# Patient Record
Sex: FEMALE | NOT HISPANIC OR LATINO
[De-identification: names, ages, dates, MRNs, and addresses within clinical notes are randomized per-mention and may not be internally consistent; named-entity substitution may affect disease eponyms.]

---

## 2022-08-08 PROBLEM — Z00.00 ENCOUNTER FOR PREVENTIVE HEALTH EXAMINATION: Status: ACTIVE | Noted: 2022-08-08

## 2022-08-09 ENCOUNTER — NON-APPOINTMENT (OUTPATIENT)
Age: 52
End: 2022-08-09

## 2022-08-10 ENCOUNTER — APPOINTMENT (OUTPATIENT)
Dept: HEMATOLOGY ONCOLOGY | Facility: CLINIC | Age: 52
End: 2022-08-10

## 2022-08-10 ENCOUNTER — APPOINTMENT (OUTPATIENT)
Dept: GYNECOLOGIC ONCOLOGY | Facility: CLINIC | Age: 52
End: 2022-08-10

## 2022-08-10 VITALS
HEART RATE: 91 BPM | SYSTOLIC BLOOD PRESSURE: 136 MMHG | BODY MASS INDEX: 27.88 KG/M2 | DIASTOLIC BLOOD PRESSURE: 82 MMHG | HEIGHT: 62 IN | WEIGHT: 151.5 LBS

## 2022-08-10 DIAGNOSIS — Z80.0 FAMILY HISTORY OF MALIGNANT NEOPLASM OF DIGESTIVE ORGANS: ICD-10-CM

## 2022-08-10 DIAGNOSIS — N88.8 OTHER SPECIFIED NONINFLAMMATORY DISORDERS OF CERVIX UTERI: ICD-10-CM

## 2022-08-10 DIAGNOSIS — Z85.41 PERSONAL HISTORY OF MALIGNANT NEOPLASM OF CERVIX UTERI: ICD-10-CM

## 2022-08-10 DIAGNOSIS — B97.7 PAPILLOMAVIRUS AS THE CAUSE OF DISEASES CLASSIFIED ELSEWHERE: ICD-10-CM

## 2022-08-10 DIAGNOSIS — R59.9 ENLARGED LYMPH NODES, UNSPECIFIED: ICD-10-CM

## 2022-08-10 DIAGNOSIS — R93.89 ABNORMAL FINDINGS ON DIAGNOSTIC IMAGING OF OTHER SPECIFIED BODY STRUCTURES: ICD-10-CM

## 2022-08-10 DIAGNOSIS — R87.613 HIGH GRADE SQUAMOUS INTRAEPITHELIAL LESION ON CYTOLOGIC SMEAR OF CERVIX (HGSIL): ICD-10-CM

## 2022-08-10 PROCEDURE — 99205 OFFICE O/P NEW HI 60 MIN: CPT | Mod: 25

## 2022-08-10 PROCEDURE — 57500 BIOPSY OF CERVIX: CPT

## 2022-08-15 ENCOUNTER — NON-APPOINTMENT (OUTPATIENT)
Age: 52
End: 2022-08-15

## 2022-08-17 ENCOUNTER — NON-APPOINTMENT (OUTPATIENT)
Age: 52
End: 2022-08-17

## 2022-08-18 ENCOUNTER — APPOINTMENT (OUTPATIENT)
Dept: NUCLEAR MEDICINE | Facility: CLINIC | Age: 52
End: 2022-08-18

## 2022-08-18 ENCOUNTER — OUTPATIENT (OUTPATIENT)
Dept: OUTPATIENT SERVICES | Facility: HOSPITAL | Age: 52
LOS: 1 days | End: 2022-08-18
Payer: MEDICAID

## 2022-08-18 DIAGNOSIS — R59.1 GENERALIZED ENLARGED LYMPH NODES: ICD-10-CM

## 2022-08-18 DIAGNOSIS — C53.9 MALIGNANT NEOPLASM OF CERVIX UTERI, UNSPECIFIED: ICD-10-CM

## 2022-08-18 PROCEDURE — 78815 PET IMAGE W/CT SKULL-THIGH: CPT | Mod: 26,PI

## 2022-08-18 PROCEDURE — 78815 PET IMAGE W/CT SKULL-THIGH: CPT

## 2022-08-18 PROCEDURE — A9552: CPT

## 2022-08-19 ENCOUNTER — RESULT REVIEW (OUTPATIENT)
Age: 52
End: 2022-08-19

## 2022-08-19 ENCOUNTER — APPOINTMENT (OUTPATIENT)
Dept: ULTRASOUND IMAGING | Facility: CLINIC | Age: 52
End: 2022-08-19

## 2022-08-19 ENCOUNTER — APPOINTMENT (OUTPATIENT)
Dept: MAMMOGRAPHY | Facility: CLINIC | Age: 52
End: 2022-08-19

## 2022-08-19 ENCOUNTER — OUTPATIENT (OUTPATIENT)
Dept: OUTPATIENT SERVICES | Facility: HOSPITAL | Age: 52
LOS: 1 days | End: 2022-08-19

## 2022-08-19 PROCEDURE — 76641 ULTRASOUND BREAST COMPLETE: CPT | Mod: 26,50

## 2022-08-19 PROCEDURE — 77065 DX MAMMO INCL CAD UNI: CPT | Mod: 26,RT

## 2022-08-19 PROCEDURE — 77061 BREAST TOMOSYNTHESIS UNI: CPT | Mod: 26

## 2022-08-22 ENCOUNTER — OUTPATIENT (OUTPATIENT)
Dept: OUTPATIENT SERVICES | Facility: HOSPITAL | Age: 52
LOS: 1 days | Discharge: HOME | End: 2022-08-22

## 2022-08-22 DIAGNOSIS — C53.1 MALIGNANT NEOPLASM OF EXOCERVIX: ICD-10-CM

## 2022-08-23 ENCOUNTER — APPOINTMENT (OUTPATIENT)
Dept: ULTRASOUND IMAGING | Facility: CLINIC | Age: 52
End: 2022-08-23

## 2022-08-23 ENCOUNTER — OUTPATIENT (OUTPATIENT)
Dept: OUTPATIENT SERVICES | Facility: HOSPITAL | Age: 52
LOS: 1 days | End: 2022-08-23

## 2022-08-23 ENCOUNTER — RESULT REVIEW (OUTPATIENT)
Age: 52
End: 2022-08-23

## 2022-08-23 LAB — CORE LAB BIOPSY: NORMAL

## 2022-08-23 PROCEDURE — 88360 TUMOR IMMUNOHISTOCHEM/MANUAL: CPT | Mod: 26

## 2022-08-23 PROCEDURE — 19083 BX BREAST 1ST LESION US IMAG: CPT | Mod: RT

## 2022-08-23 PROCEDURE — 77065 DX MAMMO INCL CAD UNI: CPT | Mod: 26,RT

## 2022-08-23 PROCEDURE — 88305 TISSUE EXAM BY PATHOLOGIST: CPT | Mod: 26

## 2022-08-24 ENCOUNTER — APPOINTMENT (OUTPATIENT)
Dept: HEMATOLOGY ONCOLOGY | Facility: CLINIC | Age: 52
End: 2022-08-24

## 2022-08-24 ENCOUNTER — NON-APPOINTMENT (OUTPATIENT)
Age: 52
End: 2022-08-24

## 2022-08-24 VITALS
RESPIRATION RATE: 16 BRPM | SYSTOLIC BLOOD PRESSURE: 137 MMHG | BODY MASS INDEX: 27.99 KG/M2 | HEIGHT: 61.97 IN | WEIGHT: 152.12 LBS | DIASTOLIC BLOOD PRESSURE: 83 MMHG | TEMPERATURE: 97.4 F | HEART RATE: 72 BPM | OXYGEN SATURATION: 98 %

## 2022-08-24 LAB — SURGICAL PATHOLOGY STUDY: SIGNIFICANT CHANGE UP

## 2022-08-24 PROCEDURE — 99205 OFFICE O/P NEW HI 60 MIN: CPT

## 2022-08-25 ENCOUNTER — RESULT REVIEW (OUTPATIENT)
Age: 52
End: 2022-08-25

## 2022-08-25 ENCOUNTER — APPOINTMENT (OUTPATIENT)
Dept: SURGICAL ONCOLOGY | Facility: CLINIC | Age: 52
End: 2022-08-25

## 2022-08-25 VITALS
RESPIRATION RATE: 16 BRPM | HEIGHT: 62 IN | BODY MASS INDEX: 27.6 KG/M2 | DIASTOLIC BLOOD PRESSURE: 84 MMHG | WEIGHT: 150 LBS | TEMPERATURE: 98.6 F | HEART RATE: 76 BPM | SYSTOLIC BLOOD PRESSURE: 120 MMHG | OXYGEN SATURATION: 98 %

## 2022-08-25 PROCEDURE — 99205 OFFICE O/P NEW HI 60 MIN: CPT

## 2022-08-25 NOTE — OB HISTORY
[Menstruating] : The patient is menstruating [Menarche Age ____] : menarche age [unfilled] [Definite ___ (Date)] : the last menstrual period was [unfilled] [Normal Duration] : the duration was normal [Regular Cycle Intervals] : have been regular [Total Preg ___] : G[unfilled] [Live Births ___] : P[unfilled]

## 2022-08-25 NOTE — PHYSICAL EXAM
[Normal] : supple, no neck mass and thyroid not enlarged [Normal Neck Lymph Nodes] : normal neck lymph nodes  [Normal] : oriented to person, place and time, with appropriate affect [de-identified] : ecchymosis from biopsy of right breast but no masses; 2cm mobile right axillary lymph node [de-identified] : left supra-clavicular node; right axillary node

## 2022-08-25 NOTE — HISTORY OF PRESENT ILLNESS
[de-identified] : Ms. MARISOL OROSCO is a 52 year old woman, primarily [place] speaking, who presents today for an initial consultation for breast cancer.\par Accompanied by her daughter, Tammy, who the patient prefers to translate for her. \par \par B/L mammo 7/26/22 ([facility]) with benign findings to left breast (BIRADS 2) and right breast shows a suspicious but probably benign lesion (BIRADS 3).\par \par She returned to [place] from the [facility] to establish care after having a biopsy in [facility] that was c/w cervical cancer. She established care with Dr. Radha Holm who repeated a cervical biopsy (path shows high grade squamous intraepithelial lesion to cervix, fragments of SCC w/ extensive tumor necrosis to endocervix) Dr. Holm also ordered PET/CT & further breast imaging \par \par PET/CT 8/18/22 shows large area of intense activity w/in the uterine cavity corresponding to known uterine malignancy, nonspecific focal FDG activity in vaginal region, concerning for disease involvement. FDG avid retroperitoneal & B/L pelvic lymphadenopathy, c/w metastases from uterus. FDG avid right breast nodule likely malignancy. FDG avid right retropectoral, right axillary and right internal mammary nodes, suspicious for metastasis from breast malignancy. Mildly FDG avid prevascular and left retropectoral lymph nodes are indeterminate. Nonspecific approx symmetric FDG avidity in b/l palatine tonsils, probably inflammatory. Nonspecific generalized bone marrow hypermetabolism w/o CT correlate, probably reactive and may be related to anemia.\par \par right breast diagnostic mammo/ bilateral sono to follow-up on [facility] abnormal imaging, done on 8/19/2022. \par US showed suspicious mass to right breast 10:00 4 cm fn, US biopsy recommended, highly suspicious for malignancy, BI-RADS 5.\par \par 8/23/22, right breast US biopsy, 10:00 4 cm fn, path: moderately to poorly differentiated invasive ductal carcinoma with mucinous and micropapillary features, 1.4 cm, intermediate grade DCIS, cribriform pattern, extensive lymphovascular invasion present, microcalcifications present in malignant and benign epithelium. ER/HI/HER2 receptors pending \par \par \par Family history of father with stomach cancer. Today she denies palpable breast masses, nipple discharge, skin changes, inversion or breast pain. \par

## 2022-08-25 NOTE — CONSULT LETTER
[Dear  ___] : Dear  [unfilled], [Consult Letter:] : I had the pleasure of evaluating your patient, [unfilled]. [Please see my note below.] : Please see my note below. [Consult Closing:] : Thank you very much for allowing me to participate in the care of this patient.  If you have any questions, please do not hesitate to contact me. [Sincerely,] : Sincerely, [FreeTextEntry1] : We can speak when the pathology returns from the lymph node biopsies. [FreeTextEntry3] : Panda Acosta MD FACS\par Chief of Surgical Oncology\par \par  [DrYesenia  ___] : Dr. PINEDO

## 2022-08-25 NOTE — ASSESSMENT
[FreeTextEntry1] : IMP:\par 51yo F w/ newly diagnosed uterine & breast cancer. \par \par B/L mammo 7/26/22 ([place]) with benign findings to left breast (BIRADS 2) and right breast shows a suspicious but probably benign lesion (BIRADS 3).\par \par 8/10/22: cervical biopsy (path shows high grade squamous intraepithelial lesion to cervix, fragments of SCC w/ extensive tumor necrosis to endocervix)\par \par PET/CT 8/18/22 shows large area of intense activity w/in the uterine cavity corresponding to known uterine malignancy, nonspecific focal FDG activity in vaginal region, concerning for disease involvement. FDG avid retroperitoneal & B/L pelvic lymphadenopathy, c/w metastases from uterus. FDG avid right breast nodule likely malignancy. FDG avid right retropectoral, right axillary and right internal mammary nodes, suspicious for metastasis from breast malignancy. Mildly FDG avid prevascular and left retropectoral lymph nodes are indeterminate. Nonspecific approx symmetric FDG avidity in b/l palatine tonsils, probably inflammatory. Nonspecific generalized bone marrow hypermetabolism w/o CT correlate, probably reactive and may be related to anemia.\par \par right breast diagnostic mammo/ bilateral sono 8/19/2022. 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 ER/RI/HER2 receptors pending \par \par MRI ordered & currently pending appt. She is seeing Dr. Charles for the cervical ca and will see Dr. Perrin next week for the breast Ca.\par \par PLAN:\par Right axilla US biopsy  and left supra-clavicular node biopsy tomorrow\par \par When that pathology returns,we will need to discus the priorities for treatment as a team

## 2022-08-26 ENCOUNTER — RESULT REVIEW (OUTPATIENT)
Age: 52
End: 2022-08-26

## 2022-08-26 ENCOUNTER — OUTPATIENT (OUTPATIENT)
Dept: OUTPATIENT SERVICES | Facility: HOSPITAL | Age: 52
LOS: 1 days | End: 2022-08-26
Payer: COMMERCIAL

## 2022-08-26 ENCOUNTER — NON-APPOINTMENT (OUTPATIENT)
Age: 52
End: 2022-08-26

## 2022-08-26 ENCOUNTER — APPOINTMENT (OUTPATIENT)
Dept: ULTRASOUND IMAGING | Facility: IMAGING CENTER | Age: 52
End: 2022-08-26

## 2022-08-26 DIAGNOSIS — C50.911 MALIGNANT NEOPLASM OF UNSPECIFIED SITE OF RIGHT FEMALE BREAST: ICD-10-CM

## 2022-08-26 PROCEDURE — 88172 CYTP DX EVAL FNA 1ST EA SITE: CPT

## 2022-08-26 PROCEDURE — 88341 IMHCHEM/IMCYTCHM EA ADD ANTB: CPT

## 2022-08-26 PROCEDURE — 88173 CYTOPATH EVAL FNA REPORT: CPT | Mod: 26

## 2022-08-26 PROCEDURE — 88305 TISSUE EXAM BY PATHOLOGIST: CPT

## 2022-08-26 PROCEDURE — 10005 FNA BX W/US GDN 1ST LES: CPT | Mod: RT,59

## 2022-08-26 PROCEDURE — 88341 IMHCHEM/IMCYTCHM EA ADD ANTB: CPT | Mod: 26,59

## 2022-08-26 PROCEDURE — 10006 FNA BX W/US GDN EA ADDL: CPT | Mod: LT

## 2022-08-26 PROCEDURE — 76942 ECHO GUIDE FOR BIOPSY: CPT | Mod: 26

## 2022-08-26 PROCEDURE — 76942 ECHO GUIDE FOR BIOPSY: CPT

## 2022-08-26 PROCEDURE — 10006 FNA BX W/US GDN EA ADDL: CPT

## 2022-08-26 PROCEDURE — 88305 TISSUE EXAM BY PATHOLOGIST: CPT | Mod: 26

## 2022-08-26 PROCEDURE — 88360 TUMOR IMMUNOHISTOCHEM/MANUAL: CPT | Mod: 26

## 2022-08-26 PROCEDURE — 88377 M/PHMTRC ALYS ISHQUANT/SEMIQ: CPT

## 2022-08-26 PROCEDURE — 88342 IMHCHEM/IMCYTCHM 1ST ANTB: CPT | Mod: 26,59

## 2022-08-26 PROCEDURE — 38505 NEEDLE BIOPSY LYMPH NODES: CPT

## 2022-08-26 PROCEDURE — 88360 TUMOR IMMUNOHISTOCHEM/MANUAL: CPT

## 2022-08-26 PROCEDURE — 88377 M/PHMTRC ALYS ISHQUANT/SEMIQ: CPT | Mod: 26

## 2022-08-26 PROCEDURE — 38505 NEEDLE BIOPSY LYMPH NODES: CPT | Mod: 50

## 2022-08-26 PROCEDURE — 88173 CYTOPATH EVAL FNA REPORT: CPT

## 2022-08-26 NOTE — CONSULT LETTER
[Dear  ___] : Dear  [unfilled], [Consult Letter:] : I had the pleasure of evaluating your patient, [unfilled]. [DrYesenia  ___] : Dr. PINEDO

## 2022-08-26 NOTE — REASON FOR VISIT
[Initial Consultation] : an initial consultation [Other: _____] : [unfilled] [FreeTextEntry2] : cervical cancer, breast cancer.

## 2022-08-26 NOTE — HISTORY OF PRESENT ILLNESS
[Disease: _____________________] : Disease: [unfilled] [de-identified] : 52 y.o female with newly diagnosed with cervical cancer in [facility].\par \par \par \par Ms. Sharma, 52 years old, accompanied by her daughter, Tammy and is referred by Dr. Charles, for HSIL (7/20/22 from [facility]) cervical mass, thickened endometrium noted on CT (from [facility]). 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 \par \par Pt is without symptoms; she has not seen a GYN in over 10 years and recalls never having an abnormal pap smear. She returned home to the [facility] to help her mother with medical problems and decided to have a GYN checkup at that time. Pap smear returned HSIL / +HPV. Pathology noted SCC moderately differentiated and CT scan identified cervical mass; thickened lining of the uterus; lymphadenopathy as well as a right breast mass and right axillary lymphadenopathy. \par \par She denies f/c/n/v/d/abnormal vaginal bleeding, changes to bowel or bladder habits. Denies unintentional weight loss or gain. Denies post-coital or intercycle vaginal bleeding; denies abdominal pain, distention, bloating, back pain or any other associated symptoms. \par \par Imaging:\par 7/22/20 ([facility], [facility])\par Breast: 1.6 x 1.7 cm tuberous, polycyclic contour, accumulating contrast medium, visualized at the borderline of the lower quadrants of the right breast, with no infiltration of the thoracic muscles. \par Right axillary region: 1.4 x 1.0 cm; 1.0 x 1.0 cm and 0.8 x 1.1 cm. lymphadenopathy. \par Liver: well defined even borders, the liver parenchyma is with signs of fat rearrangement, a hypdense inclusion 1.6 x 2.1 cm in size, is subcapsularly visualized Segment 8 of the liver. It lacunarly accumulates contrast medium, additionally accumulates contrast medium during the delayed phase. The intrahepatic and extrahepatic bile ducts are not dilated. \par Abdominal lymph nodes: paraaortic and interaortocaval lymph nodes are sized 10-12 mm, weakly accumulate contrast medium. \par No free fluid in the abdominal cavity. \par Uterus: 5.9 x 7.5 cm. \par EM: 15 mm with dense fluid visualized, mildly heterogeneous. \par Cervix: tissue lesion 56 x 42 mm invading the uterine isthmus and body. The parametria are infiltrated. \par Uterine appendages are normally positioned, a cystic inclusion of 3.3 x 3.5 cm is visualized in the projection of the left ovary. \par Right Ovary: 2.0 x 2.8 cm. \par Lymph Nodes:\par - right common iliac lymph node: 1.1 x 1.5 cm.\par - right pelvic wall node: 1.8 x 3.1 cm\par - left pelvic wall node: 2.4 x 3.0 cm. \par - right external iliac lymph node: 1.8 x 1.6 cm \par \par She was seen by Dr. 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 \par Menarche: 12\par Menopause: still menstruating. LMP 7/30/22 [de-identified] : Patient has no symptoms.\par

## 2022-08-31 ENCOUNTER — APPOINTMENT (OUTPATIENT)
Dept: SURGICAL ONCOLOGY | Facility: CLINIC | Age: 52
End: 2022-08-31

## 2022-08-31 PROBLEM — Z87.898 HISTORY OF LUMP OF RIGHT BREAST: Status: RESOLVED | Noted: 2022-08-22 | Resolved: 2022-08-31

## 2022-08-31 PROBLEM — Z85.3 HISTORY OF INFILTRATING DUCTAL CARCINOMA OF BREAST: Status: RESOLVED | Noted: 2022-08-25 | Resolved: 2022-08-31

## 2022-08-31 PROBLEM — Z76.89 ESTABLISHING CARE WITH NEW DOCTOR, ENCOUNTER FOR: Status: RESOLVED | Noted: 2022-08-10 | Resolved: 2022-08-31

## 2022-08-31 LAB — NON-GYNECOLOGICAL CYTOLOGY STUDY: SIGNIFICANT CHANGE UP

## 2022-09-01 ENCOUNTER — RESULT REVIEW (OUTPATIENT)
Age: 52
End: 2022-09-01

## 2022-09-01 ENCOUNTER — APPOINTMENT (OUTPATIENT)
Dept: HEMATOLOGY ONCOLOGY | Facility: CLINIC | Age: 52
End: 2022-09-01

## 2022-09-01 VITALS
SYSTOLIC BLOOD PRESSURE: 132 MMHG | HEIGHT: 61.42 IN | RESPIRATION RATE: 16 BRPM | HEART RATE: 78 BPM | WEIGHT: 152.12 LBS | BODY MASS INDEX: 28.35 KG/M2 | TEMPERATURE: 96.6 F | OXYGEN SATURATION: 94 % | DIASTOLIC BLOOD PRESSURE: 83 MMHG

## 2022-09-01 DIAGNOSIS — Z76.89 PERSONS ENCOUNTERING HEALTH SERVICES IN OTHER SPECIFIED CIRCUMSTANCES: ICD-10-CM

## 2022-09-01 DIAGNOSIS — Z85.3 PERSONAL HISTORY OF MALIGNANT NEOPLASM OF BREAST: ICD-10-CM

## 2022-09-01 DIAGNOSIS — Z87.898 PERSONAL HISTORY OF OTHER SPECIFIED CONDITIONS: ICD-10-CM

## 2022-09-01 LAB
BASOPHILS # BLD AUTO: 0.05 K/UL — SIGNIFICANT CHANGE UP (ref 0–0.2)
BASOPHILS NFR BLD AUTO: 0.7 % — SIGNIFICANT CHANGE UP (ref 0–2)
EOSINOPHIL # BLD AUTO: 0.06 K/UL — SIGNIFICANT CHANGE UP (ref 0–0.5)
EOSINOPHIL NFR BLD AUTO: 0.9 % — SIGNIFICANT CHANGE UP (ref 0–6)
HCT VFR BLD CALC: 36.6 % — SIGNIFICANT CHANGE UP (ref 34.5–45)
HGB BLD-MCNC: 12.1 G/DL — SIGNIFICANT CHANGE UP (ref 11.5–15.5)
IMM GRANULOCYTES NFR BLD AUTO: 0.1 % — SIGNIFICANT CHANGE UP (ref 0–1.5)
INR PPP: 1.03 RATIO
LYMPHOCYTES # BLD AUTO: 1.08 K/UL — SIGNIFICANT CHANGE UP (ref 1–3.3)
LYMPHOCYTES # BLD AUTO: 15.9 % — SIGNIFICANT CHANGE UP (ref 13–44)
MCHC RBC-ENTMCNC: 28.1 PG — SIGNIFICANT CHANGE UP (ref 27–34)
MCHC RBC-ENTMCNC: 33.1 G/DL — SIGNIFICANT CHANGE UP (ref 32–36)
MCV RBC AUTO: 85.1 FL — SIGNIFICANT CHANGE UP (ref 80–100)
MONOCYTES # BLD AUTO: 0.58 K/UL — SIGNIFICANT CHANGE UP (ref 0–0.9)
MONOCYTES NFR BLD AUTO: 8.5 % — SIGNIFICANT CHANGE UP (ref 2–14)
NEUTROPHILS # BLD AUTO: 5.01 K/UL — SIGNIFICANT CHANGE UP (ref 1.8–7.4)
NEUTROPHILS NFR BLD AUTO: 73.9 % — SIGNIFICANT CHANGE UP (ref 43–77)
NRBC # BLD: 0 /100 WBCS — SIGNIFICANT CHANGE UP (ref 0–0)
PLATELET # BLD AUTO: 239 K/UL — SIGNIFICANT CHANGE UP (ref 150–400)
PT BLD: 12.1 SEC
RBC # BLD: 4.3 M/UL — SIGNIFICANT CHANGE UP (ref 3.8–5.2)
RBC # FLD: 12.7 % — SIGNIFICANT CHANGE UP (ref 10.3–14.5)
TRANSFERRIN SERPL-MCNC: 293 MG/DL
WBC # BLD: 6.79 K/UL — SIGNIFICANT CHANGE UP (ref 3.8–10.5)
WBC # FLD AUTO: 6.79 K/UL — SIGNIFICANT CHANGE UP (ref 3.8–10.5)

## 2022-09-01 PROCEDURE — 99215 OFFICE O/P EST HI 40 MIN: CPT

## 2022-09-01 PROCEDURE — 93010 ELECTROCARDIOGRAM REPORT: CPT

## 2022-09-02 LAB
ALBUMIN SERPL ELPH-MCNC: 4.4 G/DL
ALP BLD-CCNC: 61 U/L
ALT SERPL-CCNC: 11 U/L
ANION GAP SERPL CALC-SCNC: 18 MMOL/L
AST SERPL-CCNC: 24 U/L
BILIRUB SERPL-MCNC: 0.2 MG/DL
BUN SERPL-MCNC: 10 MG/DL
CALCIUM SERPL-MCNC: 9.7 MG/DL
CANCER AG125 SERPL-ACNC: 35 U/ML
CANCER AG27-29 SERPL-ACNC: 122.4 U/ML
CEA SERPL-MCNC: 1.6 NG/ML
CHLORIDE SERPL-SCNC: 99 MMOL/L
CO2 SERPL-SCNC: 20 MMOL/L
CREAT SERPL-MCNC: 0.81 MG/DL
EGFR: 87 ML/MIN/1.73M2
ESTIMATED AVERAGE GLUCOSE: 120 MG/DL
FERRITIN SERPL-MCNC: 17 NG/ML
GLUCOSE SERPL-MCNC: 86 MG/DL
HBA1C MFR BLD HPLC: 5.8 %
IRON SATN MFR SERPL: 10 %
IRON SERPL-MCNC: 38 UG/DL
POTASSIUM SERPL-SCNC: 3.9 MMOL/L
PROT SERPL-MCNC: 7.8 G/DL
SODIUM SERPL-SCNC: 136 MMOL/L
TIBC SERPL-MCNC: 386 UG/DL
TSH SERPL-ACNC: 1.55 UIU/ML
UIBC SERPL-MCNC: 348 UG/DL

## 2022-09-08 ENCOUNTER — APPOINTMENT (OUTPATIENT)
Dept: HEMATOLOGY ONCOLOGY | Facility: CLINIC | Age: 52
End: 2022-09-08

## 2022-09-08 PROCEDURE — 99214 OFFICE O/P EST MOD 30 MIN: CPT | Mod: 95

## 2022-09-09 ENCOUNTER — LABORATORY RESULT (OUTPATIENT)
Age: 52
End: 2022-09-09

## 2022-09-09 ENCOUNTER — RESULT REVIEW (OUTPATIENT)
Age: 52
End: 2022-09-09

## 2022-09-09 ENCOUNTER — APPOINTMENT (OUTPATIENT)
Dept: CARDIOLOGY | Facility: CLINIC | Age: 52
End: 2022-09-09

## 2022-09-09 ENCOUNTER — APPOINTMENT (OUTPATIENT)
Dept: HEMATOLOGY ONCOLOGY | Facility: CLINIC | Age: 52
End: 2022-09-09

## 2022-09-09 LAB
BASOPHILS # BLD AUTO: 0.05 K/UL — SIGNIFICANT CHANGE UP (ref 0–0.2)
BASOPHILS NFR BLD AUTO: 1 % — SIGNIFICANT CHANGE UP (ref 0–2)
EOSINOPHIL # BLD AUTO: 0.07 K/UL — SIGNIFICANT CHANGE UP (ref 0–0.5)
EOSINOPHIL NFR BLD AUTO: 1.4 % — SIGNIFICANT CHANGE UP (ref 0–6)
HCT VFR BLD CALC: 38.8 % — SIGNIFICANT CHANGE UP (ref 34.5–45)
HGB BLD-MCNC: 12.8 G/DL — SIGNIFICANT CHANGE UP (ref 11.5–15.5)
IMM GRANULOCYTES NFR BLD AUTO: 0.4 % — SIGNIFICANT CHANGE UP (ref 0–1.5)
LYMPHOCYTES # BLD AUTO: 1.26 K/UL — SIGNIFICANT CHANGE UP (ref 1–3.3)
LYMPHOCYTES # BLD AUTO: 25.1 % — SIGNIFICANT CHANGE UP (ref 13–44)
MCHC RBC-ENTMCNC: 28 PG — SIGNIFICANT CHANGE UP (ref 27–34)
MCHC RBC-ENTMCNC: 33 G/DL — SIGNIFICANT CHANGE UP (ref 32–36)
MCV RBC AUTO: 84.9 FL — SIGNIFICANT CHANGE UP (ref 80–100)
MONOCYTES # BLD AUTO: 0.53 K/UL — SIGNIFICANT CHANGE UP (ref 0–0.9)
MONOCYTES NFR BLD AUTO: 10.6 % — SIGNIFICANT CHANGE UP (ref 2–14)
NEUTROPHILS # BLD AUTO: 3.09 K/UL — SIGNIFICANT CHANGE UP (ref 1.8–7.4)
NEUTROPHILS NFR BLD AUTO: 61.5 % — SIGNIFICANT CHANGE UP (ref 43–77)
NRBC # BLD: 0 /100 WBCS — SIGNIFICANT CHANGE UP (ref 0–0)
PLATELET # BLD AUTO: 272 K/UL — SIGNIFICANT CHANGE UP (ref 150–400)
RBC # BLD: 4.57 M/UL — SIGNIFICANT CHANGE UP (ref 3.8–5.2)
RBC # FLD: 12.5 % — SIGNIFICANT CHANGE UP (ref 10.3–14.5)
SARS-COV-2 N GENE NPH QL NAA+PROBE: NOT DETECTED
WBC # BLD: 5.02 K/UL — SIGNIFICANT CHANGE UP (ref 3.8–10.5)
WBC # FLD AUTO: 5.02 K/UL — SIGNIFICANT CHANGE UP (ref 3.8–10.5)

## 2022-09-09 PROCEDURE — 93306 TTE W/DOPPLER COMPLETE: CPT

## 2022-09-09 PROCEDURE — 93356 MYOCRD STRAIN IMG SPCKL TRCK: CPT

## 2022-09-09 PROCEDURE — 93010 ELECTROCARDIOGRAM REPORT: CPT

## 2022-09-10 LAB
ALBUMIN SERPL ELPH-MCNC: 4.7 G/DL
ALP BLD-CCNC: 65 U/L
ALT SERPL-CCNC: 13 U/L
ANION GAP SERPL CALC-SCNC: 12 MMOL/L
APPEARANCE: CLEAR
APTT BLD: 30.5 SEC
AST SERPL-CCNC: 23 U/L
BILIRUB SERPL-MCNC: 0.3 MG/DL
BILIRUBIN URINE: NEGATIVE
BLOOD URINE: ABNORMAL
BUN SERPL-MCNC: 10 MG/DL
CALCIUM SERPL-MCNC: 9.9 MG/DL
CHLORIDE SERPL-SCNC: 101 MMOL/L
CO2 SERPL-SCNC: 23 MMOL/L
COLOR: COLORLESS
CREAT SERPL-MCNC: 0.82 MG/DL
EGFR: 86 ML/MIN/1.73M2
GLUCOSE QUALITATIVE U: NEGATIVE
GLUCOSE SERPL-MCNC: 94 MG/DL
HAV IGM SER QL: NONREACTIVE
HBV CORE IGM SER QL: NONREACTIVE
HBV SURFACE AG SER QL: NONREACTIVE
HCV AB SER QL: NONREACTIVE
HCV S/CO RATIO: 0.51 S/CO
INR PPP: 0.99 RATIO
KETONES URINE: NEGATIVE
LEUKOCYTE ESTERASE URINE: ABNORMAL
MAGNESIUM SERPL-MCNC: 2.2 MG/DL
NITRITE URINE: NEGATIVE
PH URINE: 7
POTASSIUM SERPL-SCNC: 4.9 MMOL/L
PROT SERPL-MCNC: 8.3 G/DL
PROTEIN URINE: NEGATIVE
PT BLD: 11.7 SEC
SODIUM SERPL-SCNC: 136 MMOL/L
SPECIFIC GRAVITY URINE: 1.01
TSH SERPL-ACNC: 1.85 UIU/ML
UROBILINOGEN URINE: NORMAL

## 2022-09-10 NOTE — HISTORY OF PRESENT ILLNESS
[Home] : at home, [unfilled] , at the time of the visit. [Medical Office: ([facility])___] : at the medical office located in  [Other:____] : [unfilled] [Verbal consent obtained from patient] : the patient, [unfilled] [Disease: _____________________] : Disease: [unfilled] [de-identified] : 52 y.o female with newly diagnosed with cervical cancer in [facility].\par \par \par \par Ms. Sharma, 52 years old, accompanied by her daughter, Tammy and is referred by Dr. Charles, for HSIL (7/20/22 from [facility]) cervical mass, thickened endometrium noted on CT (from [facility]). 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 \par \par Pt is without symptoms; she has not seen a GYN in over 10 years and recalls never having an abnormal pap smear. She returned home to the [facility] to help her mother with medical problems and decided to have a GYN checkup at that time. Pap smear returned HSIL / +HPV. Pathology noted SCC moderately differentiated and CT scan identified cervical mass; thickened lining of the uterus; lymphadenopathy as well as a right breast mass and right axillary lymphadenopathy. \par \par She denies f/c/n/v/d/abnormal vaginal bleeding, changes to bowel or bladder habits. Denies unintentional weight loss or gain. Denies post-coital or intercycle vaginal bleeding; denies abdominal pain, distention, bloating, back pain or any other associated symptoms. \par \par Imaging:\par 7/22/20 ([facility], [facility])\par Breast: 1.6 x 1.7 cm tuberous, polycyclic contour, accumulating contrast medium, visualized at the borderline of the lower quadrants of the right breast, with no infiltration of the thoracic muscles. \par Right axillary region: 1.4 x 1.0 cm; 1.0 x 1.0 cm and 0.8 x 1.1 cm. lymphadenopathy. \par Liver: well defined even borders, the liver parenchyma is with signs of fat rearrangement, a hypdense inclusion 1.6 x 2.1 cm in size, is subcapsularly visualized Segment 8 of the liver. It lacunarly accumulates contrast medium, additionally accumulates contrast medium during the delayed phase. The intrahepatic and extrahepatic bile ducts are not dilated. \par Abdominal lymph nodes: paraaortic and interaortocaval lymph nodes are sized 10-12 mm, weakly accumulate contrast medium. \par No free fluid in the abdominal cavity. \par Uterus: 5.9 x 7.5 cm. \par EM: 15 mm with dense fluid visualized, mildly heterogeneous. \par Cervix: tissue lesion 56 x 42 mm invading the uterine isthmus and body. The parametria are infiltrated. \par Uterine appendages are normally positioned, a cystic inclusion of 3.3 x 3.5 cm is visualized in the projection of the left ovary. \par Right Ovary: 2.0 x 2.8 cm. \par Lymph Nodes:\par - right common iliac lymph node: 1.1 x 1.5 cm.\par - right pelvic wall node: 1.8 x 3.1 cm\par - left pelvic wall node: 2.4 x 3.0 cm. \par - right external iliac lymph node: 1.8 x 1.6 cm \par \par She was seen by Dr. 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 \par Menarche: 12\par Menopause: still menstruating. LMP 7/30/22 [de-identified] : Patient here for a scheduled TEB visit. PET scan showed metastatic disease.\par Breast biopsy consistent with infiltrating ductal carcinoma , ER/CT positive, Fms4tlm negative.\par Left supraclavicular LN consistent with squamous cell of the cervix.

## 2022-09-10 NOTE — REASON FOR VISIT
[Follow-Up Visit] : a follow-up [Other: _____] : [unfilled] [FreeTextEntry2] : metastatic cervical cancer, stage III breast cancer.

## 2022-09-11 LAB — SURGICAL PATHOLOGY STUDY: SIGNIFICANT CHANGE UP

## 2022-09-12 ENCOUNTER — TRANSCRIPTION ENCOUNTER (OUTPATIENT)
Age: 52
End: 2022-09-12

## 2022-09-12 ENCOUNTER — RESULT REVIEW (OUTPATIENT)
Age: 52
End: 2022-09-12

## 2022-09-12 ENCOUNTER — OUTPATIENT (OUTPATIENT)
Dept: OUTPATIENT SERVICES | Facility: HOSPITAL | Age: 52
LOS: 1 days | End: 2022-09-12
Payer: MEDICAID

## 2022-09-12 VITALS
OXYGEN SATURATION: 100 % | HEIGHT: 62 IN | WEIGHT: 154.32 LBS | HEART RATE: 78 BPM | DIASTOLIC BLOOD PRESSURE: 71 MMHG | SYSTOLIC BLOOD PRESSURE: 134 MMHG | RESPIRATION RATE: 16 BRPM | TEMPERATURE: 98 F

## 2022-09-12 VITALS
DIASTOLIC BLOOD PRESSURE: 68 MMHG | RESPIRATION RATE: 16 BRPM | OXYGEN SATURATION: 100 % | HEART RATE: 71 BPM | SYSTOLIC BLOOD PRESSURE: 128 MMHG

## 2022-09-12 DIAGNOSIS — C50.911 MALIGNANT NEOPLASM OF UNSPECIFIED SITE OF RIGHT FEMALE BREAST: ICD-10-CM

## 2022-09-12 PROCEDURE — C1887: CPT

## 2022-09-12 PROCEDURE — C1769: CPT

## 2022-09-12 PROCEDURE — 36561 INSERT TUNNELED CV CATH: CPT

## 2022-09-12 PROCEDURE — 76937 US GUIDE VASCULAR ACCESS: CPT | Mod: 26

## 2022-09-12 PROCEDURE — 77001 FLUOROGUIDE FOR VEIN DEVICE: CPT

## 2022-09-12 PROCEDURE — 77001 FLUOROGUIDE FOR VEIN DEVICE: CPT | Mod: 26

## 2022-09-12 PROCEDURE — 76937 US GUIDE VASCULAR ACCESS: CPT

## 2022-09-12 PROCEDURE — C1894: CPT

## 2022-09-12 NOTE — REASON FOR VISIT
[Initial Consultation] : an initial consultation [Other: _____] : [unfilled] [FreeTextEntry2] : Cervical cancer and Breast cancer.

## 2022-09-12 NOTE — PRE PROCEDURE NOTE - PRE PROCEDURE EVALUATION
Interventional Radiology    HPI: 52y Female with cervical & breast cancer requiring venous access for chemotherapy presents to IR for chest port placement- due to start chemo on Wednesday, 9/14.    Allergies: NDKA    Data:    T(C): --  HR: --  BP: --  RR: --  SpO2: --    Exam  General: No acute distress  Chest: Non labored breathing  Abdomen: Non-distended  Extremities: No swelling, warm    -WBC 5.02 / HgB 12.8 / Hct 38.8 / Plt 272      Plan: 52y Female presents for chest port placement  -Risks/Benefits/alternatives explained with the patient and/or healthcare proxy and witnessed informed consent obtained.

## 2022-09-12 NOTE — HISTORY OF PRESENT ILLNESS
[Disease: _____________________] : Disease: [unfilled] [T: ___] : T[unfilled] [M: ___] : M[unfilled] [N: ___] : N[unfilled] [AJCC Stage: ____] : AJCC Stage: [unfilled] [de-identified] : Cervical cancer diagnosed at age 52 in 2022 while in [facility]\par Right breast cancer diagnosed simultaneously at age 52\par 7/2022 Pap smear revealed HSIL and +HPV.  Pathology noted SCCA, moderately differentiated\par 7/22/22 CT scan in [facility] identified cervical mass, 5.6 x 4.2 cm, invading the uterine isthmus and body. The parametria are infiltrated. Thickened uterine lining. Lymphadenopathy with right common iliac lymph node: 1.1 x 1.5 cm, right pelvic wall node: 1.8 x 3.1 cm, left pelvic wall node: 2.4 x 3.0 cm, right external iliac lymph node: 1.8 x 1.6 cm \par CT scan also reported a 1.6 x 1.7 cm tuberous mass at the borderline of the lower quadrant of the right breast, with no infiltration of the thoracic muscles.  Right axillary lymphadenopathy measuring 1.4 x 1.0 cm, 1.0 x 1.0 cm and 0.8 x 1.1 cm. \par 8/10/22 Cervical biopsy revealed high grade squamous intraepithelial lesion (HGSIL) (CIN3) with PD-L1 Tumor Proportion Score (TPS) 30%, positive\par 8/18/22 PET/CT scan showed a large area of intense activity within the uterine cavity extending into the uterine cervix with SUV 19.5. FDG avid retroperitoneal and bilateral pelvic lymphadenopathy (SUV 7.4 - 21.1). FDG avid right breast nodule (SUV 13.9) and right retropectoral (SUV 6.1), axillary (SUV 18.2) and internal mammary lymph nodes (SUV 5.8) suspicious for metastasis from breast. Mildly FDG avid prevascular and left retropectoral LN are indeterminate. Nonspecific FDG avidity in bilateral palatine tonsils, probably inflammatory. Nonspecific generalized BM hypermetabolism without CT correlate, probably reactive and may be related to anemia.\par 8/19/22 Mammogram and sonogram revealed heterogeneously dense breast parenchyma and an irregularly marginated mass in the upper outer quadrant  of the right breast with adjacent clusters of indeterminate calcifications. Sonogram revealed a 1.0 x 1.2 cm mass at 10:00. BI-RADS 5\par 8/23/22 Right breast biopsy revealed moderately to poorly differentiated IDC with extensive lymphovascular invasion, ER 95%, VT 95%, HER-2 2+ FISH negative\par 8/26/22 Right axillary LN revealed metastatic adenocarcinoma c/w breast origin. \par 8/26/22 Left supraclavicular LN positive for metastatic squamous cell carcinoma consistent with Stage IV cervical cancer\par  [de-identified] : Right breast IDC with extensive lymphovascular invasion, axillary LN positive, ER 95%, NE 95%, HER-2 2+ FISH negative [de-identified] : Shari comes with her daughter Tammy to discuss the medical management of her newly diagnosed breast and cervical cancers.\par She had not recently had a mammogram or Gyn exam until she was visiting her mother in [facility] and while there followed up with her doctors. She was found to have a large cervical cancer and scan showed synchronous right breast cancer. She has been asymptomatic with no vaginal bleeding and regular periods\par \par HEALTH MAINTENANCE:\par PCP: Dr. Heather Kumari\par Mammogram and breast ultrasound: 8/19/22\par Pap Smear: 7/22\par Colonoscopy: none\par

## 2022-09-12 NOTE — PHYSICAL EXAM
[Fully active, able to carry on all pre-disease performance without restriction] : Status 0 - Fully active, able to carry on all pre-disease performance without restriction [Normal] : affect appropriate [de-identified] : Right breast ecchymosis from biopsy but no palpable mass; right axillary palpable 2 cm LN, mobile

## 2022-09-12 NOTE — ASU DISCHARGE PLAN (ADULT/PEDIATRIC) - NURSING INSTRUCTIONS
Please feel free to contact us at [phone] if any problems arise. After 6PM, Monday through Friday, on weekends and on holidays, please call [phone] and ask for the radiology resident on call to be paged.

## 2022-09-12 NOTE — CONSULT LETTER
[Consult Letter:] : I had the pleasure of evaluating your patient, [unfilled]. [Dear  ___] : Dear  [unfilled], [Please see my note below.] : Please see my note below. [Consult Closing:] : Thank you very much for allowing me to participate in the care of this patient.  If you have any questions, please do not hesitate to contact me. [Sincerely,] : Sincerely, [DrYesenia  ___] : Dr. PINEDO [FreeTextEntry2] : Panda Acosta MD\par [address]\[place] [address]\par  [FreeTextEntry3] : Kaye Perrin MD\par Associate Chief \par [facility]\par [facility]\par  of Medicine\par [employer]\par \par

## 2022-09-12 NOTE — ASU PATIENT PROFILE, ADULT - FALL HARM RISK - UNIVERSAL INTERVENTIONS
Bed in lowest position, wheels locked, appropriate side rails in place/Call bell, personal items and telephone in reach/Instruct patient to call for assistance before getting out of bed or chair/Non-slip footwear when patient is out of bed/[place] to call system/Physically safe environment - no spills, clutter or unnecessary equipment/Purposeful Proactive Rounding/Room/bathroom lighting operational, light cord in reach

## 2022-09-14 ENCOUNTER — RESULT REVIEW (OUTPATIENT)
Age: 52
End: 2022-09-14

## 2022-09-14 ENCOUNTER — NON-APPOINTMENT (OUTPATIENT)
Age: 52
End: 2022-09-14

## 2022-09-14 ENCOUNTER — APPOINTMENT (OUTPATIENT)
Dept: INFUSION THERAPY | Facility: HOSPITAL | Age: 52
End: 2022-09-14

## 2022-09-14 DIAGNOSIS — R11.2 NAUSEA WITH VOMITING, UNSPECIFIED: ICD-10-CM

## 2022-09-14 DIAGNOSIS — Z51.11 ENCOUNTER FOR ANTINEOPLASTIC CHEMOTHERAPY: ICD-10-CM

## 2022-09-14 DIAGNOSIS — E86.0 DEHYDRATION: ICD-10-CM

## 2022-09-14 LAB
APPEARANCE UR: CLEAR — SIGNIFICANT CHANGE UP
BACTERIA # UR AUTO: NEGATIVE — SIGNIFICANT CHANGE UP
BASOPHILS # BLD AUTO: 0 K/UL — SIGNIFICANT CHANGE UP (ref 0–0.2)
BASOPHILS NFR BLD AUTO: 0 % — SIGNIFICANT CHANGE UP (ref 0–2)
BILIRUB UR-MCNC: NEGATIVE — SIGNIFICANT CHANGE UP
COLOR SPEC: COLORLESS — SIGNIFICANT CHANGE UP
DIFF PNL FLD: ABNORMAL
ELLIPTOCYTES BLD QL SMEAR: SLIGHT — SIGNIFICANT CHANGE UP
EOSINOPHIL # BLD AUTO: 0 K/UL — SIGNIFICANT CHANGE UP (ref 0–0.5)
EOSINOPHIL NFR BLD AUTO: 0 % — SIGNIFICANT CHANGE UP (ref 0–6)
EPI CELLS # UR: 3 /HPF — SIGNIFICANT CHANGE UP (ref 0–5)
GLUCOSE UR QL: ABNORMAL
HCT VFR BLD CALC: 38.3 % — SIGNIFICANT CHANGE UP (ref 34.5–45)
HGB BLD-MCNC: 12.8 G/DL — SIGNIFICANT CHANGE UP (ref 11.5–15.5)
HYALINE CASTS # UR AUTO: 2 /LPF — SIGNIFICANT CHANGE UP (ref 0–7)
KETONES UR-MCNC: NEGATIVE — SIGNIFICANT CHANGE UP
LEUKOCYTE ESTERASE UR-ACNC: ABNORMAL
LYMPHOCYTES # BLD AUTO: 0.61 K/UL — LOW (ref 1–3.3)
LYMPHOCYTES # BLD AUTO: 11 % — LOW (ref 13–44)
MCHC RBC-ENTMCNC: 28.1 PG — SIGNIFICANT CHANGE UP (ref 27–34)
MCHC RBC-ENTMCNC: 33.4 G/DL — SIGNIFICANT CHANGE UP (ref 32–36)
MCV RBC AUTO: 84 FL — SIGNIFICANT CHANGE UP (ref 80–100)
MONOCYTES # BLD AUTO: 0.11 K/UL — SIGNIFICANT CHANGE UP (ref 0–0.9)
MONOCYTES NFR BLD AUTO: 2 % — SIGNIFICANT CHANGE UP (ref 2–14)
NEUTROPHILS # BLD AUTO: 4.86 K/UL — SIGNIFICANT CHANGE UP (ref 1.8–7.4)
NEUTROPHILS NFR BLD AUTO: 87 % — HIGH (ref 43–77)
NITRITE UR-MCNC: NEGATIVE — SIGNIFICANT CHANGE UP
NRBC # BLD: 0 /100 — SIGNIFICANT CHANGE UP (ref 0–0)
NRBC # BLD: SIGNIFICANT CHANGE UP /100 WBCS (ref 0–0)
PH UR: 6.5 — SIGNIFICANT CHANGE UP (ref 5–8)
PLAT MORPH BLD: NORMAL — SIGNIFICANT CHANGE UP
PLATELET # BLD AUTO: 267 K/UL — SIGNIFICANT CHANGE UP (ref 150–400)
POIKILOCYTOSIS BLD QL AUTO: SLIGHT — SIGNIFICANT CHANGE UP
PROT UR-MCNC: NEGATIVE — SIGNIFICANT CHANGE UP
RBC # BLD: 4.56 M/UL — SIGNIFICANT CHANGE UP (ref 3.8–5.2)
RBC # FLD: 12.2 % — SIGNIFICANT CHANGE UP (ref 10.3–14.5)
RBC BLD AUTO: ABNORMAL
RBC CASTS # UR COMP ASSIST: 2 /HPF — SIGNIFICANT CHANGE UP (ref 0–4)
SP GR SPEC: 1.01 — LOW (ref 1.01–1.02)
UROBILINOGEN FLD QL: SIGNIFICANT CHANGE UP
WBC # BLD: 5.59 K/UL — SIGNIFICANT CHANGE UP (ref 3.8–10.5)
WBC # FLD AUTO: 5.59 K/UL — SIGNIFICANT CHANGE UP (ref 3.8–10.5)
WBC UR QL: 7 /HPF — HIGH (ref 0–5)

## 2022-09-19 DIAGNOSIS — C50.911 MALIGNANT NEOPLASM OF UNSPECIFIED SITE OF RIGHT FEMALE BREAST: ICD-10-CM

## 2022-09-19 DIAGNOSIS — Z45.2 ENCOUNTER FOR ADJUSTMENT AND MANAGEMENT OF VASCULAR ACCESS DEVICE: ICD-10-CM

## 2022-09-22 ENCOUNTER — RESULT REVIEW (OUTPATIENT)
Age: 52
End: 2022-09-22

## 2022-09-22 ENCOUNTER — APPOINTMENT (OUTPATIENT)
Dept: HEMATOLOGY ONCOLOGY | Facility: CLINIC | Age: 52
End: 2022-09-22

## 2022-09-22 VITALS
SYSTOLIC BLOOD PRESSURE: 109 MMHG | HEIGHT: 61.42 IN | DIASTOLIC BLOOD PRESSURE: 76 MMHG | WEIGHT: 151.68 LBS | BODY MASS INDEX: 28.27 KG/M2 | RESPIRATION RATE: 16 BRPM | TEMPERATURE: 97.2 F | HEART RATE: 77 BPM | OXYGEN SATURATION: 97 %

## 2022-09-22 LAB
BASOPHILS # BLD AUTO: 0.04 K/UL — SIGNIFICANT CHANGE UP (ref 0–0.2)
BASOPHILS NFR BLD AUTO: 1.5 % — SIGNIFICANT CHANGE UP (ref 0–2)
EOSINOPHIL # BLD AUTO: 0.1 K/UL — SIGNIFICANT CHANGE UP (ref 0–0.5)
EOSINOPHIL NFR BLD AUTO: 3.8 % — SIGNIFICANT CHANGE UP (ref 0–6)
HCT VFR BLD CALC: 34 % — LOW (ref 34.5–45)
HGB BLD-MCNC: 11.4 G/DL — LOW (ref 11.5–15.5)
IMM GRANULOCYTES NFR BLD AUTO: 0.4 % — SIGNIFICANT CHANGE UP (ref 0–0.9)
LYMPHOCYTES # BLD AUTO: 0.76 K/UL — LOW (ref 1–3.3)
LYMPHOCYTES # BLD AUTO: 29 % — SIGNIFICANT CHANGE UP (ref 13–44)
MCHC RBC-ENTMCNC: 28.3 PG — SIGNIFICANT CHANGE UP (ref 27–34)
MCHC RBC-ENTMCNC: 33.5 G/DL — SIGNIFICANT CHANGE UP (ref 32–36)
MCV RBC AUTO: 84.4 FL — SIGNIFICANT CHANGE UP (ref 80–100)
MONOCYTES # BLD AUTO: 0.22 K/UL — SIGNIFICANT CHANGE UP (ref 0–0.9)
MONOCYTES NFR BLD AUTO: 8.4 % — SIGNIFICANT CHANGE UP (ref 2–14)
NEUTROPHILS # BLD AUTO: 1.49 K/UL — LOW (ref 1.8–7.4)
NEUTROPHILS NFR BLD AUTO: 56.9 % — SIGNIFICANT CHANGE UP (ref 43–77)
NRBC # BLD: 0 /100 WBCS — SIGNIFICANT CHANGE UP (ref 0–0)
PLATELET # BLD AUTO: 161 K/UL — SIGNIFICANT CHANGE UP (ref 150–400)
RBC # BLD: 4.03 M/UL — SIGNIFICANT CHANGE UP (ref 3.8–5.2)
RBC # FLD: 12.3 % — SIGNIFICANT CHANGE UP (ref 10.3–14.5)
WBC # BLD: 2.62 K/UL — LOW (ref 3.8–10.5)
WBC # FLD AUTO: 2.62 K/UL — LOW (ref 3.8–10.5)

## 2022-09-22 PROCEDURE — 99213 OFFICE O/P EST LOW 20 MIN: CPT

## 2022-09-22 RX ORDER — DEXAMETHASONE 4 MG/1
4 TABLET ORAL
Qty: 10 | Refills: 0 | Status: DISCONTINUED | COMMUNITY
Start: 2022-09-08 | End: 2022-09-22

## 2022-09-23 LAB
ALBUMIN SERPL ELPH-MCNC: 4.5 G/DL
ALP BLD-CCNC: 66 U/L
ALT SERPL-CCNC: 31 U/L
ANION GAP SERPL CALC-SCNC: 10 MMOL/L
AST SERPL-CCNC: 31 U/L
BILIRUB SERPL-MCNC: 0.2 MG/DL
BUN SERPL-MCNC: 12 MG/DL
CALCIUM SERPL-MCNC: 9.2 MG/DL
CHLORIDE SERPL-SCNC: 98 MMOL/L
CO2 SERPL-SCNC: 24 MMOL/L
CREAT SERPL-MCNC: 0.75 MG/DL
EGFR: 96 ML/MIN/1.73M2
GLUCOSE SERPL-MCNC: 97 MG/DL
HAV IGM SER QL: NONREACTIVE
HBV CORE IGM SER QL: NONREACTIVE
HBV SURFACE AG SER QL: NONREACTIVE
HCV AB SER QL: NONREACTIVE
HCV S/CO RATIO: 0.38 S/CO
MAGNESIUM SERPL-MCNC: 2.3 MG/DL
POTASSIUM SERPL-SCNC: 4.8 MMOL/L
PROT SERPL-MCNC: 7.5 G/DL
SODIUM SERPL-SCNC: 132 MMOL/L
TSH SERPL-ACNC: 1.69 UIU/ML

## 2022-10-02 NOTE — ASSESSMENT
[Palliative] : Goals of care discussed with patient: Palliative [Palliative Care Plan] : not applicable at this time [FreeTextEntry1] : 52 year old woman with advanced cervical and breast cancers on receiving treatment with Cisplatin, Taxol, Keytruda and Zirabev.\par She completed the first cycle and tolerated it well.\par Will get scans after 3 cycles to evaluate treatment response.\par Leg pain - continue Tylenol PRN.\par Numbness of fingertips - patient aware to notify team if it becomes persistent.\par Check labs today - CBC, CMP, Mg, TSH.\par RTC 3 weeks.

## 2022-10-02 NOTE — HISTORY OF PRESENT ILLNESS
[Disease: _____________________] : Disease: [unfilled] [de-identified] : 52 y.o female with newly diagnosed with cervical cancer in [facility].\par \par \par \par Ms. Sharma, 52 years old, accompanied by her daughter, Tammy and is referred by Dr. Charles, for HSIL (7/20/22 from [facility]) cervical mass, thickened endometrium noted on CT (from [facility]). 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 \par \par Pt is without symptoms; she has not seen a GYN in over 10 years and recalls never having an abnormal pap smear. She returned home to the [facility] to help her mother with medical problems and decided to have a GYN checkup at that time. Pap smear returned HSIL / +HPV. Pathology noted SCC moderately differentiated and CT scan identified cervical mass; thickened lining of the uterus; lymphadenopathy as well as a right breast mass and right axillary lymphadenopathy. \par \par She denies f/c/n/v/d/abnormal vaginal bleeding, changes to bowel or bladder habits. Denies unintentional weight loss or gain. Denies post-coital or intercycle vaginal bleeding; denies abdominal pain, distention, bloating, back pain or any other associated symptoms. \par \par Imaging:\par 7/22/20 ([facility], [facility])\par Breast: 1.6 x 1.7 cm tuberous, polycyclic contour, accumulating contrast medium, visualized at the borderline of the lower quadrants of the right breast, with no infiltration of the thoracic muscles. \par Right axillary region: 1.4 x 1.0 cm; 1.0 x 1.0 cm and 0.8 x 1.1 cm. lymphadenopathy. \par Liver: well defined even borders, the liver parenchyma is with signs of fat rearrangement, a hypdense inclusion 1.6 x 2.1 cm in size, is subcapsularly visualized Segment 8 of the liver. It lacunarly accumulates contrast medium, additionally accumulates contrast medium during the delayed phase. The intrahepatic and extrahepatic bile ducts are not dilated. \par Abdominal lymph nodes: paraaortic and interaortocaval lymph nodes are sized 10-12 mm, weakly accumulate contrast medium. \par No free fluid in the abdominal cavity. \par Uterus: 5.9 x 7.5 cm. \par EM: 15 mm with dense fluid visualized, mildly heterogeneous. \par Cervix: tissue lesion 56 x 42 mm invading the uterine isthmus and body. The parametria are infiltrated. \par Uterine appendages are normally positioned, a cystic inclusion of 3.3 x 3.5 cm is visualized in the projection of the left ovary. \par Right Ovary: 2.0 x 2.8 cm. \par Lymph Nodes:\par - right common iliac lymph node: 1.1 x 1.5 cm.\par - right pelvic wall node: 1.8 x 3.1 cm\par - left pelvic wall node: 2.4 x 3.0 cm. \par - right external iliac lymph node: 1.8 x 1.6 cm \par \par She was seen by Dr. 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 \par Menarche: 12\par Menopause: still menstruating. LMP 7/30/22 [FreeTextEntry1] : Cisplatin/Taxol/Keytruda/Zirabev\par C1 - 9/14/22 [de-identified] : Patient here with her daughter who provided interpretation at the patient's request.\par She tolerated the first cycle of chemo well.\par She had one day of decreased appetite.\par She had 3 days of lower extremity pain, got relief from Tylenol.\par She has intermittent mild numbness of fingertips.\par Denies fever, chills, chest pain, SOB, cough, abdominal pain, nausea, vomiting, diarrhea, constipation, bleeding, swelling.

## 2022-10-05 ENCOUNTER — RESULT REVIEW (OUTPATIENT)
Age: 52
End: 2022-10-05

## 2022-10-05 ENCOUNTER — APPOINTMENT (OUTPATIENT)
Dept: INFUSION THERAPY | Facility: HOSPITAL | Age: 52
End: 2022-10-05

## 2022-10-05 LAB
APPEARANCE UR: CLEAR — SIGNIFICANT CHANGE UP
BACTERIA # UR AUTO: NEGATIVE — SIGNIFICANT CHANGE UP
BASOPHILS # BLD AUTO: 0.07 K/UL — SIGNIFICANT CHANGE UP (ref 0–0.2)
BASOPHILS NFR BLD AUTO: 1.3 % — SIGNIFICANT CHANGE UP (ref 0–2)
BILIRUB UR-MCNC: NEGATIVE — SIGNIFICANT CHANGE UP
COLOR SPEC: SIGNIFICANT CHANGE UP
DIFF PNL FLD: ABNORMAL
EOSINOPHIL # BLD AUTO: 0.08 K/UL — SIGNIFICANT CHANGE UP (ref 0–0.5)
EOSINOPHIL NFR BLD AUTO: 1.5 % — SIGNIFICANT CHANGE UP (ref 0–6)
EPI CELLS # UR: 0 /HPF — SIGNIFICANT CHANGE UP (ref 0–5)
GLUCOSE UR QL: NEGATIVE — SIGNIFICANT CHANGE UP
HCT VFR BLD CALC: 36.4 % — SIGNIFICANT CHANGE UP (ref 34.5–45)
HGB BLD-MCNC: 12.4 G/DL — SIGNIFICANT CHANGE UP (ref 11.5–15.5)
HYALINE CASTS # UR AUTO: 0 /LPF — SIGNIFICANT CHANGE UP (ref 0–7)
IMM GRANULOCYTES NFR BLD AUTO: 2.1 % — HIGH (ref 0–0.9)
KETONES UR-MCNC: NEGATIVE — SIGNIFICANT CHANGE UP
LEUKOCYTE ESTERASE UR-ACNC: ABNORMAL
LYMPHOCYTES # BLD AUTO: 1.3 K/UL — SIGNIFICANT CHANGE UP (ref 1–3.3)
LYMPHOCYTES # BLD AUTO: 24.5 % — SIGNIFICANT CHANGE UP (ref 13–44)
MCHC RBC-ENTMCNC: 28.1 PG — SIGNIFICANT CHANGE UP (ref 27–34)
MCHC RBC-ENTMCNC: 34.1 G/DL — SIGNIFICANT CHANGE UP (ref 32–36)
MCV RBC AUTO: 82.5 FL — SIGNIFICANT CHANGE UP (ref 80–100)
MONOCYTES # BLD AUTO: 1.01 K/UL — HIGH (ref 0–0.9)
MONOCYTES NFR BLD AUTO: 19.1 % — HIGH (ref 2–14)
NEUTROPHILS # BLD AUTO: 2.73 K/UL — SIGNIFICANT CHANGE UP (ref 1.8–7.4)
NEUTROPHILS NFR BLD AUTO: 51.5 % — SIGNIFICANT CHANGE UP (ref 43–77)
NITRITE UR-MCNC: NEGATIVE — SIGNIFICANT CHANGE UP
NRBC # BLD: 0 /100 WBCS — SIGNIFICANT CHANGE UP (ref 0–0)
PH UR: 7.5 — SIGNIFICANT CHANGE UP (ref 5–8)
PLATELET # BLD AUTO: 268 K/UL — SIGNIFICANT CHANGE UP (ref 150–400)
PROT UR-MCNC: NEGATIVE — SIGNIFICANT CHANGE UP
RBC # BLD: 4.41 M/UL — SIGNIFICANT CHANGE UP (ref 3.8–5.2)
RBC # FLD: 12.6 % — SIGNIFICANT CHANGE UP (ref 10.3–14.5)
RBC CASTS # UR COMP ASSIST: 95 /HPF — HIGH (ref 0–4)
SP GR SPEC: 1.01 — LOW (ref 1.01–1.02)
UROBILINOGEN FLD QL: SIGNIFICANT CHANGE UP
WBC # BLD: 5.3 K/UL — SIGNIFICANT CHANGE UP (ref 3.8–10.5)
WBC # FLD AUTO: 5.3 K/UL — SIGNIFICANT CHANGE UP (ref 3.8–10.5)
WBC UR QL: 4 /HPF — SIGNIFICANT CHANGE UP (ref 0–5)

## 2022-10-11 PROBLEM — R59.1 GENERALIZED ENLARGED LYMPH NODES: Chronic | Status: ACTIVE | Noted: 2022-09-12

## 2022-10-11 PROBLEM — B97.7 PAPILLOMAVIRUS AS THE CAUSE OF DISEASES CLASSIFIED ELSEWHERE: Chronic | Status: ACTIVE | Noted: 2022-09-12

## 2022-10-19 ENCOUNTER — RESULT REVIEW (OUTPATIENT)
Age: 52
End: 2022-10-19

## 2022-10-19 ENCOUNTER — APPOINTMENT (OUTPATIENT)
Dept: HEMATOLOGY ONCOLOGY | Facility: CLINIC | Age: 52
End: 2022-10-19

## 2022-10-19 ENCOUNTER — OUTPATIENT (OUTPATIENT)
Dept: OUTPATIENT SERVICES | Facility: HOSPITAL | Age: 52
LOS: 1 days | Discharge: HOME | End: 2022-10-19

## 2022-10-19 VITALS
SYSTOLIC BLOOD PRESSURE: 139 MMHG | HEART RATE: 70 BPM | WEIGHT: 150.55 LBS | OXYGEN SATURATION: 98 % | RESPIRATION RATE: 16 BRPM | TEMPERATURE: 97.4 F | BODY MASS INDEX: 28.06 KG/M2 | DIASTOLIC BLOOD PRESSURE: 82 MMHG | HEIGHT: 61.42 IN

## 2022-10-19 DIAGNOSIS — C53.1 MALIGNANT NEOPLASM OF EXOCERVIX: ICD-10-CM

## 2022-10-19 LAB
BASOPHILS # BLD AUTO: 0.04 K/UL — SIGNIFICANT CHANGE UP (ref 0–0.2)
BASOPHILS NFR BLD AUTO: 1.3 % — SIGNIFICANT CHANGE UP (ref 0–2)
EOSINOPHIL # BLD AUTO: 0.09 K/UL — SIGNIFICANT CHANGE UP (ref 0–0.5)
EOSINOPHIL NFR BLD AUTO: 2.9 % — SIGNIFICANT CHANGE UP (ref 0–6)
HCT VFR BLD CALC: 35.6 % — SIGNIFICANT CHANGE UP (ref 34.5–45)
HGB BLD-MCNC: 11.8 G/DL — SIGNIFICANT CHANGE UP (ref 11.5–15.5)
IMM GRANULOCYTES NFR BLD AUTO: 0.3 % — SIGNIFICANT CHANGE UP (ref 0–0.9)
LYMPHOCYTES # BLD AUTO: 1.22 K/UL — SIGNIFICANT CHANGE UP (ref 1–3.3)
LYMPHOCYTES # BLD AUTO: 39.1 % — SIGNIFICANT CHANGE UP (ref 13–44)
MCHC RBC-ENTMCNC: 27.8 PG — SIGNIFICANT CHANGE UP (ref 27–34)
MCHC RBC-ENTMCNC: 33.1 G/DL — SIGNIFICANT CHANGE UP (ref 32–36)
MCV RBC AUTO: 83.8 FL — SIGNIFICANT CHANGE UP (ref 80–100)
MONOCYTES # BLD AUTO: 0.65 K/UL — SIGNIFICANT CHANGE UP (ref 0–0.9)
MONOCYTES NFR BLD AUTO: 20.8 % — HIGH (ref 2–14)
NEUTROPHILS # BLD AUTO: 1.11 K/UL — LOW (ref 1.8–7.4)
NEUTROPHILS NFR BLD AUTO: 35.6 % — LOW (ref 43–77)
NRBC # BLD: 0 /100 WBCS — SIGNIFICANT CHANGE UP (ref 0–0)
PLATELET # BLD AUTO: 202 K/UL — SIGNIFICANT CHANGE UP (ref 150–400)
RBC # BLD: 4.25 M/UL — SIGNIFICANT CHANGE UP (ref 3.8–5.2)
RBC # FLD: 13.2 % — SIGNIFICANT CHANGE UP (ref 10.3–14.5)
WBC # BLD: 3.12 K/UL — LOW (ref 3.8–10.5)
WBC # FLD AUTO: 3.12 K/UL — LOW (ref 3.8–10.5)

## 2022-10-19 PROCEDURE — 99214 OFFICE O/P EST MOD 30 MIN: CPT

## 2022-10-20 LAB
ALBUMIN SERPL ELPH-MCNC: 5 G/DL
ALP BLD-CCNC: 68 U/L
ALT SERPL-CCNC: 31 U/L
ANION GAP SERPL CALC-SCNC: 11 MMOL/L
AST SERPL-CCNC: 33 U/L
BILIRUB SERPL-MCNC: 0.2 MG/DL
BUN SERPL-MCNC: 12 MG/DL
CALCIUM SERPL-MCNC: 9.4 MG/DL
CHLORIDE SERPL-SCNC: 100 MMOL/L
CO2 SERPL-SCNC: 24 MMOL/L
CREAT SERPL-MCNC: 0.73 MG/DL
EGFR: 99 ML/MIN/1.73M2
GLUCOSE SERPL-MCNC: 96 MG/DL
MAGNESIUM SERPL-MCNC: 2.2 MG/DL
POTASSIUM SERPL-SCNC: 5.1 MMOL/L
PROT SERPL-MCNC: 8 G/DL
SODIUM SERPL-SCNC: 134 MMOL/L
TSH SERPL-ACNC: 2.16 UIU/ML

## 2022-10-20 NOTE — REASON FOR VISIT
[Follow-Up Visit] : a follow-up [Other: _____] : [unfilled] [FreeTextEntry2] : cervical cancer, breast cancer.

## 2022-10-20 NOTE — HISTORY OF PRESENT ILLNESS
[Disease: _____________________] : Disease: [unfilled] [de-identified] : 52 y.o female with newly diagnosed with cervical cancer in [facility].\par \par \par \par Ms. Sharma, 52 years old, accompanied by her daughter, Tammy and is referred by Dr. Charles, for HSIL (7/20/22 from [facility]) cervical mass, thickened endometrium noted on CT (from [facility]). 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 \par \par Pt is without symptoms; she has not seen a GYN in over 10 years and recalls never having an abnormal pap smear. She returned home to the [facility] to help her mother with medical problems and decided to have a GYN checkup at that time. Pap smear returned HSIL / +HPV. Pathology noted SCC moderately differentiated and CT scan identified cervical mass; thickened lining of the uterus; lymphadenopathy as well as a right breast mass and right axillary lymphadenopathy. \par \par She denies f/c/n/v/d/abnormal vaginal bleeding, changes to bowel or bladder habits. Denies unintentional weight loss or gain. Denies post-coital or intercycle vaginal bleeding; denies abdominal pain, distention, bloating, back pain or any other associated symptoms. \par \par Imaging:\par 7/22/20 ([facility], [facility])\par Breast: 1.6 x 1.7 cm tuberous, polycyclic contour, accumulating contrast medium, visualized at the borderline of the lower quadrants of the right breast, with no infiltration of the thoracic muscles. \par Right axillary region: 1.4 x 1.0 cm; 1.0 x 1.0 cm and 0.8 x 1.1 cm. lymphadenopathy. \par Liver: well defined even borders, the liver parenchyma is with signs of fat rearrangement, a hypdense inclusion 1.6 x 2.1 cm in size, is subcapsularly visualized Segment 8 of the liver. It lacunarly accumulates contrast medium, additionally accumulates contrast medium during the delayed phase. The intrahepatic and extrahepatic bile ducts are not dilated. \par Abdominal lymph nodes: paraaortic and interaortocaval lymph nodes are sized 10-12 mm, weakly accumulate contrast medium. \par No free fluid in the abdominal cavity. \par Uterus: 5.9 x 7.5 cm. \par EM: 15 mm with dense fluid visualized, mildly heterogeneous. \par Cervix: tissue lesion 56 x 42 mm invading the uterine isthmus and body. The parametria are infiltrated. \par Uterine appendages are normally positioned, a cystic inclusion of 3.3 x 3.5 cm is visualized in the projection of the left ovary. \par Right Ovary: 2.0 x 2.8 cm. \par Lymph Nodes:\par - right common iliac lymph node: 1.1 x 1.5 cm.\par - right pelvic wall node: 1.8 x 3.1 cm\par - left pelvic wall node: 2.4 x 3.0 cm. \par - right external iliac lymph node: 1.8 x 1.6 cm \par \par She was seen by Dr. 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 \par Menarche: 12\par Menopause: still menstruating. LMP 7/30/22 [Treatment Protocol] : Treatment Protocol [FreeTextEntry1] : Cisplatin/ Taxol/ Myesha/ Pembro\par C1- 9/14/2022\par C2- 10/5/2022 [de-identified] : Patient here for a f/u visit. She is tolerating treatment well.\par She denies any pain or bleeding.

## 2022-10-26 ENCOUNTER — RESULT REVIEW (OUTPATIENT)
Age: 52
End: 2022-10-26

## 2022-10-26 ENCOUNTER — NON-APPOINTMENT (OUTPATIENT)
Age: 52
End: 2022-10-26

## 2022-10-26 ENCOUNTER — APPOINTMENT (OUTPATIENT)
Dept: INFUSION THERAPY | Facility: HOSPITAL | Age: 52
End: 2022-10-26

## 2022-10-26 DIAGNOSIS — Z51.11 ENCOUNTER FOR ANTINEOPLASTIC CHEMOTHERAPY: ICD-10-CM

## 2022-10-26 DIAGNOSIS — E86.0 DEHYDRATION: ICD-10-CM

## 2022-10-26 DIAGNOSIS — R11.2 NAUSEA WITH VOMITING, UNSPECIFIED: ICD-10-CM

## 2022-10-26 DIAGNOSIS — L03.90 CELLULITIS, UNSPECIFIED: ICD-10-CM

## 2022-10-26 LAB
APPEARANCE UR: CLEAR — SIGNIFICANT CHANGE UP
APTT BLD: 30 SEC — SIGNIFICANT CHANGE UP (ref 27.5–35.5)
BACTERIA # UR AUTO: NEGATIVE — SIGNIFICANT CHANGE UP
BASOPHILS # BLD AUTO: 0.08 K/UL — SIGNIFICANT CHANGE UP (ref 0–0.2)
BASOPHILS NFR BLD AUTO: 1.6 % — SIGNIFICANT CHANGE UP (ref 0–2)
BILIRUB UR-MCNC: NEGATIVE — SIGNIFICANT CHANGE UP
COLOR SPEC: COLORLESS — SIGNIFICANT CHANGE UP
DIFF PNL FLD: ABNORMAL
EOSINOPHIL # BLD AUTO: 0.1 K/UL — SIGNIFICANT CHANGE UP (ref 0–0.5)
EOSINOPHIL NFR BLD AUTO: 2 % — SIGNIFICANT CHANGE UP (ref 0–6)
EPI CELLS # UR: 2 /HPF — SIGNIFICANT CHANGE UP (ref 0–5)
GLUCOSE UR QL: NEGATIVE — SIGNIFICANT CHANGE UP
HCT VFR BLD CALC: 34.8 % — SIGNIFICANT CHANGE UP (ref 34.5–45)
HGB BLD-MCNC: 11.6 G/DL — SIGNIFICANT CHANGE UP (ref 11.5–15.5)
HYALINE CASTS # UR AUTO: 1 /LPF — SIGNIFICANT CHANGE UP (ref 0–7)
IMM GRANULOCYTES NFR BLD AUTO: 0.4 % — SIGNIFICANT CHANGE UP (ref 0–0.9)
INR BLD: 0.99 RATIO — SIGNIFICANT CHANGE UP (ref 0.88–1.16)
KETONES UR-MCNC: NEGATIVE — SIGNIFICANT CHANGE UP
LEUKOCYTE ESTERASE UR-ACNC: NEGATIVE — SIGNIFICANT CHANGE UP
LYMPHOCYTES # BLD AUTO: 1.45 K/UL — SIGNIFICANT CHANGE UP (ref 1–3.3)
LYMPHOCYTES # BLD AUTO: 29.3 % — SIGNIFICANT CHANGE UP (ref 13–44)
MCHC RBC-ENTMCNC: 27.5 PG — SIGNIFICANT CHANGE UP (ref 27–34)
MCHC RBC-ENTMCNC: 33.3 G/DL — SIGNIFICANT CHANGE UP (ref 32–36)
MCV RBC AUTO: 82.5 FL — SIGNIFICANT CHANGE UP (ref 80–100)
MONOCYTES # BLD AUTO: 0.7 K/UL — SIGNIFICANT CHANGE UP (ref 0–0.9)
MONOCYTES NFR BLD AUTO: 14.1 % — HIGH (ref 2–14)
NEUTROPHILS # BLD AUTO: 2.6 K/UL — SIGNIFICANT CHANGE UP (ref 1.8–7.4)
NEUTROPHILS NFR BLD AUTO: 52.6 % — SIGNIFICANT CHANGE UP (ref 43–77)
NITRITE UR-MCNC: NEGATIVE — SIGNIFICANT CHANGE UP
NRBC # BLD: 0 /100 WBCS — SIGNIFICANT CHANGE UP (ref 0–0)
PH UR: 7 — SIGNIFICANT CHANGE UP (ref 5–8)
PLATELET # BLD AUTO: 256 K/UL — SIGNIFICANT CHANGE UP (ref 150–400)
PROT UR-MCNC: NEGATIVE — SIGNIFICANT CHANGE UP
PROTHROM AB SERPL-ACNC: 11.7 SEC — SIGNIFICANT CHANGE UP (ref 10.5–13.4)
RBC # BLD: 4.22 M/UL — SIGNIFICANT CHANGE UP (ref 3.8–5.2)
RBC # FLD: 13.4 % — SIGNIFICANT CHANGE UP (ref 10.3–14.5)
RBC CASTS # UR COMP ASSIST: 1 /HPF — SIGNIFICANT CHANGE UP (ref 0–4)
SARS-COV-2 RNA SPEC QL NAA+PROBE: SIGNIFICANT CHANGE UP
SP GR SPEC: 1.01 — SIGNIFICANT CHANGE UP (ref 1.01–1.02)
UROBILINOGEN FLD QL: SIGNIFICANT CHANGE UP
WBC # BLD: 4.95 K/UL — SIGNIFICANT CHANGE UP (ref 3.8–10.5)
WBC # FLD AUTO: 4.95 K/UL — SIGNIFICANT CHANGE UP (ref 3.8–10.5)
WBC UR QL: 2 /HPF — SIGNIFICANT CHANGE UP (ref 0–5)

## 2022-11-09 ENCOUNTER — RESULT REVIEW (OUTPATIENT)
Age: 52
End: 2022-11-09

## 2022-11-09 ENCOUNTER — APPOINTMENT (OUTPATIENT)
Dept: HEMATOLOGY ONCOLOGY | Facility: CLINIC | Age: 52
End: 2022-11-09

## 2022-11-09 VITALS
RESPIRATION RATE: 16 BRPM | HEIGHT: 61.42 IN | DIASTOLIC BLOOD PRESSURE: 84 MMHG | BODY MASS INDEX: 28.14 KG/M2 | HEART RATE: 77 BPM | SYSTOLIC BLOOD PRESSURE: 130 MMHG | OXYGEN SATURATION: 97 % | WEIGHT: 150.99 LBS | TEMPERATURE: 97 F

## 2022-11-09 LAB
BASOPHILS # BLD AUTO: 0.07 K/UL — SIGNIFICANT CHANGE UP (ref 0–0.2)
BASOPHILS NFR BLD AUTO: 2 % — SIGNIFICANT CHANGE UP (ref 0–2)
ELLIPTOCYTES BLD QL SMEAR: SLIGHT — SIGNIFICANT CHANGE UP
EOSINOPHIL # BLD AUTO: 0.11 K/UL — SIGNIFICANT CHANGE UP (ref 0–0.5)
EOSINOPHIL NFR BLD AUTO: 3 % — SIGNIFICANT CHANGE UP (ref 0–6)
HCT VFR BLD CALC: 35.2 % — SIGNIFICANT CHANGE UP (ref 34.5–45)
HGB BLD-MCNC: 11.6 G/DL — SIGNIFICANT CHANGE UP (ref 11.5–15.5)
LYMPHOCYTES # BLD AUTO: 1.17 K/UL — SIGNIFICANT CHANGE UP (ref 1–3.3)
LYMPHOCYTES # BLD AUTO: 32 % — SIGNIFICANT CHANGE UP (ref 13–44)
MCHC RBC-ENTMCNC: 27.3 PG — SIGNIFICANT CHANGE UP (ref 27–34)
MCHC RBC-ENTMCNC: 33 G/DL — SIGNIFICANT CHANGE UP (ref 32–36)
MCV RBC AUTO: 82.8 FL — SIGNIFICANT CHANGE UP (ref 80–100)
MONOCYTES # BLD AUTO: 0.73 K/UL — SIGNIFICANT CHANGE UP (ref 0–0.9)
MONOCYTES NFR BLD AUTO: 20 % — HIGH (ref 2–14)
NEUTROPHILS # BLD AUTO: 1.57 K/UL — LOW (ref 1.8–7.4)
NEUTROPHILS NFR BLD AUTO: 43 % — SIGNIFICANT CHANGE UP (ref 43–77)
NRBC # BLD: 0 /100 — SIGNIFICANT CHANGE UP (ref 0–0)
NRBC # BLD: SIGNIFICANT CHANGE UP /100 WBCS (ref 0–0)
PLAT MORPH BLD: NORMAL — SIGNIFICANT CHANGE UP
PLATELET # BLD AUTO: 188 K/UL — SIGNIFICANT CHANGE UP (ref 150–400)
POIKILOCYTOSIS BLD QL AUTO: SLIGHT — SIGNIFICANT CHANGE UP
RBC # BLD: 4.25 M/UL — SIGNIFICANT CHANGE UP (ref 3.8–5.2)
RBC # FLD: 13.9 % — SIGNIFICANT CHANGE UP (ref 10.3–14.5)
RBC BLD AUTO: ABNORMAL
WBC # BLD: 3.66 K/UL — LOW (ref 3.8–10.5)
WBC # FLD AUTO: 3.66 K/UL — LOW (ref 3.8–10.5)

## 2022-11-09 PROCEDURE — 99213 OFFICE O/P EST LOW 20 MIN: CPT

## 2022-11-10 LAB
ALBUMIN SERPL ELPH-MCNC: 4.6 G/DL
ALP BLD-CCNC: 72 U/L
ALT SERPL-CCNC: 26 U/L
ANION GAP SERPL CALC-SCNC: 11 MMOL/L
AST SERPL-CCNC: 30 U/L
BILIRUB SERPL-MCNC: 0.2 MG/DL
BUN SERPL-MCNC: 11 MG/DL
CALCIUM SERPL-MCNC: 9.6 MG/DL
CHLORIDE SERPL-SCNC: 96 MMOL/L
CO2 SERPL-SCNC: 23 MMOL/L
CREAT SERPL-MCNC: 0.81 MG/DL
EGFR: 87 ML/MIN/1.73M2
GLUCOSE SERPL-MCNC: 96 MG/DL
MAGNESIUM SERPL-MCNC: 2.1 MG/DL
POTASSIUM SERPL-SCNC: 5.2 MMOL/L
PROT SERPL-MCNC: 7.7 G/DL
SODIUM SERPL-SCNC: 130 MMOL/L

## 2022-11-11 NOTE — HISTORY OF PRESENT ILLNESS
[Disease: _____________________] : Disease: [unfilled] [Treatment Protocol] : Treatment Protocol [de-identified] : 52 y.o female with newly diagnosed with cervical cancer in [facility].\par \par \par \par Ms. Sharma, 52 years old, accompanied by her daughter, Tammy and is referred by Dr. Charles, for HSIL (7/20/22 from [facility]) cervical mass, thickened endometrium noted on CT (from [facility]). 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 \par \par Pt is without symptoms; she has not seen a GYN in over 10 years and recalls never having an abnormal pap smear. She returned home to the [facility] to help her mother with medical problems and decided to have a GYN checkup at that time. Pap smear returned HSIL / +HPV. Pathology noted SCC moderately differentiated and CT scan identified cervical mass; thickened lining of the uterus; lymphadenopathy as well as a right breast mass and right axillary lymphadenopathy. \par \par She denies f/c/n/v/d/abnormal vaginal bleeding, changes to bowel or bladder habits. Denies unintentional weight loss or gain. Denies post-coital or intercycle vaginal bleeding; denies abdominal pain, distention, bloating, back pain or any other associated symptoms. \par \par Imaging:\par 7/22/20 ([facility], [facility])\par Breast: 1.6 x 1.7 cm tuberous, polycyclic contour, accumulating contrast medium, visualized at the borderline of the lower quadrants of the right breast, with no infiltration of the thoracic muscles. \par Right axillary region: 1.4 x 1.0 cm; 1.0 x 1.0 cm and 0.8 x 1.1 cm. lymphadenopathy. \par Liver: well defined even borders, the liver parenchyma is with signs of fat rearrangement, a hypdense inclusion 1.6 x 2.1 cm in size, is subcapsularly visualized Segment 8 of the liver. It lacunarly accumulates contrast medium, additionally accumulates contrast medium during the delayed phase. The intrahepatic and extrahepatic bile ducts are not dilated. \par Abdominal lymph nodes: paraaortic and interaortocaval lymph nodes are sized 10-12 mm, weakly accumulate contrast medium. \par No free fluid in the abdominal cavity. \par Uterus: 5.9 x 7.5 cm. \par EM: 15 mm with dense fluid visualized, mildly heterogeneous. \par Cervix: tissue lesion 56 x 42 mm invading the uterine isthmus and body. The parametria are infiltrated. \par Uterine appendages are normally positioned, a cystic inclusion of 3.3 x 3.5 cm is visualized in the projection of the left ovary. \par Right Ovary: 2.0 x 2.8 cm. \par Lymph Nodes:\par - right common iliac lymph node: 1.1 x 1.5 cm.\par - right pelvic wall node: 1.8 x 3.1 cm\par - left pelvic wall node: 2.4 x 3.0 cm. \par - right external iliac lymph node: 1.8 x 1.6 cm \par \par She was seen by Dr. 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 \par Menarche: 12\par Menopause: still menstruating. LMP 7/30/22 [FreeTextEntry1] : Cisplatin/ Taxol/ Myesha/ Pembro\par C1- 9/14/2022\par C2- 10/5/2022\par C3 - 10/26/22 [de-identified] : Patient is here after 3 cycles of chemo, continues to tolerate treatment well.  No new complaints.  She had transient neuropathy of her toes which resolved.\par She had a superficial infection of her mediport which has healed well, checked by IR.  She had some diarrhea while taking antibiotics and did not complete the course but diarrhea resolved.  Denies fever, chills, chest pain, SOB, abdominal pain, nausea, vomiting, diarrhea, constipation, bleeding, swelling.

## 2022-11-11 NOTE — ASSESSMENT
[Palliative] : Goals of care discussed with patient: Palliative [Palliative Care Plan] : not applicable at this time [FreeTextEntry1] : 52 year old woman with cervical and breast cancers s/p 3 cycles of Carboplatin, Taxol, Zirabev and Keytruda.\par Pending scans to evaluate treatment response.\par Transient neuropathy - patient and her daughter aware to call with any increase in symptoms.\par Will have done prior to cycle #4.\par Check labs - CBC, CMP, Mg, TSH.\par RTC 3 weeks.

## 2022-11-14 ENCOUNTER — APPOINTMENT (OUTPATIENT)
Dept: CT IMAGING | Facility: IMAGING CENTER | Age: 52
End: 2022-11-14
Payer: MEDICAID

## 2022-11-14 ENCOUNTER — OUTPATIENT (OUTPATIENT)
Dept: OUTPATIENT SERVICES | Facility: HOSPITAL | Age: 52
LOS: 1 days | End: 2022-11-14
Payer: MEDICAID

## 2022-11-14 DIAGNOSIS — C50.911 MALIGNANT NEOPLASM OF UNSPECIFIED SITE OF RIGHT FEMALE BREAST: ICD-10-CM

## 2022-11-14 DIAGNOSIS — Z00.8 ENCOUNTER FOR OTHER GENERAL EXAMINATION: ICD-10-CM

## 2022-11-14 PROCEDURE — 71250 CT THORAX DX C-: CPT | Mod: 26

## 2022-11-14 PROCEDURE — 74177 CT ABD & PELVIS W/CONTRAST: CPT | Mod: 26

## 2022-11-14 PROCEDURE — 71250 CT THORAX DX C-: CPT

## 2022-11-14 PROCEDURE — 74177 CT ABD & PELVIS W/CONTRAST: CPT

## 2022-11-16 ENCOUNTER — RESULT REVIEW (OUTPATIENT)
Age: 52
End: 2022-11-16

## 2022-11-16 ENCOUNTER — APPOINTMENT (OUTPATIENT)
Dept: INFUSION THERAPY | Facility: HOSPITAL | Age: 52
End: 2022-11-16

## 2022-11-16 ENCOUNTER — APPOINTMENT (OUTPATIENT)
Dept: HEMATOLOGY ONCOLOGY | Facility: CLINIC | Age: 52
End: 2022-11-16

## 2022-11-16 LAB
ALBUMIN SERPL ELPH-MCNC: 4.3 G/DL — SIGNIFICANT CHANGE UP (ref 3.3–5)
ALP SERPL-CCNC: 67 U/L — SIGNIFICANT CHANGE UP (ref 40–120)
ALT FLD-CCNC: 22 U/L — SIGNIFICANT CHANGE UP (ref 10–45)
ANION GAP SERPL CALC-SCNC: 13 MMOL/L — SIGNIFICANT CHANGE UP (ref 5–17)
AST SERPL-CCNC: 26 U/L — SIGNIFICANT CHANGE UP (ref 10–40)
BASOPHILS # BLD AUTO: 0.05 K/UL — SIGNIFICANT CHANGE UP (ref 0–0.2)
BASOPHILS NFR BLD AUTO: 1.2 % — SIGNIFICANT CHANGE UP (ref 0–2)
BILIRUB SERPL-MCNC: 0.2 MG/DL — SIGNIFICANT CHANGE UP (ref 0.2–1.2)
BUN SERPL-MCNC: 14 MG/DL — SIGNIFICANT CHANGE UP (ref 7–23)
CALCIUM SERPL-MCNC: 9.4 MG/DL — SIGNIFICANT CHANGE UP (ref 8.4–10.5)
CHLORIDE SERPL-SCNC: 100 MMOL/L — SIGNIFICANT CHANGE UP (ref 96–108)
CO2 SERPL-SCNC: 20 MMOL/L — LOW (ref 22–31)
CREAT SERPL-MCNC: 0.79 MG/DL — SIGNIFICANT CHANGE UP (ref 0.5–1.3)
EGFR: 90 ML/MIN/1.73M2 — SIGNIFICANT CHANGE UP
EOSINOPHIL # BLD AUTO: 0.09 K/UL — SIGNIFICANT CHANGE UP (ref 0–0.5)
EOSINOPHIL NFR BLD AUTO: 2.1 % — SIGNIFICANT CHANGE UP (ref 0–6)
GLUCOSE SERPL-MCNC: 91 MG/DL — SIGNIFICANT CHANGE UP (ref 70–99)
HCT VFR BLD CALC: 34.1 % — LOW (ref 34.5–45)
HGB BLD-MCNC: 11.5 G/DL — SIGNIFICANT CHANGE UP (ref 11.5–15.5)
IMM GRANULOCYTES NFR BLD AUTO: 0.5 % — SIGNIFICANT CHANGE UP (ref 0–0.9)
LYMPHOCYTES # BLD AUTO: 1.42 K/UL — SIGNIFICANT CHANGE UP (ref 1–3.3)
LYMPHOCYTES # BLD AUTO: 33.4 % — SIGNIFICANT CHANGE UP (ref 13–44)
MCHC RBC-ENTMCNC: 27.1 PG — SIGNIFICANT CHANGE UP (ref 27–34)
MCHC RBC-ENTMCNC: 33.7 G/DL — SIGNIFICANT CHANGE UP (ref 32–36)
MCV RBC AUTO: 80.2 FL — SIGNIFICANT CHANGE UP (ref 80–100)
MONOCYTES # BLD AUTO: 0.68 K/UL — SIGNIFICANT CHANGE UP (ref 0–0.9)
MONOCYTES NFR BLD AUTO: 16 % — HIGH (ref 2–14)
NEUTROPHILS # BLD AUTO: 1.99 K/UL — SIGNIFICANT CHANGE UP (ref 1.8–7.4)
NEUTROPHILS NFR BLD AUTO: 46.8 % — SIGNIFICANT CHANGE UP (ref 43–77)
NRBC # BLD: 0 /100 WBCS — SIGNIFICANT CHANGE UP (ref 0–0)
PLATELET # BLD AUTO: 218 K/UL — SIGNIFICANT CHANGE UP (ref 150–400)
POTASSIUM SERPL-MCNC: 4.4 MMOL/L — SIGNIFICANT CHANGE UP (ref 3.5–5.3)
POTASSIUM SERPL-SCNC: 4.4 MMOL/L — SIGNIFICANT CHANGE UP (ref 3.5–5.3)
PROT SERPL-MCNC: 7.1 G/DL — SIGNIFICANT CHANGE UP (ref 6–8.3)
RBC # BLD: 4.25 M/UL — SIGNIFICANT CHANGE UP (ref 3.8–5.2)
RBC # FLD: 14.1 % — SIGNIFICANT CHANGE UP (ref 10.3–14.5)
SODIUM SERPL-SCNC: 132 MMOL/L — LOW (ref 135–145)
WBC # BLD: 4.25 K/UL — SIGNIFICANT CHANGE UP (ref 3.8–10.5)
WBC # FLD AUTO: 4.25 K/UL — SIGNIFICANT CHANGE UP (ref 3.8–10.5)

## 2022-11-17 LAB
APPEARANCE UR: CLEAR — SIGNIFICANT CHANGE UP
BILIRUB UR-MCNC: NEGATIVE — SIGNIFICANT CHANGE UP
COLOR SPEC: COLORLESS — SIGNIFICANT CHANGE UP
DIFF PNL FLD: NEGATIVE — SIGNIFICANT CHANGE UP
GLUCOSE UR QL: NEGATIVE — SIGNIFICANT CHANGE UP
KETONES UR-MCNC: NEGATIVE — SIGNIFICANT CHANGE UP
LEUKOCYTE ESTERASE UR-ACNC: NEGATIVE — SIGNIFICANT CHANGE UP
NITRITE UR-MCNC: NEGATIVE — SIGNIFICANT CHANGE UP
PH UR: 6 — SIGNIFICANT CHANGE UP (ref 5–8)
PROT UR-MCNC: NEGATIVE — SIGNIFICANT CHANGE UP
SP GR SPEC: 1.01 — SIGNIFICANT CHANGE UP (ref 1.01–1.02)
UROBILINOGEN FLD QL: SIGNIFICANT CHANGE UP

## 2022-11-30 ENCOUNTER — APPOINTMENT (OUTPATIENT)
Dept: GYNECOLOGIC ONCOLOGY | Facility: CLINIC | Age: 52
End: 2022-11-30

## 2022-11-30 ENCOUNTER — RESULT REVIEW (OUTPATIENT)
Age: 52
End: 2022-11-30

## 2022-11-30 ENCOUNTER — APPOINTMENT (OUTPATIENT)
Dept: HEMATOLOGY ONCOLOGY | Facility: CLINIC | Age: 52
End: 2022-11-30

## 2022-11-30 VITALS
WEIGHT: 147 LBS | BODY MASS INDEX: 27.4 KG/M2 | DIASTOLIC BLOOD PRESSURE: 85 MMHG | HEIGHT: 61.4 IN | HEART RATE: 83 BPM | SYSTOLIC BLOOD PRESSURE: 144 MMHG

## 2022-11-30 VITALS
DIASTOLIC BLOOD PRESSURE: 84 MMHG | TEMPERATURE: 97.1 F | HEART RATE: 80 BPM | WEIGHT: 149.47 LBS | SYSTOLIC BLOOD PRESSURE: 127 MMHG | HEIGHT: 62 IN | OXYGEN SATURATION: 98 % | RESPIRATION RATE: 16 BRPM | BODY MASS INDEX: 27.51 KG/M2

## 2022-11-30 LAB
ALBUMIN SERPL ELPH-MCNC: 4.7 G/DL
ALP BLD-CCNC: 70 U/L
ALT SERPL-CCNC: 24 U/L
ANION GAP SERPL CALC-SCNC: 11 MMOL/L
AST SERPL-CCNC: 29 U/L
BASOPHILS # BLD AUTO: 0.06 K/UL — SIGNIFICANT CHANGE UP (ref 0–0.2)
BASOPHILS NFR BLD AUTO: 2 % — SIGNIFICANT CHANGE UP (ref 0–2)
BILIRUB SERPL-MCNC: 0.2 MG/DL
BUN SERPL-MCNC: 11 MG/DL
CALCIUM SERPL-MCNC: 10 MG/DL
CHLORIDE SERPL-SCNC: 98 MMOL/L
CO2 SERPL-SCNC: 24 MMOL/L
CREAT SERPL-MCNC: 0.83 MG/DL
EGFR: 85 ML/MIN/1.73M2
ELLIPTOCYTES BLD QL SMEAR: SLIGHT — SIGNIFICANT CHANGE UP
EOSINOPHIL # BLD AUTO: 0.03 K/UL — SIGNIFICANT CHANGE UP (ref 0–0.5)
EOSINOPHIL NFR BLD AUTO: 1 % — SIGNIFICANT CHANGE UP (ref 0–6)
GLUCOSE SERPL-MCNC: 101 MG/DL
HCT VFR BLD CALC: 35 % — SIGNIFICANT CHANGE UP (ref 34.5–45)
HGB BLD-MCNC: 11.4 G/DL — LOW (ref 11.5–15.5)
LYMPHOCYTES # BLD AUTO: 1.14 K/UL — SIGNIFICANT CHANGE UP (ref 1–3.3)
LYMPHOCYTES # BLD AUTO: 39 % — SIGNIFICANT CHANGE UP (ref 13–44)
MAGNESIUM SERPL-MCNC: 2.1 MG/DL
MCHC RBC-ENTMCNC: 26.7 PG — LOW (ref 27–34)
MCHC RBC-ENTMCNC: 32.6 G/DL — SIGNIFICANT CHANGE UP (ref 32–36)
MCV RBC AUTO: 82 FL — SIGNIFICANT CHANGE UP (ref 80–100)
MONOCYTES # BLD AUTO: 0.64 K/UL — SIGNIFICANT CHANGE UP (ref 0–0.9)
MONOCYTES NFR BLD AUTO: 22 % — HIGH (ref 2–14)
NEUTROPHILS # BLD AUTO: 1.02 K/UL — LOW (ref 1.8–7.4)
NEUTROPHILS NFR BLD AUTO: 35 % — LOW (ref 43–77)
NRBC # BLD: 0 /100 — SIGNIFICANT CHANGE UP (ref 0–0)
NRBC # BLD: SIGNIFICANT CHANGE UP /100 WBCS (ref 0–0)
PLAT MORPH BLD: NORMAL — SIGNIFICANT CHANGE UP
PLATELET # BLD AUTO: 184 K/UL — SIGNIFICANT CHANGE UP (ref 150–400)
POIKILOCYTOSIS BLD QL AUTO: SLIGHT — SIGNIFICANT CHANGE UP
POTASSIUM SERPL-SCNC: 5 MMOL/L
PROT SERPL-MCNC: 7.5 G/DL
RBC # BLD: 4.27 M/UL — SIGNIFICANT CHANGE UP (ref 3.8–5.2)
RBC # FLD: 14.6 % — HIGH (ref 10.3–14.5)
RBC BLD AUTO: ABNORMAL
SODIUM SERPL-SCNC: 132 MMOL/L
VARIANT LYMPHS # BLD: 1 % — SIGNIFICANT CHANGE UP (ref 0–6)
WBC # BLD: 2.92 K/UL — LOW (ref 3.8–10.5)
WBC # FLD AUTO: 2.92 K/UL — LOW (ref 3.8–10.5)

## 2022-11-30 PROCEDURE — 99213 OFFICE O/P EST LOW 20 MIN: CPT

## 2022-11-30 RX ORDER — CEPHALEXIN 500 MG/1
500 CAPSULE ORAL EVERY 6 HOURS
Qty: 28 | Refills: 0 | Status: DISCONTINUED | COMMUNITY
Start: 2022-10-26 | End: 2022-11-30

## 2022-11-30 NOTE — DISCUSSION/SUMMARY
[FreeTextEntry1] : \par -  clinical exam noted improvement to cervical mass, but is still visible on exam. \par -  She will continue with scheduled treatment.  \par -  Advised to follow up with breast management (Dr. Acosta and Dr. Perrin) to discuss next steps (surgery and/or AI and/or tamoxifen, etc).  \par -  She will follow up with Dr. Holm in 3 months\par -  s/s of progression discussed and advised to come earlier if concerned.\par -  Pt's daughter verbalized understanding of plan and agree.\par -  All questions answered to patient's satisfaction.\par -  Will notify Dr. Holm of today's exam.  Pt aware Dr. Holm will follow up via phone when she returns.  \par

## 2022-11-30 NOTE — HISTORY OF PRESENT ILLNESS
[FreeTextEntry1] : Med Onc/ Ref:  Dr. Rain Charles\par PCP:  Dr. Heather Kumari\par Breast Med Onc:  Dr. Kaye Perrin\par Breast Surgeon:  Dr. Acosta. \par \par Ms. Sharma, 52 years old, accompanied by her daughter, Tammy and is referred by Dr. Charles, for HSIL (7/20/22 from [facility]) cervical mass, thickened endometrium noted on CT (from [facility]).  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  \par \par Pt is without symptoms; she has not seen a GYN in over 10 years and recalls never having an abnormal pap smear.  She returned home to the [facility] to help her mother with medical problems and decided to have a GYN checkup at that time.  Pap smear returned HSIL / +HPV.  Pathology noted SCC moderately differentiated and CT scan identified cervical mass; thickened lining of the uterus; lymphadenopathy as well as a right breast mass and right axillary lymphadenopathy.  \par \par Imaging:\par CT C/A/P 11/14/22\par -Resolved previously seen lymphadenopathy\par -No CT evidence for residual right breast mass however dedicated right breast imaging or PET/CT would be more sensitive.\par -Decreased overall size of the cervical mass which would be best assessed by pelvic MRI\par \par PET/CT 8/18/22\par 1. Difficult to delineate large area of intense activity within the uterine cavity extending into the uterine cervix corresponding to known uterine malignancy. Nonspecific focal FDG avidity in the vaginal region, concerning for disease involvement.\par 2. FDG avid retroperitoneal and bilateral pelvic lymphadenopathy, compatible with metastases from uterine malignancy.\par 3. FDG avid right breast nodule likely representing malignancy.\par 4. FDG avid right retropectoral, right axillary, and right internal mammary lymph nodes suspicious for metastasis from breast malignancy. Mildly FDG avid prevascular and left retropectoral lymph nodes are indeterminate.\par 5. Nonspecific approximately symmetric FDG avidity in bilateral palatine tonsils, probably inflammatory. Please correlate clinically and with direct visualization.\par 6. Nonspecific generalized bone marrow hypermetabolism without CT correlate, probably reactive and may be related to anemia. Please correlate clinically.\par \par Current Treatment:  Cisplatin/Taxol/Myesha/Pembro\par C1 = 9/14/22\par C2 = 10/5/22\par C3 = 10/26/22\par \par She overall feels well.  Some intermittent neuropathy.  Denies f/c/n/v/d/vaginal bleeding, changes to bowel or bladder habits.  Denies unintentional weight loss or gain.  Denies abdominal pain, bloating or any other associated symptoms.  \par \par Health Maintenance:\par BMI: 27\par Lifestyle:  sexually active.  \par COVID vaccine received:  yes\par Mammogram: 7/26/22 - abnromal\par Colonoscopy:none\par PAP: 7/20/2022:  +HPV 16; HSIL\par

## 2022-11-30 NOTE — ASSESSMENT
[FreeTextEntry1] : \par  86F with intermittent dizziness upon standing for 2 days, + urinary frequency. No syncope, no chest pain, no sob. No e/o arrhythmia, no actual syncope, EKG with nl intervals. Doubt PE no sob, doubt acs no cp/sob. Doubt pna no cough, + UTI, likely cause of dizziness, given gentle fluids, given nitrofurantoin. Will dc home to f/u with neuro, doubt cerebellar stroke, gait is steady fnf limited by sight issues at baseline, neg romberg, finger taps/foot taps intact bl. Will dc home to f/u with neuro, cards. Pt declines admission/obs at this time, states she feels better.

## 2022-11-30 NOTE — PHYSICAL EXAM
[Normal] : Parametria: Normal [de-identified] : mass is present on cervix about 2 cm; firm.  Cervix on exam is mobile.  No bleeding.  [de-identified] : not palpable [de-identified] : smooth [de-identified] : No culdesac nodules

## 2022-12-01 NOTE — ASSESSMENT
[Palliative] : Goals of care discussed with patient: Palliative [Palliative Care Plan] : not applicable at this time [FreeTextEntry1] : 52 year old woman with cervical and breast cancers s/p 3 cycles of Carboplatin, Taxol, Zirabev and Keytruda.\par Scans after third cycle showed good response to treatment.\par Continue current regimen and repeat scans again after 6th cycle.\par Will follow up with breast surgeon - Dr. Acosta.\par Transient neuropathy - patient and her daughter aware to call with any increase in symptoms.\par Check labs - CBC, CMP, Mg, TSH.\par RTC 3 weeks.

## 2022-12-01 NOTE — HISTORY OF PRESENT ILLNESS
[Disease: _____________________] : Disease: [unfilled] [Treatment Protocol] : Treatment Protocol [de-identified] : 52 y.o female with newly diagnosed with cervical cancer in [facility].\par \par \par \par Ms. hSarma, 52 years old, accompanied by her daughter, Tammy and is referred by Dr. Charles, for HSIL (7/20/22 from [facility]) cervical mass, thickened endometrium noted on CT (from [facility]). 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 \par \par Pt is without symptoms; she has not seen a GYN in over 10 years and recalls never having an abnormal pap smear. She returned home to the [facility] to help her mother with medical problems and decided to have a GYN checkup at that time. Pap smear returned HSIL / +HPV. Pathology noted SCC moderately differentiated and CT scan identified cervical mass; thickened lining of the uterus; lymphadenopathy as well as a right breast mass and right axillary lymphadenopathy. \par \par She denies f/c/n/v/d/abnormal vaginal bleeding, changes to bowel or bladder habits. Denies unintentional weight loss or gain. Denies post-coital or intercycle vaginal bleeding; denies abdominal pain, distention, bloating, back pain or any other associated symptoms. \par \par Imaging:\par 7/22/20 ([facility], [facility])\par Breast: 1.6 x 1.7 cm tuberous, polycyclic contour, accumulating contrast medium, visualized at the borderline of the lower quadrants of the right breast, with no infiltration of the thoracic muscles. \par Right axillary region: 1.4 x 1.0 cm; 1.0 x 1.0 cm and 0.8 x 1.1 cm. lymphadenopathy. \par Liver: well defined even borders, the liver parenchyma is with signs of fat rearrangement, a hypdense inclusion 1.6 x 2.1 cm in size, is subcapsularly visualized Segment 8 of the liver. It lacunarly accumulates contrast medium, additionally accumulates contrast medium during the delayed phase. The intrahepatic and extrahepatic bile ducts are not dilated. \par Abdominal lymph nodes: paraaortic and interaortocaval lymph nodes are sized 10-12 mm, weakly accumulate contrast medium. \par No free fluid in the abdominal cavity. \par Uterus: 5.9 x 7.5 cm. \par EM: 15 mm with dense fluid visualized, mildly heterogeneous. \par Cervix: tissue lesion 56 x 42 mm invading the uterine isthmus and body. The parametria are infiltrated. \par Uterine appendages are normally positioned, a cystic inclusion of 3.3 x 3.5 cm is visualized in the projection of the left ovary. \par Right Ovary: 2.0 x 2.8 cm. \par Lymph Nodes:\par - right common iliac lymph node: 1.1 x 1.5 cm.\par - right pelvic wall node: 1.8 x 3.1 cm\par - left pelvic wall node: 2.4 x 3.0 cm. \par - right external iliac lymph node: 1.8 x 1.6 cm \par \par She was seen by Dr. 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 \par Menarche: 12\par Menopause: still menstruating. LMP 7/30/22 [FreeTextEntry1] : Cisplatin/ Taxol/ Myesha/ Pembro\par C1- 9/14/2022\par C2- 10/5/2022\par C3 - 10/26/22\par C4 - 11/16/22 [de-identified] : Patient is here after her 4th cycle of chemo.\par No new complaints, continues to tolerate treatment well.\par Saw gyn/onc yesterday, has not seen breast surgeon.\par Good appetite and energy levels.\par No increase in neuropathy, still mild and transient.\par Denies nausea, vomiting, bowel or bladder issues, bleeding, swelling.

## 2022-12-07 ENCOUNTER — RESULT REVIEW (OUTPATIENT)
Age: 52
End: 2022-12-07

## 2022-12-07 ENCOUNTER — APPOINTMENT (OUTPATIENT)
Dept: INFUSION THERAPY | Facility: HOSPITAL | Age: 52
End: 2022-12-07

## 2022-12-07 ENCOUNTER — APPOINTMENT (OUTPATIENT)
Dept: HEMATOLOGY ONCOLOGY | Facility: CLINIC | Age: 52
End: 2022-12-07

## 2022-12-07 LAB
APTT BLD: 32.2 SEC — SIGNIFICANT CHANGE UP (ref 27.5–35.5)
BASOPHILS # BLD AUTO: 0.05 K/UL — SIGNIFICANT CHANGE UP (ref 0–0.2)
BASOPHILS NFR BLD AUTO: 1.1 % — SIGNIFICANT CHANGE UP (ref 0–2)
EOSINOPHIL # BLD AUTO: 0.08 K/UL — SIGNIFICANT CHANGE UP (ref 0–0.5)
EOSINOPHIL NFR BLD AUTO: 1.8 % — SIGNIFICANT CHANGE UP (ref 0–6)
HCT VFR BLD CALC: 33.9 % — LOW (ref 34.5–45)
HGB BLD-MCNC: 11.6 G/DL — SIGNIFICANT CHANGE UP (ref 11.5–15.5)
IMM GRANULOCYTES NFR BLD AUTO: 0.9 % — SIGNIFICANT CHANGE UP (ref 0–0.9)
INR BLD: 0.98 RATIO — SIGNIFICANT CHANGE UP (ref 0.88–1.16)
LYMPHOCYTES # BLD AUTO: 1.35 K/UL — SIGNIFICANT CHANGE UP (ref 1–3.3)
LYMPHOCYTES # BLD AUTO: 30.6 % — SIGNIFICANT CHANGE UP (ref 13–44)
MCHC RBC-ENTMCNC: 27.4 PG — SIGNIFICANT CHANGE UP (ref 27–34)
MCHC RBC-ENTMCNC: 34.2 G/DL — SIGNIFICANT CHANGE UP (ref 32–36)
MCV RBC AUTO: 80 FL — SIGNIFICANT CHANGE UP (ref 80–100)
MONOCYTES # BLD AUTO: 0.68 K/UL — SIGNIFICANT CHANGE UP (ref 0–0.9)
MONOCYTES NFR BLD AUTO: 15.4 % — HIGH (ref 2–14)
NEUTROPHILS # BLD AUTO: 2.21 K/UL — SIGNIFICANT CHANGE UP (ref 1.8–7.4)
NEUTROPHILS NFR BLD AUTO: 50.2 % — SIGNIFICANT CHANGE UP (ref 43–77)
NRBC # BLD: 0 /100 WBCS — SIGNIFICANT CHANGE UP (ref 0–0)
PLATELET # BLD AUTO: 236 K/UL — SIGNIFICANT CHANGE UP (ref 150–400)
PROTHROM AB SERPL-ACNC: 11.5 SEC — SIGNIFICANT CHANGE UP (ref 10.5–13.4)
RBC # BLD: 4.24 M/UL — SIGNIFICANT CHANGE UP (ref 3.8–5.2)
RBC # FLD: 14.9 % — HIGH (ref 10.3–14.5)
T4 FREE+ TSH PNL SERPL: 2.51 UIU/ML — SIGNIFICANT CHANGE UP (ref 0.27–4.2)
WBC # BLD: 4.41 K/UL — SIGNIFICANT CHANGE UP (ref 3.8–10.5)
WBC # FLD AUTO: 4.41 K/UL — SIGNIFICANT CHANGE UP (ref 3.8–10.5)

## 2022-12-08 ENCOUNTER — APPOINTMENT (OUTPATIENT)
Dept: SURGICAL ONCOLOGY | Facility: CLINIC | Age: 52
End: 2022-12-08

## 2022-12-08 VITALS
BODY MASS INDEX: 27.42 KG/M2 | SYSTOLIC BLOOD PRESSURE: 128 MMHG | WEIGHT: 149 LBS | HEIGHT: 62 IN | OXYGEN SATURATION: 99 % | RESPIRATION RATE: 16 BRPM | DIASTOLIC BLOOD PRESSURE: 83 MMHG | HEART RATE: 85 BPM

## 2022-12-08 LAB
APPEARANCE UR: CLEAR — SIGNIFICANT CHANGE UP
BILIRUB UR-MCNC: NEGATIVE — SIGNIFICANT CHANGE UP
COLOR SPEC: COLORLESS — SIGNIFICANT CHANGE UP
DIFF PNL FLD: NEGATIVE — SIGNIFICANT CHANGE UP
GLUCOSE UR QL: NEGATIVE — SIGNIFICANT CHANGE UP
KETONES UR-MCNC: NEGATIVE — SIGNIFICANT CHANGE UP
LEUKOCYTE ESTERASE UR-ACNC: NEGATIVE — SIGNIFICANT CHANGE UP
NITRITE UR-MCNC: NEGATIVE — SIGNIFICANT CHANGE UP
PH UR: 8 — SIGNIFICANT CHANGE UP (ref 5–8)
PROT UR-MCNC: NEGATIVE — SIGNIFICANT CHANGE UP
SP GR SPEC: 1.01 — SIGNIFICANT CHANGE UP (ref 1.01–1.02)
UROBILINOGEN FLD QL: SIGNIFICANT CHANGE UP

## 2022-12-08 PROCEDURE — 99214 OFFICE O/P EST MOD 30 MIN: CPT

## 2022-12-08 NOTE — PHYSICAL EXAM
[Normal] : supple, no neck mass and thyroid not enlarged [Normal Supraclavicular Lymph Nodes] : normal supraclavicular lymph nodes [Normal] : oriented to person, place and time, with appropriate affect [de-identified] : No masses bilaterally [de-identified] : Shoddy mobile right axillary adenopathy

## 2022-12-08 NOTE — ASSESSMENT
[FreeTextEntry1] : IMP:\par 53yo F w/ newly diagnosed uterine & breast cancer. \par \par B/L mammo 7/26/22 ([facility]) with benign findings to left breast (BIRADS 2) and right breast shows a suspicious but probably benign lesion (BIRADS 3).\par \par 8/10/22: cervical biopsy (path shows high grade squamous intraepithelial lesion to cervix, fragments of SCC w/ extensive tumor necrosis to endocervix)\par \par PET/CT 8/18/22 shows large area of intense activity w/in the uterine cavity corresponding to known uterine malignancy, nonspecific focal FDG activity in vaginal region, concerning for disease involvement. FDG avid retroperitoneal & B/L pelvic lymphadenopathy, c/w metastases from uterus. FDG avid right breast nodule likely malignancy. FDG avid right retropectoral, right axillary and right internal mammary nodes, suspicious for metastasis from breast malignancy. Mildly FDG avid prevascular and left retropectoral lymph nodes are indeterminate. Nonspecific approx symmetric FDG avidity in b/l palatine tonsils, probably inflammatory. Nonspecific generalized bone marrow hypermetabolism w/o CT correlate, probably reactive and may be related to anemia.\par \par right breast diagnostic mammo/ bilateral sono 8/19/2022. 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 ER+/ME+/HER2- \par \par CT chest/abd/pelvis 11/15/22: resolved previously seen lymhadenopathy. No evidence for residual right breast mass however dedicated right breast imaging or PET/CT would be more sensitive. Decreased overall size of the cervical mass which would be best assessed by pelvic MRI. \par \par PLAN:\par B/L mammo/sono 1/2023\par RTO after imaging to discuss surgery and management of cervical cancer\par \par

## 2022-12-08 NOTE — HISTORY OF PRESENT ILLNESS
[de-identified] : Ms. MARISOL OROSCO is a 52 year old woman, primarily Samoan speaking, who presents today for an follow up breast cancer.\par Accompanied by her daughter, Tammy, who the patient prefers to translate for her. \par \par B/L mammo 7/26/22 ([facility]) with benign findings to left breast (BIRADS 2) and right breast shows a suspicious but probably benign lesion (BIRADS 3).\par \par She returned to [place] from the [facility] to establish care after having a biopsy in [facility] that was c/w cervical cancer. She established care with Dr. Radha Holm who repeated a cervical biopsy (path shows high grade squamous intraepithelial lesion to cervix, fragments of SCC w/ extensive tumor necrosis to endocervix) Dr. Holm also ordered PET/CT & further breast imaging \par \par PET/CT 8/18/22 shows large area of intense activity w/in the uterine cavity corresponding to known uterine malignancy, nonspecific focal FDG activity in vaginal region, concerning for disease involvement. FDG avid retroperitoneal & B/L pelvic lymphadenopathy, c/w metastases from uterus. FDG avid right breast nodule likely malignancy. FDG avid right retropectoral, right axillary and right internal mammary nodes, suspicious for metastasis from breast malignancy. Mildly FDG avid prevascular and left retropectoral lymph nodes are indeterminate. Nonspecific approx symmetric FDG avidity in b/l palatine tonsils, probably inflammatory. Nonspecific generalized bone marrow hypermetabolism w/o CT correlate, probably reactive and may be related to anemia.\par \par right breast diagnostic mammo/ bilateral sono to follow-up on [facility] abnormal imaging, done on 8/19/2022. \par US showed suspicious mass to right breast 10:00 4 cm fn, US biopsy recommended, highly suspicious for malignancy, BI-RADS 5.\par \par 8/23/22, right breast US biopsy, 10:00 4 cm fn, path: moderately to poorly differentiated invasive ductal carcinoma with mucinous and micropapillary features, 1.4 cm, intermediate grade DCIS, cribriform pattern, extensive lymphovascular invasion present, microcalcifications present in malignant and benign epithelium. ER/OR/HER2 receptors pending \par \par CT chest/abd/pelvis 11/15/22: resolved previously seen lymhadenopathy. No evidence for residual right breast mass however dedicated right breast imaging or PET/CT would be more sensitive. Decreased overall size of the cervical mass which would be best assessed by pelvic MRI. \par \par \par Family history of father with stomach cancer. Today she denies palpable breast masses, nipple discharge, skin changes, inversion or breast pain. \par

## 2022-12-09 ENCOUNTER — APPOINTMENT (OUTPATIENT)
Dept: ENDOVASCULAR SURGERY | Facility: CLINIC | Age: 52
End: 2022-12-09

## 2022-12-09 ENCOUNTER — NON-APPOINTMENT (OUTPATIENT)
Age: 52
End: 2022-12-09

## 2022-12-09 VITALS
HEART RATE: 91 BPM | WEIGHT: 150 LBS | TEMPERATURE: 97.6 F | BODY MASS INDEX: 27.6 KG/M2 | HEIGHT: 62 IN | OXYGEN SATURATION: 99 % | SYSTOLIC BLOOD PRESSURE: 108 MMHG | DIASTOLIC BLOOD PRESSURE: 76 MMHG | RESPIRATION RATE: 18 BRPM

## 2022-12-09 PROCEDURE — 36590 REMOVAL TUNNELED CV CATH: CPT

## 2022-12-09 PROCEDURE — 99203 OFFICE O/P NEW LOW 30 MIN: CPT | Mod: 25

## 2022-12-09 NOTE — PROCEDURE
[D/C IV on discharge] : D/C IV on discharge [Resume diet] : resume diet [Site check for bleeding/hematoma] : Site check for bleeding/hematoma [Vital signs on admission the q 15 mins x2] : Vital signs on admission the q 15 mins x2 [FreeTextEntry1] : The left chest wall was prepped and draped in usual sterile fashion.  1% lidocaine with epinephrine was infiltrated.  The previous skin incision was extended.  A sharp instrument was used to grab the Mediport and the Mediport was removed.  Pressure was held.  The wound was packed with a normal saline wet-to-dry.  A dry sterile dressing was placed over the packing.

## 2022-12-09 NOTE — ASSESSMENT
[Other: _____] : [unfilled] [FreeTextEntry1] : Patient with an exposed left-sided Mediport.  Mediport to be removed

## 2022-12-09 NOTE — PHYSICAL EXAM
[JVD] : no jugular venous distention  [Normal Heart Sounds] : normal heart sounds [Ankle Swelling (On Exam)] : not present [Varicose Veins Of Lower Extremities] : not present [] : not present [Abdomen Tenderness] : ~T ~M No abdominal tenderness [No Rash or Lesion] : No rash or lesion [Alert] : alert [Calm] : calm [de-identified] : Appears well

## 2022-12-09 NOTE — PHYSICAL EXAM
[JVD] : no jugular venous distention  [Normal Heart Sounds] : normal heart sounds [Ankle Swelling (On Exam)] : not present [Varicose Veins Of Lower Extremities] : not present [] : not present [Abdomen Tenderness] : ~T ~M No abdominal tenderness [No Rash or Lesion] : No rash or lesion [Alert] : alert [Calm] : calm [de-identified] : Appears well

## 2022-12-09 NOTE — HISTORY OF PRESENT ILLNESS
[FreeTextEntry5] : yesterday  [FreeTextEntry6] :   [FreeTextEntry1] : 52-year-old female who presents to the office with an infected left sided Mediport.  Patient is currently on treatment for breast and cervical cancer.  Patient had placement of a Mediport September 2022 and has been receiving chemotherapy.  Recently it was noted the skin over the Mediport was eroded.  She now presents for removal.

## 2022-12-09 NOTE — REASON FOR VISIT
[Other ___] : a [unfilled] visit for [FreeTextEntry2] : mediport removal/breast cancer - has dehiscence of wound with mediport exposure  [Consultation] : a consultation visit [FreeTextEntry1] : Infected Mediport

## 2022-12-09 NOTE — PAST MEDICAL HISTORY
[Increasing age ( >40 years old)] : Increasing age ( >40 years old) [No therapy indicated for cases scheduled for less than one hour] : No therapy indicated for cases scheduled for less than one hour. [Malignancy] : Malignancy [FreeTextEntry1] : Malignant Hyperthermia Screening Tool and Risk of Bleeding Assessment \par \par MARISOL OROSCO  denies family of unexpected death following Anesthesia or Exercise.\par Denies family history of Malignant Hyperthermia, Muscle or Neuromuscular disorder and High Temperature following exercise. \par \par MARISOL OROSCO Patient denies history of Muscle Spasm, Dark or Chocolate- Colored and unanticipated fever immediately following anesthesia or serious exercise. \par \par MARISOL OROSCO Patient also denies bleeding tendencies/risks of bleeding.

## 2022-12-12 ENCOUNTER — NON-APPOINTMENT (OUTPATIENT)
Age: 52
End: 2022-12-12

## 2022-12-12 ENCOUNTER — APPOINTMENT (OUTPATIENT)
Dept: ENDOVASCULAR SURGERY | Facility: CLINIC | Age: 52
End: 2022-12-12

## 2022-12-14 ENCOUNTER — RESULT REVIEW (OUTPATIENT)
Age: 52
End: 2022-12-14

## 2022-12-14 ENCOUNTER — NON-APPOINTMENT (OUTPATIENT)
Age: 52
End: 2022-12-14

## 2022-12-14 ENCOUNTER — APPOINTMENT (OUTPATIENT)
Dept: INFUSION THERAPY | Facility: HOSPITAL | Age: 52
End: 2022-12-14

## 2022-12-14 ENCOUNTER — APPOINTMENT (OUTPATIENT)
Dept: HEMATOLOGY ONCOLOGY | Facility: CLINIC | Age: 52
End: 2022-12-14

## 2022-12-14 LAB
APPEARANCE UR: CLEAR — SIGNIFICANT CHANGE UP
BASOPHILS # BLD AUTO: 0.04 K/UL — SIGNIFICANT CHANGE UP (ref 0–0.2)
BASOPHILS NFR BLD AUTO: 0.8 % — SIGNIFICANT CHANGE UP (ref 0–2)
BILIRUB UR-MCNC: NEGATIVE — SIGNIFICANT CHANGE UP
COLOR SPEC: COLORLESS — SIGNIFICANT CHANGE UP
DIFF PNL FLD: NEGATIVE — SIGNIFICANT CHANGE UP
EOSINOPHIL # BLD AUTO: 0.13 K/UL — SIGNIFICANT CHANGE UP (ref 0–0.5)
EOSINOPHIL NFR BLD AUTO: 2.7 % — SIGNIFICANT CHANGE UP (ref 0–6)
GLUCOSE UR QL: NEGATIVE — SIGNIFICANT CHANGE UP
HCT VFR BLD CALC: 36.7 % — SIGNIFICANT CHANGE UP (ref 34.5–45)
HGB BLD-MCNC: 12.4 G/DL — SIGNIFICANT CHANGE UP (ref 11.5–15.5)
IMM GRANULOCYTES NFR BLD AUTO: 0.6 % — SIGNIFICANT CHANGE UP (ref 0–0.9)
KETONES UR-MCNC: NEGATIVE — SIGNIFICANT CHANGE UP
LEUKOCYTE ESTERASE UR-ACNC: NEGATIVE — SIGNIFICANT CHANGE UP
LYMPHOCYTES # BLD AUTO: 1.72 K/UL — SIGNIFICANT CHANGE UP (ref 1–3.3)
LYMPHOCYTES # BLD AUTO: 35.1 % — SIGNIFICANT CHANGE UP (ref 13–44)
MCHC RBC-ENTMCNC: 27.1 PG — SIGNIFICANT CHANGE UP (ref 27–34)
MCHC RBC-ENTMCNC: 33.8 G/DL — SIGNIFICANT CHANGE UP (ref 32–36)
MCV RBC AUTO: 80.3 FL — SIGNIFICANT CHANGE UP (ref 80–100)
MONOCYTES # BLD AUTO: 0.59 K/UL — SIGNIFICANT CHANGE UP (ref 0–0.9)
MONOCYTES NFR BLD AUTO: 12 % — SIGNIFICANT CHANGE UP (ref 2–14)
NEUTROPHILS # BLD AUTO: 2.39 K/UL — SIGNIFICANT CHANGE UP (ref 1.8–7.4)
NEUTROPHILS NFR BLD AUTO: 48.8 % — SIGNIFICANT CHANGE UP (ref 43–77)
NITRITE UR-MCNC: NEGATIVE — SIGNIFICANT CHANGE UP
NRBC # BLD: 0 /100 WBCS — SIGNIFICANT CHANGE UP (ref 0–0)
PH UR: 7.5 — SIGNIFICANT CHANGE UP (ref 5–8)
PLATELET # BLD AUTO: 216 K/UL — SIGNIFICANT CHANGE UP (ref 150–400)
PROT UR-MCNC: NEGATIVE — SIGNIFICANT CHANGE UP
RBC # BLD: 4.57 M/UL — SIGNIFICANT CHANGE UP (ref 3.8–5.2)
RBC # FLD: 15 % — HIGH (ref 10.3–14.5)
SP GR SPEC: 1.01 — SIGNIFICANT CHANGE UP (ref 1.01–1.02)
UROBILINOGEN FLD QL: SIGNIFICANT CHANGE UP
WBC # BLD: 4.9 K/UL — SIGNIFICANT CHANGE UP (ref 3.8–10.5)
WBC # FLD AUTO: 4.9 K/UL — SIGNIFICANT CHANGE UP (ref 3.8–10.5)

## 2022-12-15 ENCOUNTER — OUTPATIENT (OUTPATIENT)
Dept: OUTPATIENT SERVICES | Facility: HOSPITAL | Age: 52
LOS: 1 days | Discharge: HOME | End: 2022-12-15

## 2022-12-15 DIAGNOSIS — C53.9 MALIGNANT NEOPLASM OF CERVIX UTERI, UNSPECIFIED: ICD-10-CM

## 2022-12-21 ENCOUNTER — RESULT REVIEW (OUTPATIENT)
Age: 52
End: 2022-12-21

## 2022-12-21 ENCOUNTER — APPOINTMENT (OUTPATIENT)
Dept: HEMATOLOGY ONCOLOGY | Facility: CLINIC | Age: 52
End: 2022-12-21

## 2022-12-21 VITALS
WEIGHT: 150.11 LBS | SYSTOLIC BLOOD PRESSURE: 111 MMHG | HEART RATE: 77 BPM | OXYGEN SATURATION: 98 % | DIASTOLIC BLOOD PRESSURE: 79 MMHG | RESPIRATION RATE: 16 BRPM | HEIGHT: 61.97 IN | BODY MASS INDEX: 27.62 KG/M2 | TEMPERATURE: 97.9 F

## 2022-12-21 LAB
ANISOCYTOSIS BLD QL: SLIGHT — SIGNIFICANT CHANGE UP
BASOPHILS # BLD AUTO: 0 K/UL — SIGNIFICANT CHANGE UP (ref 0–0.2)
BASOPHILS NFR BLD AUTO: 0 % — SIGNIFICANT CHANGE UP (ref 0–2)
ELLIPTOCYTES BLD QL SMEAR: SLIGHT — SIGNIFICANT CHANGE UP
EOSINOPHIL # BLD AUTO: 0.11 K/UL — SIGNIFICANT CHANGE UP (ref 0–0.5)
EOSINOPHIL NFR BLD AUTO: 5 % — SIGNIFICANT CHANGE UP (ref 0–6)
HCT VFR BLD CALC: 31.9 % — LOW (ref 34.5–45)
HGB BLD-MCNC: 10.7 G/DL — LOW (ref 11.5–15.5)
LYMPHOCYTES # BLD AUTO: 1.15 K/UL — SIGNIFICANT CHANGE UP (ref 1–3.3)
LYMPHOCYTES # BLD AUTO: 53 % — HIGH (ref 13–44)
MCHC RBC-ENTMCNC: 27.4 PG — SIGNIFICANT CHANGE UP (ref 27–34)
MCHC RBC-ENTMCNC: 33.5 G/DL — SIGNIFICANT CHANGE UP (ref 32–36)
MCV RBC AUTO: 81.8 FL — SIGNIFICANT CHANGE UP (ref 80–100)
MONOCYTES # BLD AUTO: 0.15 K/UL — SIGNIFICANT CHANGE UP (ref 0–0.9)
MONOCYTES NFR BLD AUTO: 7 % — SIGNIFICANT CHANGE UP (ref 2–14)
NEUTROPHILS # BLD AUTO: 0.76 K/UL — LOW (ref 1.8–7.4)
NEUTROPHILS NFR BLD AUTO: 35 % — LOW (ref 43–77)
NRBC # BLD: 0 /100 — SIGNIFICANT CHANGE UP (ref 0–0)
NRBC # BLD: SIGNIFICANT CHANGE UP /100 WBCS (ref 0–0)
PLAT MORPH BLD: NORMAL — SIGNIFICANT CHANGE UP
PLATELET # BLD AUTO: 166 K/UL — SIGNIFICANT CHANGE UP (ref 150–400)
POIKILOCYTOSIS BLD QL AUTO: SLIGHT — SIGNIFICANT CHANGE UP
RBC # BLD: 3.9 M/UL — SIGNIFICANT CHANGE UP (ref 3.8–5.2)
RBC # FLD: 15.1 % — HIGH (ref 10.3–14.5)
RBC BLD AUTO: SIGNIFICANT CHANGE UP
WBC # BLD: 2.17 K/UL — LOW (ref 3.8–10.5)
WBC # FLD AUTO: 2.17 K/UL — LOW (ref 3.8–10.5)

## 2022-12-21 PROCEDURE — 99214 OFFICE O/P EST MOD 30 MIN: CPT

## 2022-12-22 LAB
ALBUMIN SERPL ELPH-MCNC: 4.5 G/DL
ALP BLD-CCNC: 62 U/L
ALT SERPL-CCNC: 31 U/L
ANION GAP SERPL CALC-SCNC: 11 MMOL/L
AST SERPL-CCNC: 27 U/L
BILIRUB SERPL-MCNC: 0.2 MG/DL
BUN SERPL-MCNC: 13 MG/DL
CALCIUM SERPL-MCNC: 9.8 MG/DL
CHLORIDE SERPL-SCNC: 99 MMOL/L
CO2 SERPL-SCNC: 24 MMOL/L
CREAT SERPL-MCNC: 0.84 MG/DL
EGFR: 84 ML/MIN/1.73M2
GLUCOSE SERPL-MCNC: 96 MG/DL
MAGNESIUM SERPL-MCNC: 2.2 MG/DL
POTASSIUM SERPL-SCNC: 5 MMOL/L
PROT SERPL-MCNC: 7.6 G/DL
SODIUM SERPL-SCNC: 134 MMOL/L
TSH SERPL-ACNC: 2.13 UIU/ML

## 2022-12-27 NOTE — HISTORY OF PRESENT ILLNESS
[Disease: _____________________] : Disease: [unfilled] [Treatment Protocol] : Treatment Protocol [de-identified] : 52 y.o female with newly diagnosed with cervical cancer in [facility].\par \par \par \par Ms. Sharma, 52 years old, accompanied by her daughter, Tammy and is referred by Dr. Charles, for HSIL (7/20/22 from [facility]) cervical mass, thickened endometrium noted on CT (from [facility]). 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 \par \par Pt is without symptoms; she has not seen a GYN in over 10 years and recalls never having an abnormal pap smear. She returned home to the [facility] to help her mother with medical problems and decided to have a GYN checkup at that time. Pap smear returned HSIL / +HPV. Pathology noted SCC moderately differentiated and CT scan identified cervical mass; thickened lining of the uterus; lymphadenopathy as well as a right breast mass and right axillary lymphadenopathy. \par \par She denies f/c/n/v/d/abnormal vaginal bleeding, changes to bowel or bladder habits. Denies unintentional weight loss or gain. Denies post-coital or intercycle vaginal bleeding; denies abdominal pain, distention, bloating, back pain or any other associated symptoms. \par \par Imaging:\par 7/22/20 ([facility], [facility])\par Breast: 1.6 x 1.7 cm tuberous, polycyclic contour, accumulating contrast medium, visualized at the borderline of the lower quadrants of the right breast, with no infiltration of the thoracic muscles. \par Right axillary region: 1.4 x 1.0 cm; 1.0 x 1.0 cm and 0.8 x 1.1 cm. lymphadenopathy. \par Liver: well defined even borders, the liver parenchyma is with signs of fat rearrangement, a hypdense inclusion 1.6 x 2.1 cm in size, is subcapsularly visualized Segment 8 of the liver. It lacunarly accumulates contrast medium, additionally accumulates contrast medium during the delayed phase. The intrahepatic and extrahepatic bile ducts are not dilated. \par Abdominal lymph nodes: paraaortic and interaortocaval lymph nodes are sized 10-12 mm, weakly accumulate contrast medium. \par No free fluid in the abdominal cavity. \par Uterus: 5.9 x 7.5 cm. \par EM: 15 mm with dense fluid visualized, mildly heterogeneous. \par Cervix: tissue lesion 56 x 42 mm invading the uterine isthmus and body. The parametria are infiltrated. \par Uterine appendages are normally positioned, a cystic inclusion of 3.3 x 3.5 cm is visualized in the projection of the left ovary. \par Right Ovary: 2.0 x 2.8 cm. \par Lymph Nodes:\par - right common iliac lymph node: 1.1 x 1.5 cm.\par - right pelvic wall node: 1.8 x 3.1 cm\par - left pelvic wall node: 2.4 x 3.0 cm. \par - right external iliac lymph node: 1.8 x 1.6 cm \par \par She was seen by Dr. 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 \par Menarche: 12\par Menopause: still menstruating. LMP 7/30/22 [FreeTextEntry1] : Cisplatin/ Taxol/ Myesha/ Pembro\par C1- 9/14/2022\par C2- 10/5/2022\par C3 - 10/26/22\par C4 - 11/16/22\par C5 - 12/14/22 [de-identified] : Patient is here for follow up after 5th cycle of chemo.\par She continues to tolerate treatment well.\par She had wound dehiscence of her mediport with infection, had port removed and now has pen wound that is being packed daily, healing well.\par Continues to follow up with Dr. Acosta for breast cancer.

## 2022-12-29 ENCOUNTER — APPOINTMENT (OUTPATIENT)
Dept: VASCULAR SURGERY | Facility: CLINIC | Age: 52
End: 2022-12-29

## 2022-12-29 VITALS
WEIGHT: 150 LBS | DIASTOLIC BLOOD PRESSURE: 81 MMHG | SYSTOLIC BLOOD PRESSURE: 120 MMHG | HEIGHT: 61 IN | BODY MASS INDEX: 28.32 KG/M2 | HEART RATE: 81 BPM

## 2022-12-29 PROCEDURE — 99213 OFFICE O/P EST LOW 20 MIN: CPT

## 2022-12-29 NOTE — PHYSICAL EXAM
[Normal Breath Sounds] : Normal breath sounds [Abdomen Tenderness] : ~T ~M No abdominal tenderness [de-identified] : Appears well [FreeTextEntry1] : Left Mediport site is granulating well

## 2022-12-29 NOTE — ASSESSMENT
[FreeTextEntry1] : No further need to pack the wound.\par Patient may cover with bacitracin and a dry sterile dressing.\par Patient to return to office if new Mediport is required.

## 2023-01-04 ENCOUNTER — APPOINTMENT (OUTPATIENT)
Dept: HEMATOLOGY ONCOLOGY | Facility: CLINIC | Age: 53
End: 2023-01-04

## 2023-01-04 ENCOUNTER — RESULT REVIEW (OUTPATIENT)
Age: 53
End: 2023-01-04

## 2023-01-04 ENCOUNTER — APPOINTMENT (OUTPATIENT)
Dept: INFUSION THERAPY | Facility: HOSPITAL | Age: 53
End: 2023-01-04

## 2023-01-04 DIAGNOSIS — R11.2 NAUSEA WITH VOMITING, UNSPECIFIED: ICD-10-CM

## 2023-01-04 DIAGNOSIS — Z51.11 ENCOUNTER FOR ANTINEOPLASTIC CHEMOTHERAPY: ICD-10-CM

## 2023-01-04 DIAGNOSIS — E86.0 DEHYDRATION: ICD-10-CM

## 2023-01-04 LAB
BASOPHILS # BLD AUTO: 0.04 K/UL — SIGNIFICANT CHANGE UP (ref 0–0.2)
BASOPHILS NFR BLD AUTO: 0.7 % — SIGNIFICANT CHANGE UP (ref 0–2)
EOSINOPHIL # BLD AUTO: 0.08 K/UL — SIGNIFICANT CHANGE UP (ref 0–0.5)
EOSINOPHIL NFR BLD AUTO: 1.5 % — SIGNIFICANT CHANGE UP (ref 0–6)
HCT VFR BLD CALC: 33.3 % — LOW (ref 34.5–45)
HGB BLD-MCNC: 11 G/DL — LOW (ref 11.5–15.5)
IMM GRANULOCYTES NFR BLD AUTO: 0.4 % — SIGNIFICANT CHANGE UP (ref 0–0.9)
LYMPHOCYTES # BLD AUTO: 1.74 K/UL — SIGNIFICANT CHANGE UP (ref 1–3.3)
LYMPHOCYTES # BLD AUTO: 31.8 % — SIGNIFICANT CHANGE UP (ref 13–44)
MCHC RBC-ENTMCNC: 27 PG — SIGNIFICANT CHANGE UP (ref 27–34)
MCHC RBC-ENTMCNC: 33 G/DL — SIGNIFICANT CHANGE UP (ref 32–36)
MCV RBC AUTO: 81.6 FL — SIGNIFICANT CHANGE UP (ref 80–100)
MONOCYTES # BLD AUTO: 0.76 K/UL — SIGNIFICANT CHANGE UP (ref 0–0.9)
MONOCYTES NFR BLD AUTO: 13.9 % — SIGNIFICANT CHANGE UP (ref 2–14)
NEUTROPHILS # BLD AUTO: 2.84 K/UL — SIGNIFICANT CHANGE UP (ref 1.8–7.4)
NEUTROPHILS NFR BLD AUTO: 51.7 % — SIGNIFICANT CHANGE UP (ref 43–77)
NRBC # BLD: 0 /100 WBCS — SIGNIFICANT CHANGE UP (ref 0–0)
PLATELET # BLD AUTO: 243 K/UL — SIGNIFICANT CHANGE UP (ref 150–400)
RBC # BLD: 4.08 M/UL — SIGNIFICANT CHANGE UP (ref 3.8–5.2)
RBC # FLD: 15.9 % — HIGH (ref 10.3–14.5)
WBC # BLD: 5.48 K/UL — SIGNIFICANT CHANGE UP (ref 3.8–10.5)
WBC # FLD AUTO: 5.48 K/UL — SIGNIFICANT CHANGE UP (ref 3.8–10.5)

## 2023-01-10 ENCOUNTER — APPOINTMENT (OUTPATIENT)
Dept: ULTRASOUND IMAGING | Facility: CLINIC | Age: 53
End: 2023-01-10
Payer: MEDICAID

## 2023-01-10 ENCOUNTER — APPOINTMENT (OUTPATIENT)
Dept: MAMMOGRAPHY | Facility: CLINIC | Age: 53
End: 2023-01-10
Payer: MEDICAID

## 2023-01-10 ENCOUNTER — RESULT REVIEW (OUTPATIENT)
Age: 53
End: 2023-01-10

## 2023-01-10 PROCEDURE — 77062 BREAST TOMOSYNTHESIS BI: CPT

## 2023-01-10 PROCEDURE — 77066 DX MAMMO INCL CAD BI: CPT

## 2023-01-10 PROCEDURE — 76641 ULTRASOUND BREAST COMPLETE: CPT | Mod: 50

## 2023-01-11 ENCOUNTER — RESULT REVIEW (OUTPATIENT)
Age: 53
End: 2023-01-11

## 2023-01-11 ENCOUNTER — APPOINTMENT (OUTPATIENT)
Dept: NUCLEAR MEDICINE | Facility: CLINIC | Age: 53
End: 2023-01-11
Payer: MEDICAID

## 2023-01-11 ENCOUNTER — OUTPATIENT (OUTPATIENT)
Dept: OUTPATIENT SERVICES | Facility: HOSPITAL | Age: 53
LOS: 1 days | End: 2023-01-11
Payer: MEDICAID

## 2023-01-11 DIAGNOSIS — C53.9 MALIGNANT NEOPLASM OF CERVIX UTERI, UNSPECIFIED: ICD-10-CM

## 2023-01-11 PROCEDURE — A9552: CPT

## 2023-01-11 PROCEDURE — 78815 PET IMAGE W/CT SKULL-THIGH: CPT

## 2023-01-11 PROCEDURE — 78815 PET IMAGE W/CT SKULL-THIGH: CPT | Mod: 26,PS

## 2023-01-16 ENCOUNTER — APPOINTMENT (OUTPATIENT)
Dept: SURGICAL ONCOLOGY | Facility: CLINIC | Age: 53
End: 2023-01-16
Payer: MEDICAID

## 2023-01-16 VITALS
DIASTOLIC BLOOD PRESSURE: 84 MMHG | WEIGHT: 150 LBS | SYSTOLIC BLOOD PRESSURE: 134 MMHG | RESPIRATION RATE: 16 BRPM | BODY MASS INDEX: 28.32 KG/M2 | HEART RATE: 75 BPM | HEIGHT: 61 IN | OXYGEN SATURATION: 99 %

## 2023-01-16 PROCEDURE — 99214 OFFICE O/P EST MOD 30 MIN: CPT

## 2023-01-16 NOTE — ASSESSMENT
[FreeTextEntry1] : IMP:\par 51yo F w/ metastatic cervical cancer & synchronous right breast IDC (ER+/KS+/HER2-)\par \par 8/26/22- right axillary LN metastatic adenocarcinoma c/w breast origin \par 8/26/22- left supraclavicular LN positive for metastatic SCC c/w stage IV cervical cancer\par \par CT chest/abd/pelvis 11/15/22: resolved previously seen lymphadenopathy. No evidence for residual right breast mass however dedicated right breast imaging or PET/CT would be more sensitive. Decreased overall size of the cervical mass which would be best assessed by pelvic MRI. \par \par on cisplatin/Taxol/Keytruda for cervical ca (completed 6 cycles as of 1/4/2023)  Avastin D/Alfredo r/t port dehiscence \par \par B/L mammo/sono 1/10/23 showed biopsy proven right breast (10:00) carcinoma, decreased in size & stable calcifications, indeterminate right breast mass to 2:00- U/S biopsy recommended, BI-RADS 4A\par \par PET/CT 1/11/23- Compared with PET scan 8/8/2022, there has been interval response to therapy. Residual low-level FDG avidity in lymph nodes in the pelvis is similar to blood pool. No new lesions. FDG-avidity in the soft tissues around the Mediport removal, query soft tissue infection.\par \par seeing Dr. Charles for F/U on 1/18/23 \par \par PLAN:\par Will discus cervical management with Dr. Charles and see if breast surgery should be performed or just hormone therapy \par \par

## 2023-01-16 NOTE — PHYSICAL EXAM
[Normal] : supple, no neck mass and thyroid not enlarged [Normal Supraclavicular Lymph Nodes] : normal supraclavicular lymph nodes [Normal] : oriented to person, place and time, with appropriate affect [Normal Axillary Lymph Nodes] : normal axillary lymph nodes [de-identified] : Shoddy mobile right axillary adenopathy [de-identified] : No masses bilaterally; left mediport removal site now healed

## 2023-01-16 NOTE — HISTORY OF PRESENT ILLNESS
[de-identified] : Ms. MARISOL OROSCO is a 52 year old woman, primarily Honduran speaking, who presents today for an follow up breast cancer.\par Accompanied by her daughter, Tammy, who the patient prefers to translate for her. \par \par B/L mammo 7/26/22 ([facility]) with benign findings to left breast (BIRADS 2) and right breast shows a suspicious but probably benign lesion (BIRADS 3).\par \par She returned to [place] from the [facility] to establish care after having a biopsy in [facility] that was c/w cervical cancer. She established care with Dr. Radha Holm who repeated a cervical biopsy (path shows high grade squamous intraepithelial lesion to cervix, fragments of SCC w/ extensive tumor necrosis to endocervix) Dr. Holm also ordered PET/CT & further breast imaging \par \par PET/CT 8/18/22 shows large area of intense activity w/in the uterine cavity corresponding to known uterine malignancy, nonspecific focal FDG activity in vaginal region, concerning for disease involvement. FDG avid retroperitoneal & B/L pelvic lymphadenopathy, c/w metastases from uterus. FDG avid right breast nodule likely malignancy. FDG avid right retropectoral, right axillary and right internal mammary nodes, suspicious for metastasis from breast malignancy. Mildly FDG avid prevascular and left retropectoral lymph nodes are indeterminate. Nonspecific approx symmetric FDG avidity in b/l palatine tonsils, probably inflammatory. Nonspecific generalized bone marrow hypermetabolism w/o CT correlate, probably reactive and may be related to anemia.\par \par right breast diagnostic mammo/ bilateral sono to follow-up on [facility] abnormal imaging, done on 8/19/2022. \par US showed suspicious mass to right breast 10:00 4 cm fn, US biopsy recommended, highly suspicious for malignancy, BI-RADS 5.\par \par 8/23/22, right breast US biopsy, 10:00 4 cm fn, path: moderately to poorly differentiated invasive ductal carcinoma with mucinous and micropapillary features, 1.4 cm, intermediate grade DCIS, cribriform pattern, extensive lymphovascular invasion present, microcalcifications present in malignant and benign epithelium. ER+/TX+/HER2 negative by FISH\par \par 8/26/22- right axillary LN metastatic adenocarcinoma c/w breast origin \par 8/26/22- left supraclavicular LN positive for metastatic SCC c/w stage IV cervical cancer\par \par CT chest/abd/pelvis 11/15/22: resolved previously seen lymphadenopathy. No evidence for residual right breast mass however dedicated right breast imaging or PET/CT would be more sensitive. Decreased overall size of the cervical mass which would be best assessed by pelvic MRI. \par \par Family history of father with stomach cancer. \par \par B/L mammo/sono 1/10/23 showed biopsy proven right breast (10:00) carcinoma, decreased in size & stable calcifications, indeterminate right breast mass to 2:00- U/S biopsy recommended, BI-RADS 4A\par \par PET/CT 1/11/23- Compared with PET scan 8/8/2022, there has been interval response to therapy. Residual low-level FDG avidity in lymph nodes in the pelvis is similar to blood pool. No new lesions. FDG-avidity in the soft tissues around the Mediport removal, query soft tissue infection.\par \par

## 2023-01-17 ENCOUNTER — APPOINTMENT (OUTPATIENT)
Dept: ULTRASOUND IMAGING | Facility: CLINIC | Age: 53
End: 2023-01-17

## 2023-01-18 ENCOUNTER — RESULT REVIEW (OUTPATIENT)
Age: 53
End: 2023-01-18

## 2023-01-18 ENCOUNTER — APPOINTMENT (OUTPATIENT)
Dept: HEMATOLOGY ONCOLOGY | Facility: CLINIC | Age: 53
End: 2023-01-18
Payer: MEDICAID

## 2023-01-18 VITALS
TEMPERATURE: 97.5 F | BODY MASS INDEX: 29.05 KG/M2 | OXYGEN SATURATION: 97 % | SYSTOLIC BLOOD PRESSURE: 136 MMHG | DIASTOLIC BLOOD PRESSURE: 83 MMHG | HEIGHT: 60.98 IN | WEIGHT: 153.88 LBS | RESPIRATION RATE: 16 BRPM | HEART RATE: 80 BPM

## 2023-01-18 LAB
BASOPHILS # BLD AUTO: 0 K/UL — SIGNIFICANT CHANGE UP (ref 0–0.2)
BASOPHILS NFR BLD AUTO: 0 % — SIGNIFICANT CHANGE UP (ref 0–2)
ELLIPTOCYTES BLD QL SMEAR: SLIGHT — SIGNIFICANT CHANGE UP
EOSINOPHIL # BLD AUTO: 0.04 K/UL — SIGNIFICANT CHANGE UP (ref 0–0.5)
EOSINOPHIL NFR BLD AUTO: 2 % — SIGNIFICANT CHANGE UP (ref 0–6)
HCT VFR BLD CALC: 32.5 % — LOW (ref 34.5–45)
HGB BLD-MCNC: 10.7 G/DL — LOW (ref 11.5–15.5)
LYMPHOCYTES # BLD AUTO: 1.12 K/UL — SIGNIFICANT CHANGE UP (ref 1–3.3)
LYMPHOCYTES # BLD AUTO: 51 % — HIGH (ref 13–44)
MCHC RBC-ENTMCNC: 27.2 PG — SIGNIFICANT CHANGE UP (ref 27–34)
MCHC RBC-ENTMCNC: 32.9 G/DL — SIGNIFICANT CHANGE UP (ref 32–36)
MCV RBC AUTO: 82.5 FL — SIGNIFICANT CHANGE UP (ref 80–100)
MONOCYTES # BLD AUTO: 0.44 K/UL — SIGNIFICANT CHANGE UP (ref 0–0.9)
MONOCYTES NFR BLD AUTO: 20 % — HIGH (ref 2–14)
NEUTROPHILS # BLD AUTO: 0.59 K/UL — LOW (ref 1.8–7.4)
NEUTROPHILS NFR BLD AUTO: 27 % — LOW (ref 43–77)
NRBC # BLD: 0 /100 — SIGNIFICANT CHANGE UP (ref 0–0)
NRBC # BLD: SIGNIFICANT CHANGE UP /100 WBCS (ref 0–0)
PLAT MORPH BLD: NORMAL — SIGNIFICANT CHANGE UP
PLATELET # BLD AUTO: 170 K/UL — SIGNIFICANT CHANGE UP (ref 150–400)
POIKILOCYTOSIS BLD QL AUTO: SLIGHT — SIGNIFICANT CHANGE UP
RBC # BLD: 3.94 M/UL — SIGNIFICANT CHANGE UP (ref 3.8–5.2)
RBC # FLD: 15.9 % — HIGH (ref 10.3–14.5)
RBC BLD AUTO: ABNORMAL
WBC # BLD: 2.2 K/UL — LOW (ref 3.8–10.5)
WBC # FLD AUTO: 2.2 K/UL — LOW (ref 3.8–10.5)

## 2023-01-18 PROCEDURE — 99213 OFFICE O/P EST LOW 20 MIN: CPT

## 2023-01-19 LAB
ALBUMIN SERPL ELPH-MCNC: 4.6 G/DL
ALP BLD-CCNC: 66 U/L
ALT SERPL-CCNC: 20 U/L
ANION GAP SERPL CALC-SCNC: 11 MMOL/L
AST SERPL-CCNC: 25 U/L
BILIRUB SERPL-MCNC: 0.2 MG/DL
BUN SERPL-MCNC: 9 MG/DL
CALCIUM SERPL-MCNC: 9.7 MG/DL
CHLORIDE SERPL-SCNC: 98 MMOL/L
CO2 SERPL-SCNC: 24 MMOL/L
CREAT SERPL-MCNC: 0.85 MG/DL
EGFR: 82 ML/MIN/1.73M2
GLUCOSE SERPL-MCNC: 92 MG/DL
MAGNESIUM SERPL-MCNC: 2 MG/DL
POTASSIUM SERPL-SCNC: 4.3 MMOL/L
PROT SERPL-MCNC: 7.8 G/DL
SODIUM SERPL-SCNC: 132 MMOL/L

## 2023-01-20 NOTE — HISTORY OF PRESENT ILLNESS
[Disease: _____________________] : Disease: [unfilled] [Treatment Protocol] : Treatment Protocol [de-identified] : 52 y.o female with newly diagnosed with cervical cancer in [facility].\par \par \par \par Ms. Sharma, 52 years old, accompanied by her daughter, Tammy and is referred by Dr. Charles, for HSIL (7/20/22 from [facility]) cervical mass, thickened endometrium noted on CT (from [facility]). 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 \par \par Pt is without symptoms; she has not seen a GYN in over 10 years and recalls never having an abnormal pap smear. She returned home to the [facility] to help her mother with medical problems and decided to have a GYN checkup at that time. Pap smear returned HSIL / +HPV. Pathology noted SCC moderately differentiated and CT scan identified cervical mass; thickened lining of the uterus; lymphadenopathy as well as a right breast mass and right axillary lymphadenopathy. \par \par She denies f/c/n/v/d/abnormal vaginal bleeding, changes to bowel or bladder habits. Denies unintentional weight loss or gain. Denies post-coital or intercycle vaginal bleeding; denies abdominal pain, distention, bloating, back pain or any other associated symptoms. \par \par Imaging:\par 7/22/20 ([facility], [facility])\par Breast: 1.6 x 1.7 cm tuberous, polycyclic contour, accumulating contrast medium, visualized at the borderline of the lower quadrants of the right breast, with no infiltration of the thoracic muscles. \par Right axillary region: 1.4 x 1.0 cm; 1.0 x 1.0 cm and 0.8 x 1.1 cm. lymphadenopathy. \par Liver: well defined even borders, the liver parenchyma is with signs of fat rearrangement, a hypdense inclusion 1.6 x 2.1 cm in size, is subcapsularly visualized Segment 8 of the liver. It lacunarly accumulates contrast medium, additionally accumulates contrast medium during the delayed phase. The intrahepatic and extrahepatic bile ducts are not dilated. \par Abdominal lymph nodes: paraaortic and interaortocaval lymph nodes are sized 10-12 mm, weakly accumulate contrast medium. \par No free fluid in the abdominal cavity. \par Uterus: 5.9 x 7.5 cm. \par EM: 15 mm with dense fluid visualized, mildly heterogeneous. \par Cervix: tissue lesion 56 x 42 mm invading the uterine isthmus and body. The parametria are infiltrated. \par Uterine appendages are normally positioned, a cystic inclusion of 3.3 x 3.5 cm is visualized in the projection of the left ovary. \par Right Ovary: 2.0 x 2.8 cm. \par Lymph Nodes:\par - right common iliac lymph node: 1.1 x 1.5 cm.\par - right pelvic wall node: 1.8 x 3.1 cm\par - left pelvic wall node: 2.4 x 3.0 cm. \par - right external iliac lymph node: 1.8 x 1.6 cm \par \par She was seen by Dr. 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 \par Menarche: 12\par Menopause: still menstruating. LMP 7/30/22 [FreeTextEntry1] : Cisplatin/ Taxol/ Myesha/ Pembro\par C1- 9/14/2022\par C2- 10/5/2022\par C3 - 10/26/22\par C4 - 11/16/22\par C5 - 12/14/22\par C6 - 1/4/23 [de-identified] : Patient is here for follow up after completing 6 cycles of treatment.\par She has tolerated treatment well overall.\par She has some increased neuropathy of feet and hands.

## 2023-01-27 ENCOUNTER — APPOINTMENT (OUTPATIENT)
Dept: HEMATOLOGY ONCOLOGY | Facility: CLINIC | Age: 53
End: 2023-01-27
Payer: MEDICAID

## 2023-01-27 VITALS
TEMPERATURE: 97 F | BODY MASS INDEX: 28.92 KG/M2 | OXYGEN SATURATION: 100 % | DIASTOLIC BLOOD PRESSURE: 82 MMHG | HEART RATE: 76 BPM | WEIGHT: 153 LBS | SYSTOLIC BLOOD PRESSURE: 123 MMHG | RESPIRATION RATE: 16 BRPM

## 2023-01-27 DIAGNOSIS — N64.59 OTHER SIGNS AND SYMPTOMS IN BREAST: ICD-10-CM

## 2023-01-27 PROCEDURE — 99214 OFFICE O/P EST MOD 30 MIN: CPT

## 2023-01-27 NOTE — PHYSICAL EXAM
[Fully active, able to carry on all pre-disease performance without restriction] : Status 0 - Fully active, able to carry on all pre-disease performance without restriction [Normal] : affect appropriate [de-identified] : Right breast ecchymosis from biopsy but no palpable mass; right axillary palpable 2 cm LN, mobile

## 2023-01-27 NOTE — HISTORY OF PRESENT ILLNESS
[Disease: _____________________] : Disease: [unfilled] [T: ___] : T[unfilled] [N: ___] : N[unfilled] [M: ___] : M[unfilled] [AJCC Stage: ____] : AJCC Stage: [unfilled] [de-identified] : Cervical cancer diagnosed at age 52 in 2022 while in [facility]\par Right breast cancer diagnosed simultaneously at age 52\par 7/2022 Pap smear revealed HSIL and +HPV.  Pathology noted SCCA, moderately differentiated\par 7/22/22 CT scan in [facility] identified cervical mass, 5.6 x 4.2 cm, invading the uterine isthmus and body. The parametria are infiltrated. Thickened uterine lining. Lymphadenopathy with right common iliac lymph node: 1.1 x 1.5 cm, right pelvic wall node: 1.8 x 3.1 cm, left pelvic wall node: 2.4 x 3.0 cm, right external iliac lymph node: 1.8 x 1.6 cm \par CT scan also reported a 1.6 x 1.7 cm tuberous mass at the borderline of the lower quadrant of the right breast, with no infiltration of the thoracic muscles.  Right axillary lymphadenopathy measuring 1.4 x 1.0 cm, 1.0 x 1.0 cm and 0.8 x 1.1 cm. \par 8/10/22 Cervical biopsy revealed high grade squamous intraepithelial lesion (HGSIL) (CIN3) with PD-L1 Tumor Proportion Score (TPS) 30%, positive\par 8/18/22 PET/CT scan showed a large area of intense activity within the uterine cavity extending into the uterine cervix with SUV 19.5. FDG avid retroperitoneal and bilateral pelvic lymphadenopathy (SUV 7.4 - 21.1). FDG avid right breast nodule (SUV 13.9) and right retropectoral (SUV 6.1), axillary (SUV 18.2) and internal mammary lymph nodes (SUV 5.8) suspicious for metastasis from breast. Mildly FDG avid prevascular and left retropectoral LN are indeterminate. Nonspecific FDG avidity in bilateral palatine tonsils, probably inflammatory. Nonspecific generalized BM hypermetabolism without CT correlate, probably reactive and may be related to anemia.\par 8/19/22 Mammogram and sonogram revealed heterogeneously dense breast parenchyma and an irregularly marginated mass in the upper outer quadrant  of the right breast with adjacent clusters of indeterminate calcifications. Sonogram revealed a 1.0 x 1.2 cm mass at 10:00. BI-RADS 5\par 8/23/22 Right breast biopsy revealed moderately to poorly differentiated IDC with extensive lymphovascular invasion, ER 95%, DE 95%, HER-2 2+ FISH negative\par 8/26/22 Right axillary LN revealed metastatic adenocarcinoma c/w breast origin. \par 8/26/22 Left supraclavicular LN positive for metastatic squamous cell carcinoma consistent with Stage IV cervical cancer\par 9/1/22 - 1/4/23 Chemotherapy for cervical cancer with 6 cycles of Cisplatin/Taxol/Bevacizumab/Pembrolizumab. Bevacizumab d/c after 3 cycles due to mediport dehiscence\par 1/11/23 PET/CT scan showed interval response to therapy with residual low level FDG avidity in lymph nodes in the pelvis similar to blood pool and no new lesions.\par Interval resolution of FDG-avid right breast lesion and retropectoral, right axillary, mediastinal, and right internal mammary lymph nodes. Low-level symmetrical FDG avidity in the christopher, nonspecific.  No FDG avid lymph nodes in the axilla above background.\par Interval decrease in size and FDG avidity of pelvic lymphadenopathy. Reference nodes:\par -Interval resolution of right internal iliac node; previously measuring approximately 1.1 cm, SUV 14.\par -Right external iliac mass, SUV 3.7 measuring 1.4 x 0.7 cm, image 220; previously 3 x 1.9 cm, SUV 21.\par -Right internal iliac node, no discernible FDG avidity, 0.8 x 0.6 cm, image 217; previously SUV 7.4, 1.6 x 1.5 cm.\par -Left pelvic sidewall mass, SUV 3.3, 1.7 x 0.6 cm, image 224; previously SUV 20, 3.4 x 2.2 cm (SUV 20.2; image 231).\par \par  [de-identified] : Right breast IDC with extensive lymphovascular invasion, axillary LN positive, ER 95%, [place] 95%, HER-2 2+ FISH negative [de-identified] : Shari comes with her daughter Tammy to discuss management of her breast cancer, following chemotherapy for her cervical cancer.\par She completed C6 of chemotherapy for cervical cancer on 1/4/23 with Cisplatin/Taxol/Bevacizumab/Pembrolizumab. Myesha d/c after 3 cycles due to mediport dehiscence. PET scan showed a good response to treatment and she is now going to start maintenance pembrolizumab\par \par HEALTH MAINTENANCE:\par PCP: Dr. Heather Kumari\par Mammogram and breast ultrasound: 8/19/22\par Pap Smear: 7/22\par Colonoscopy: none\par  No

## 2023-01-27 NOTE — REASON FOR VISIT
[Initial Consultation] : an initial consultation [Other: _____] : [unfilled] [Follow-Up Visit] : a follow-up [FreeTextEntry2] : Cervical cancer and Breast cancer.

## 2023-02-01 ENCOUNTER — RESULT REVIEW (OUTPATIENT)
Age: 53
End: 2023-02-01

## 2023-02-01 ENCOUNTER — NON-APPOINTMENT (OUTPATIENT)
Age: 53
End: 2023-02-01

## 2023-02-01 ENCOUNTER — APPOINTMENT (OUTPATIENT)
Dept: INFUSION THERAPY | Facility: HOSPITAL | Age: 53
End: 2023-02-01

## 2023-02-01 LAB
ALBUMIN SERPL ELPH-MCNC: 4.7 G/DL — SIGNIFICANT CHANGE UP (ref 3.3–5)
ALP SERPL-CCNC: 67 U/L — SIGNIFICANT CHANGE UP (ref 40–120)
ALT FLD-CCNC: 23 U/L — SIGNIFICANT CHANGE UP (ref 10–45)
ANION GAP SERPL CALC-SCNC: 14 MMOL/L — SIGNIFICANT CHANGE UP (ref 5–17)
AST SERPL-CCNC: 27 U/L — SIGNIFICANT CHANGE UP (ref 10–40)
BILIRUB SERPL-MCNC: 0.2 MG/DL — SIGNIFICANT CHANGE UP (ref 0.2–1.2)
BUN SERPL-MCNC: 18 MG/DL — SIGNIFICANT CHANGE UP (ref 7–23)
CALCIUM SERPL-MCNC: 10.2 MG/DL — SIGNIFICANT CHANGE UP (ref 8.4–10.5)
CANCER AG125 SERPL-ACNC: 14 U/ML — SIGNIFICANT CHANGE UP
CHLORIDE SERPL-SCNC: 99 MMOL/L — SIGNIFICANT CHANGE UP (ref 96–108)
CO2 SERPL-SCNC: 21 MMOL/L — LOW (ref 22–31)
CREAT SERPL-MCNC: 0.74 MG/DL — SIGNIFICANT CHANGE UP (ref 0.5–1.3)
EGFR: 97 ML/MIN/1.73M2 — SIGNIFICANT CHANGE UP
GLUCOSE SERPL-MCNC: 97 MG/DL — SIGNIFICANT CHANGE UP (ref 70–99)
POTASSIUM SERPL-MCNC: 4.3 MMOL/L — SIGNIFICANT CHANGE UP (ref 3.5–5.3)
POTASSIUM SERPL-SCNC: 4.3 MMOL/L — SIGNIFICANT CHANGE UP (ref 3.5–5.3)
PROT SERPL-MCNC: 7.8 G/DL — SIGNIFICANT CHANGE UP (ref 6–8.3)
SODIUM SERPL-SCNC: 134 MMOL/L — LOW (ref 135–145)
TSH SERPL-MCNC: 2.7 UIU/ML — SIGNIFICANT CHANGE UP (ref 0.27–4.2)

## 2023-02-12 ENCOUNTER — OUTPATIENT (OUTPATIENT)
Dept: OUTPATIENT SERVICES | Facility: HOSPITAL | Age: 53
LOS: 1 days | Discharge: HOME | End: 2023-02-12

## 2023-02-12 DIAGNOSIS — C53.9 MALIGNANT NEOPLASM OF CERVIX UTERI, UNSPECIFIED: ICD-10-CM

## 2023-02-15 ENCOUNTER — APPOINTMENT (OUTPATIENT)
Dept: GYNECOLOGIC ONCOLOGY | Facility: CLINIC | Age: 53
End: 2023-02-15
Payer: MEDICAID

## 2023-02-15 VITALS
WEIGHT: 150 LBS | DIASTOLIC BLOOD PRESSURE: 86 MMHG | BODY MASS INDEX: 28.32 KG/M2 | SYSTOLIC BLOOD PRESSURE: 130 MMHG | HEART RATE: 76 BPM | HEIGHT: 60.98 IN

## 2023-02-15 PROCEDURE — 99214 OFFICE O/P EST MOD 30 MIN: CPT

## 2023-02-15 RX ORDER — PROCHLORPERAZINE MALEATE 10 MG/1
10 TABLET ORAL EVERY 6 HOURS
Qty: 20 | Refills: 6 | Status: DISCONTINUED | COMMUNITY
Start: 2022-09-08 | End: 2023-02-15

## 2023-02-15 RX ORDER — LIDOCAINE AND PRILOCAINE 25; 25 MG/G; MG/G
2.5-2.5 CREAM TOPICAL
Qty: 1 | Refills: 3 | Status: DISCONTINUED | COMMUNITY
Start: 2022-09-09 | End: 2023-02-15

## 2023-02-15 RX ORDER — ZOLPIDEM TARTRATE 10 MG/1
10 TABLET ORAL
Qty: 30 | Refills: 0 | Status: DISCONTINUED | COMMUNITY
Start: 2022-09-09 | End: 2023-02-15

## 2023-02-15 NOTE — PHYSICAL EXAM
[Chaperone Present] : A chaperone was present in the examining room during all aspects of the physical examination [Fully active, able to carry on all pre-disease performance without restriction] : Status 0 - Fully active, able to carry on all pre-disease performance without restriction [Normal] : Cervix: Normal, no lesions [de-identified] : no clear mass visualized, resolved, free parametria b/l [de-identified] : not palpable [de-identified] : smooth, free b/l [de-identified] : No culdesac nodules

## 2023-02-15 NOTE — HISTORY OF PRESENT ILLNESS
[FreeTextEntry1] : Med Onc/ Ref:  Dr. Rain Charles\par PCP:  Dr. Heather Kumari\par Breast Med Onc:  Dr. Kaye Perrin\par Breast Surgeon:  Dr. Acosta. \par \par Ms. Sharma, 52 years old, accompanied by her daughter, Tammy and is referred by Dr. Charles, presented with Stage IV cervical cancer 8/2022 and metastatic breast cancer. Patient completed Cis/Taxol/Avastin, Pembro, and now on Pembro maintenance. Continues on Tamoxifen and lupron. Overall feeling better, tolerating treatment. no bleeding.\par \par Labs:\par  = 14\par  =  35\par \par \par Imaging:\par PET/CT 1/11/23\par 1. Compared with PET scan 8/8/2022, there has been interval response to therapy. Residual low-level FDG avidity in lymph nodes in the pelvis is similar to blood pool. No new lesions.\par 2. FDG-avidity in the soft tissues around the Mediport removal, query soft tissue infection.\par \par CT C/A/P 11/14/22\par -Resolved previously seen lymphadenopathy\par -No CT evidence for residual right breast mass however dedicated right breast imaging or PET/CT would be more sensitive.\par -Decreased overall size of the cervical mass which would be best assessed by pelvic MRI\par \par Current Treatment:  Cisplatin/Taxol/Myesha/Pembro\par C1 = 9/14/22\par C2 = 10/5/22\par C3 = 10/26/22\par C4 - 11/16/22\par C5 - 12/14/22\par C6 - 1/4/23. \par \par Health Maintenance:\par BMI: 27\par Lifestyle:  sexually active.  \par COVID vaccine received:  yes\par Mammogram: 1/10/23 - BI-RADS 4A  -Suspicious Finding(s) -Low Suspicion for Malignancy\par These results and recommendations were explained to the patient and her daughter, who will also be mailed a written summary in layman's terms.\par Colonoscopy:none\par PAP: 7/20/2022:  +HPV 16; HSIL\par

## 2023-02-15 NOTE — DISCUSSION/SUMMARY
[FreeTextEntry1] : Patient is doing well, clinically stable cervical cancer, no evidence of active disease\par continue maintenance pembro\par f/u 3 months\par continue hormonal tx for breast CA\par patient is in good spirits, [facility]\par all questions answered

## 2023-02-22 ENCOUNTER — RESULT REVIEW (OUTPATIENT)
Age: 53
End: 2023-02-22

## 2023-02-22 ENCOUNTER — APPOINTMENT (OUTPATIENT)
Dept: HEMATOLOGY ONCOLOGY | Facility: CLINIC | Age: 53
End: 2023-02-22
Payer: MEDICAID

## 2023-02-22 ENCOUNTER — APPOINTMENT (OUTPATIENT)
Dept: INFUSION THERAPY | Facility: HOSPITAL | Age: 53
End: 2023-02-22

## 2023-02-22 VITALS
TEMPERATURE: 96.8 F | DIASTOLIC BLOOD PRESSURE: 84 MMHG | HEART RATE: 69 BPM | OXYGEN SATURATION: 98 % | RESPIRATION RATE: 15 BRPM | SYSTOLIC BLOOD PRESSURE: 135 MMHG | WEIGHT: 152.12 LBS | BODY MASS INDEX: 28.76 KG/M2

## 2023-02-22 DIAGNOSIS — Z51.11 ENCOUNTER FOR ANTINEOPLASTIC CHEMOTHERAPY: ICD-10-CM

## 2023-02-22 LAB
ALBUMIN SERPL ELPH-MCNC: 4.7 G/DL — SIGNIFICANT CHANGE UP (ref 3.3–5)
ALP SERPL-CCNC: 57 U/L — SIGNIFICANT CHANGE UP (ref 40–120)
ALT FLD-CCNC: 15 U/L — SIGNIFICANT CHANGE UP (ref 10–45)
ANION GAP SERPL CALC-SCNC: 13 MMOL/L — SIGNIFICANT CHANGE UP (ref 5–17)
AST SERPL-CCNC: 22 U/L — SIGNIFICANT CHANGE UP (ref 10–40)
BASOPHILS # BLD AUTO: 0.04 K/UL — SIGNIFICANT CHANGE UP (ref 0–0.2)
BASOPHILS # BLD AUTO: 0.05 K/UL — SIGNIFICANT CHANGE UP (ref 0–0.2)
BASOPHILS NFR BLD AUTO: 0.8 % — SIGNIFICANT CHANGE UP (ref 0–2)
BASOPHILS NFR BLD AUTO: 1.1 % — SIGNIFICANT CHANGE UP (ref 0–2)
BILIRUB SERPL-MCNC: 0.2 MG/DL — SIGNIFICANT CHANGE UP (ref 0.2–1.2)
BUN SERPL-MCNC: 20 MG/DL — SIGNIFICANT CHANGE UP (ref 7–23)
CALCIUM SERPL-MCNC: 9.9 MG/DL — SIGNIFICANT CHANGE UP (ref 8.4–10.5)
CANCER AG125 SERPL-ACNC: 14 U/ML — SIGNIFICANT CHANGE UP
CHLORIDE SERPL-SCNC: 104 MMOL/L — SIGNIFICANT CHANGE UP (ref 96–108)
CO2 SERPL-SCNC: 22 MMOL/L — SIGNIFICANT CHANGE UP (ref 22–31)
CREAT SERPL-MCNC: 0.8 MG/DL — SIGNIFICANT CHANGE UP (ref 0.5–1.3)
EGFR: 89 ML/MIN/1.73M2 — SIGNIFICANT CHANGE UP
EOSINOPHIL # BLD AUTO: 0.11 K/UL — SIGNIFICANT CHANGE UP (ref 0–0.5)
EOSINOPHIL # BLD AUTO: 0.14 K/UL — SIGNIFICANT CHANGE UP (ref 0–0.5)
EOSINOPHIL NFR BLD AUTO: 2.3 % — SIGNIFICANT CHANGE UP (ref 0–6)
EOSINOPHIL NFR BLD AUTO: 2.7 % — SIGNIFICANT CHANGE UP (ref 0–6)
GLUCOSE SERPL-MCNC: 82 MG/DL — SIGNIFICANT CHANGE UP (ref 70–99)
HCT VFR BLD CALC: 35.4 % — SIGNIFICANT CHANGE UP (ref 34.5–45)
HCT VFR BLD CALC: 35.5 % — SIGNIFICANT CHANGE UP (ref 34.5–45)
HGB BLD-MCNC: 11.7 G/DL — SIGNIFICANT CHANGE UP (ref 11.5–15.5)
HGB BLD-MCNC: 11.9 G/DL — SIGNIFICANT CHANGE UP (ref 11.5–15.5)
IMM GRANULOCYTES NFR BLD AUTO: 0.2 % — SIGNIFICANT CHANGE UP (ref 0–0.9)
IMM GRANULOCYTES NFR BLD AUTO: 0.4 % — SIGNIFICANT CHANGE UP (ref 0–0.9)
LYMPHOCYTES # BLD AUTO: 1.52 K/UL — SIGNIFICANT CHANGE UP (ref 1–3.3)
LYMPHOCYTES # BLD AUTO: 1.6 K/UL — SIGNIFICANT CHANGE UP (ref 1–3.3)
LYMPHOCYTES # BLD AUTO: 30.5 % — SIGNIFICANT CHANGE UP (ref 13–44)
LYMPHOCYTES # BLD AUTO: 32 % — SIGNIFICANT CHANGE UP (ref 13–44)
MCHC RBC-ENTMCNC: 27.8 PG — SIGNIFICANT CHANGE UP (ref 27–34)
MCHC RBC-ENTMCNC: 27.9 PG — SIGNIFICANT CHANGE UP (ref 27–34)
MCHC RBC-ENTMCNC: 33.1 G/DL — SIGNIFICANT CHANGE UP (ref 32–36)
MCHC RBC-ENTMCNC: 33.5 G/DL — SIGNIFICANT CHANGE UP (ref 32–36)
MCV RBC AUTO: 82.9 FL — SIGNIFICANT CHANGE UP (ref 80–100)
MCV RBC AUTO: 84.5 FL — SIGNIFICANT CHANGE UP (ref 80–100)
MONOCYTES # BLD AUTO: 0.46 K/UL — SIGNIFICANT CHANGE UP (ref 0–0.9)
MONOCYTES # BLD AUTO: 0.48 K/UL — SIGNIFICANT CHANGE UP (ref 0–0.9)
MONOCYTES NFR BLD AUTO: 9.1 % — SIGNIFICANT CHANGE UP (ref 2–14)
MONOCYTES NFR BLD AUTO: 9.7 % — SIGNIFICANT CHANGE UP (ref 2–14)
NEUTROPHILS # BLD AUTO: 2.6 K/UL — SIGNIFICANT CHANGE UP (ref 1.8–7.4)
NEUTROPHILS # BLD AUTO: 2.97 K/UL — SIGNIFICANT CHANGE UP (ref 1.8–7.4)
NEUTROPHILS NFR BLD AUTO: 54.7 % — SIGNIFICANT CHANGE UP (ref 43–77)
NEUTROPHILS NFR BLD AUTO: 56.5 % — SIGNIFICANT CHANGE UP (ref 43–77)
NRBC # BLD: 0 /100 WBCS — SIGNIFICANT CHANGE UP (ref 0–0)
NRBC # BLD: 0 /100 WBCS — SIGNIFICANT CHANGE UP (ref 0–0)
PLATELET # BLD AUTO: 223 K/UL — SIGNIFICANT CHANGE UP (ref 150–400)
PLATELET # BLD AUTO: 228 K/UL — SIGNIFICANT CHANGE UP (ref 150–400)
POTASSIUM SERPL-MCNC: 4.6 MMOL/L — SIGNIFICANT CHANGE UP (ref 3.5–5.3)
POTASSIUM SERPL-SCNC: 4.6 MMOL/L — SIGNIFICANT CHANGE UP (ref 3.5–5.3)
PROT SERPL-MCNC: 7.4 G/DL — SIGNIFICANT CHANGE UP (ref 6–8.3)
RBC # BLD: 4.19 M/UL — SIGNIFICANT CHANGE UP (ref 3.8–5.2)
RBC # BLD: 4.28 M/UL — SIGNIFICANT CHANGE UP (ref 3.8–5.2)
RBC # FLD: 15.4 % — HIGH (ref 10.3–14.5)
RBC # FLD: 15.5 % — HIGH (ref 10.3–14.5)
SODIUM SERPL-SCNC: 139 MMOL/L — SIGNIFICANT CHANGE UP (ref 135–145)
TSH SERPL-MCNC: 3.86 UIU/ML — SIGNIFICANT CHANGE UP (ref 0.27–4.2)
WBC # BLD: 4.75 K/UL — SIGNIFICANT CHANGE UP (ref 3.8–10.5)
WBC # BLD: 5.25 K/UL — SIGNIFICANT CHANGE UP (ref 3.8–10.5)
WBC # FLD AUTO: 4.75 K/UL — SIGNIFICANT CHANGE UP (ref 3.8–10.5)
WBC # FLD AUTO: 5.25 K/UL — SIGNIFICANT CHANGE UP (ref 3.8–10.5)

## 2023-02-22 PROCEDURE — 99214 OFFICE O/P EST MOD 30 MIN: CPT

## 2023-02-22 NOTE — HISTORY OF PRESENT ILLNESS
[Disease: _____________________] : Disease: [unfilled] [de-identified] : 52 y.o female with newly diagnosed with cervical cancer in [facility].\par \par \par \par Ms. Sharma, 52 years old, accompanied by her daughter, Tammy and is referred by Dr. Charles, for HSIL (7/20/22 from [facility]) cervical mass, thickened endometrium noted on CT (from [facility]). 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 \par \par Pt is without symptoms; she has not seen a GYN in over 10 years and recalls never having an abnormal pap smear. She returned home to the [facility] to help her mother with medical problems and decided to have a GYN checkup at that time. Pap smear returned HSIL / +HPV. Pathology noted SCC moderately differentiated and CT scan identified cervical mass; thickened lining of the uterus; lymphadenopathy as well as a right breast mass and right axillary lymphadenopathy. \par \par She denies f/c/n/v/d/abnormal vaginal bleeding, changes to bowel or bladder habits. Denies unintentional weight loss or gain. Denies post-coital or intercycle vaginal bleeding; denies abdominal pain, distention, bloating, back pain or any other associated symptoms. \par \par Imaging:\par 7/22/20 ([facility], [facility])\par Breast: 1.6 x 1.7 cm tuberous, polycyclic contour, accumulating contrast medium, visualized at the borderline of the lower quadrants of the right breast, with no infiltration of the thoracic muscles. \par Right axillary region: 1.4 x 1.0 cm; 1.0 x 1.0 cm and 0.8 x 1.1 cm. lymphadenopathy. \par Liver: well defined even borders, the liver parenchyma is with signs of fat rearrangement, a hypdense inclusion 1.6 x 2.1 cm in size, is subcapsularly visualized Segment 8 of the liver. It lacunarly accumulates contrast medium, additionally accumulates contrast medium during the delayed phase. The intrahepatic and extrahepatic bile ducts are not dilated. \par Abdominal lymph nodes: paraaortic and interaortocaval lymph nodes are sized 10-12 mm, weakly accumulate contrast medium. \par No free fluid in the abdominal cavity. \par Uterus: 5.9 x 7.5 cm. \par EM: 15 mm with dense fluid visualized, mildly heterogeneous. \par Cervix: tissue lesion 56 x 42 mm invading the uterine isthmus and body. The parametria are infiltrated. \par Uterine appendages are normally positioned, a cystic inclusion of 3.3 x 3.5 cm is visualized in the projection of the left ovary. \par Right Ovary: 2.0 x 2.8 cm. \par Lymph Nodes:\par - right common iliac lymph node: 1.1 x 1.5 cm.\par - right pelvic wall node: 1.8 x 3.1 cm\par - left pelvic wall node: 2.4 x 3.0 cm. \par - right external iliac lymph node: 1.8 x 1.6 cm \par \par She was seen by Dr. 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 \par Menarche: 12\par Menopause: still menstruating. LMP 7/30/22\par \par She completed C6 of chemotherapy for cervical cancer on 1/4/23 with Cisplatin/Taxol/Bevacizumab/Pembrolizumab. Myesha d/c after 3 cycles due to mediport dehiscence. PET scan showed a good response to treatment and she is now going to start maintenance pembrolizumab.\par \par Pet scan shows very good response in breast and lymph nodes from chemotherapy     (Cis/Taxol/Keytruda). Patient sees Dr. Perrin and Dr. Acosta for stage IIIC breast cancer.\par     Given Stage IV cervical cancer there is no indication for doing breast surgery at this time, as per the breast team. Patient started on Lupron and anastrozole. \par \par \par \par \par  [Treatment Protocol] : Treatment Protocol [FreeTextEntry1] : Pembro/ Lupron/ Anastrazole [de-identified] : Patient here for  a f/u visit . She feels well. She has some right elbow pain. It started three weeks ago.No bowel or bladder issues.\par Appetite is ok.

## 2023-03-15 ENCOUNTER — APPOINTMENT (OUTPATIENT)
Dept: INFUSION THERAPY | Facility: HOSPITAL | Age: 53
End: 2023-03-15

## 2023-03-15 ENCOUNTER — APPOINTMENT (OUTPATIENT)
Dept: HEMATOLOGY ONCOLOGY | Facility: CLINIC | Age: 53
End: 2023-03-15
Payer: MEDICAID

## 2023-03-15 ENCOUNTER — RESULT REVIEW (OUTPATIENT)
Age: 53
End: 2023-03-15

## 2023-03-15 LAB
ALBUMIN SERPL ELPH-MCNC: 4.3 G/DL — SIGNIFICANT CHANGE UP (ref 3.3–5)
ALP SERPL-CCNC: 65 U/L — SIGNIFICANT CHANGE UP (ref 40–120)
ALT FLD-CCNC: 15 U/L — SIGNIFICANT CHANGE UP (ref 10–45)
ANION GAP SERPL CALC-SCNC: 12 MMOL/L — SIGNIFICANT CHANGE UP (ref 5–17)
AST SERPL-CCNC: 22 U/L — SIGNIFICANT CHANGE UP (ref 10–40)
BASOPHILS # BLD AUTO: 0.04 K/UL — SIGNIFICANT CHANGE UP (ref 0–0.2)
BASOPHILS NFR BLD AUTO: 0.9 % — SIGNIFICANT CHANGE UP (ref 0–2)
BILIRUB SERPL-MCNC: 0.2 MG/DL — SIGNIFICANT CHANGE UP (ref 0.2–1.2)
BUN SERPL-MCNC: 15 MG/DL — SIGNIFICANT CHANGE UP (ref 7–23)
CALCIUM SERPL-MCNC: 10.2 MG/DL — SIGNIFICANT CHANGE UP (ref 8.4–10.5)
CANCER AG125 SERPL-ACNC: 15 U/ML — SIGNIFICANT CHANGE UP
CHLORIDE SERPL-SCNC: 101 MMOL/L — SIGNIFICANT CHANGE UP (ref 96–108)
CO2 SERPL-SCNC: 24 MMOL/L — SIGNIFICANT CHANGE UP (ref 22–31)
CREAT SERPL-MCNC: 0.86 MG/DL — SIGNIFICANT CHANGE UP (ref 0.5–1.3)
EGFR: 81 ML/MIN/1.73M2 — SIGNIFICANT CHANGE UP
EOSINOPHIL # BLD AUTO: 0.06 K/UL — SIGNIFICANT CHANGE UP (ref 0–0.5)
EOSINOPHIL NFR BLD AUTO: 1.4 % — SIGNIFICANT CHANGE UP (ref 0–6)
GLUCOSE SERPL-MCNC: 92 MG/DL — SIGNIFICANT CHANGE UP (ref 70–99)
HCT VFR BLD CALC: 35.9 % — SIGNIFICANT CHANGE UP (ref 34.5–45)
HGB BLD-MCNC: 11.7 G/DL — SIGNIFICANT CHANGE UP (ref 11.5–15.5)
IMM GRANULOCYTES NFR BLD AUTO: 0.2 % — SIGNIFICANT CHANGE UP (ref 0–0.9)
LYMPHOCYTES # BLD AUTO: 1.28 K/UL — SIGNIFICANT CHANGE UP (ref 1–3.3)
LYMPHOCYTES # BLD AUTO: 29.6 % — SIGNIFICANT CHANGE UP (ref 13–44)
MCHC RBC-ENTMCNC: 27.6 PG — SIGNIFICANT CHANGE UP (ref 27–34)
MCHC RBC-ENTMCNC: 32.6 G/DL — SIGNIFICANT CHANGE UP (ref 32–36)
MCV RBC AUTO: 84.7 FL — SIGNIFICANT CHANGE UP (ref 80–100)
MONOCYTES # BLD AUTO: 0.41 K/UL — SIGNIFICANT CHANGE UP (ref 0–0.9)
MONOCYTES NFR BLD AUTO: 9.5 % — SIGNIFICANT CHANGE UP (ref 2–14)
NEUTROPHILS # BLD AUTO: 2.52 K/UL — SIGNIFICANT CHANGE UP (ref 1.8–7.4)
NEUTROPHILS NFR BLD AUTO: 58.4 % — SIGNIFICANT CHANGE UP (ref 43–77)
NRBC # BLD: 0 /100 WBCS — SIGNIFICANT CHANGE UP (ref 0–0)
PLATELET # BLD AUTO: 215 K/UL — SIGNIFICANT CHANGE UP (ref 150–400)
POTASSIUM SERPL-MCNC: 4.3 MMOL/L — SIGNIFICANT CHANGE UP (ref 3.5–5.3)
POTASSIUM SERPL-SCNC: 4.3 MMOL/L — SIGNIFICANT CHANGE UP (ref 3.5–5.3)
PROT SERPL-MCNC: 7.4 G/DL — SIGNIFICANT CHANGE UP (ref 6–8.3)
RBC # BLD: 4.24 M/UL — SIGNIFICANT CHANGE UP (ref 3.8–5.2)
RBC # FLD: 14.6 % — HIGH (ref 10.3–14.5)
SODIUM SERPL-SCNC: 137 MMOL/L — SIGNIFICANT CHANGE UP (ref 135–145)
TSH SERPL-MCNC: 3.44 UIU/ML — SIGNIFICANT CHANGE UP (ref 0.27–4.2)
WBC # BLD: 4.32 K/UL — SIGNIFICANT CHANGE UP (ref 3.8–10.5)
WBC # FLD AUTO: 4.32 K/UL — SIGNIFICANT CHANGE UP (ref 3.8–10.5)

## 2023-03-15 PROCEDURE — 99213 OFFICE O/P EST LOW 20 MIN: CPT

## 2023-03-15 NOTE — REASON FOR VISIT
[Follow-Up Visit] : a follow-up [Pre-Treatment Visit] : a pre-treatment [Other: _____] : [unfilled] [FreeTextEntry2] : cervical cancer, breast cancer.

## 2023-03-15 NOTE — PHYSICAL EXAM
[Fully active, able to carry on all pre-disease performance without restriction] : Status 0 - Fully active, able to carry on all pre-disease performance without restriction [Normal] : affect appropriate [de-identified] : well healed skin to area of left chest previous mediport

## 2023-03-15 NOTE — HISTORY OF PRESENT ILLNESS
[Disease: _____________________] : Disease: [unfilled] [Treatment Protocol] : Treatment Protocol [de-identified] : 52 y.o female with newly diagnosed with cervical cancer in [facility].\par \par \par \par Ms. Sharma, 52 years old, accompanied by her daughter, Tammy and is referred by Dr. Charles, for HSIL (7/20/22 from [facility]) cervical mass, thickened endometrium noted on CT (from [facility]). 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 \par \par Pt is without symptoms; she has not seen a GYN in over 10 years and recalls never having an abnormal pap smear. She returned home to the [facility] to help her mother with medical problems and decided to have a GYN checkup at that time. Pap smear returned HSIL / +HPV. Pathology noted SCC moderately differentiated and CT scan identified cervical mass; thickened lining of the uterus; lymphadenopathy as well as a right breast mass and right axillary lymphadenopathy. \par \par She denies f/c/n/v/d/abnormal vaginal bleeding, changes to bowel or bladder habits. Denies unintentional weight loss or gain. Denies post-coital or intercycle vaginal bleeding; denies abdominal pain, distention, bloating, back pain or any other associated symptoms. \par \par Imaging:\par 7/22/20 ([facility], [facility])\par Breast: 1.6 x 1.7 cm tuberous, polycyclic contour, accumulating contrast medium, visualized at the borderline of the lower quadrants of the right breast, with no infiltration of the thoracic muscles. \par Right axillary region: 1.4 x 1.0 cm; 1.0 x 1.0 cm and 0.8 x 1.1 cm. lymphadenopathy. \par Liver: well defined even borders, the liver parenchyma is with signs of fat rearrangement, a hypdense inclusion 1.6 x 2.1 cm in size, is subcapsularly visualized Segment 8 of the liver. It lacunarly accumulates contrast medium, additionally accumulates contrast medium during the delayed phase. The intrahepatic and extrahepatic bile ducts are not dilated. \par Abdominal lymph nodes: paraaortic and interaortocaval lymph nodes are sized 10-12 mm, weakly accumulate contrast medium. \par No free fluid in the abdominal cavity. \par Uterus: 5.9 x 7.5 cm. \par EM: 15 mm with dense fluid visualized, mildly heterogeneous. \par Cervix: tissue lesion 56 x 42 mm invading the uterine isthmus and body. The parametria are infiltrated. \par Uterine appendages are normally positioned, a cystic inclusion of 3.3 x 3.5 cm is visualized in the projection of the left ovary. \par Right Ovary: 2.0 x 2.8 cm. \par Lymph Nodes:\par - right common iliac lymph node: 1.1 x 1.5 cm.\par - right pelvic wall node: 1.8 x 3.1 cm\par - left pelvic wall node: 2.4 x 3.0 cm. \par - right external iliac lymph node: 1.8 x 1.6 cm \par \par She was seen by Dr. 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 \par Menarche: 12\par Menopause: still menstruating. LMP 7/30/22\par \par She completed C6 of chemotherapy for cervical cancer on 1/4/23 with Cisplatin/Taxol/Bevacizumab/Pembrolizumab. Myesha d/c after 3 cycles due to mediport dehiscence. PET scan showed a good response to treatment and she is now going to start maintenance pembrolizumab.\par \par Pet scan shows very good response in breast and lymph nodes from chemotherapy     (Cis/Taxol/Keytruda). Patient sees Dr. Perrin and Dr. Acosta for stage IIIC breast cancer.\par     Given Stage IV cervical cancer there is no indication for doing breast surgery at this time, as per the breast team. Patient started on Lupron and anastrozole. \par \par \par \par \par  [FreeTextEntry1] : Pembro/ Lupron/ Anastrazole [de-identified] : Patient is here for follow up and treatment.\par She is feeling well, has recovered from chemotherapy.\par Arthralgias have resolved. \par She has occasional hot flashes.\par She has been taking Anastrazole daily.\par She denies chest pain, SOB, cough, abdominal pain, nausea, vomiting, diarrhea, constipation, bleeding, rash.

## 2023-03-20 ENCOUNTER — OUTPATIENT (OUTPATIENT)
Dept: OUTPATIENT SERVICES | Facility: HOSPITAL | Age: 53
LOS: 1 days | End: 2023-03-20
Payer: MEDICAID

## 2023-03-20 ENCOUNTER — APPOINTMENT (OUTPATIENT)
Dept: CT IMAGING | Facility: CLINIC | Age: 53
End: 2023-03-20
Payer: MEDICAID

## 2023-03-20 DIAGNOSIS — C53.9 MALIGNANT NEOPLASM OF CERVIX UTERI, UNSPECIFIED: ICD-10-CM

## 2023-03-20 PROCEDURE — 74177 CT ABD & PELVIS W/CONTRAST: CPT

## 2023-03-20 PROCEDURE — 71260 CT THORAX DX C+: CPT | Mod: 26

## 2023-03-20 PROCEDURE — 71260 CT THORAX DX C+: CPT

## 2023-03-20 PROCEDURE — 74177 CT ABD & PELVIS W/CONTRAST: CPT | Mod: 26

## 2023-04-05 ENCOUNTER — RESULT REVIEW (OUTPATIENT)
Age: 53
End: 2023-04-05

## 2023-04-05 ENCOUNTER — APPOINTMENT (OUTPATIENT)
Dept: HEMATOLOGY ONCOLOGY | Facility: CLINIC | Age: 53
End: 2023-04-05
Payer: MEDICAID

## 2023-04-05 ENCOUNTER — APPOINTMENT (OUTPATIENT)
Dept: INFUSION THERAPY | Facility: HOSPITAL | Age: 53
End: 2023-04-05

## 2023-04-05 VITALS
DIASTOLIC BLOOD PRESSURE: 82 MMHG | HEIGHT: 60.94 IN | HEART RATE: 78 BPM | TEMPERATURE: 97 F | BODY MASS INDEX: 29.22 KG/M2 | RESPIRATION RATE: 16 BRPM | WEIGHT: 154.74 LBS | SYSTOLIC BLOOD PRESSURE: 117 MMHG | OXYGEN SATURATION: 99 %

## 2023-04-05 LAB
ALBUMIN SERPL ELPH-MCNC: 4.8 G/DL — SIGNIFICANT CHANGE UP (ref 3.3–5)
ALP SERPL-CCNC: 74 U/L — SIGNIFICANT CHANGE UP (ref 40–120)
ALT FLD-CCNC: 24 U/L — SIGNIFICANT CHANGE UP (ref 10–45)
ANION GAP SERPL CALC-SCNC: 13 MMOL/L — SIGNIFICANT CHANGE UP (ref 5–17)
AST SERPL-CCNC: 28 U/L — SIGNIFICANT CHANGE UP (ref 10–40)
BASOPHILS # BLD AUTO: 0.03 K/UL — SIGNIFICANT CHANGE UP (ref 0–0.2)
BASOPHILS NFR BLD AUTO: 0.8 % — SIGNIFICANT CHANGE UP (ref 0–2)
BILIRUB SERPL-MCNC: 0.3 MG/DL — SIGNIFICANT CHANGE UP (ref 0.2–1.2)
BUN SERPL-MCNC: 13 MG/DL — SIGNIFICANT CHANGE UP (ref 7–23)
CALCIUM SERPL-MCNC: 10.3 MG/DL — SIGNIFICANT CHANGE UP (ref 8.4–10.5)
CHLORIDE SERPL-SCNC: 98 MMOL/L — SIGNIFICANT CHANGE UP (ref 96–108)
CO2 SERPL-SCNC: 24 MMOL/L — SIGNIFICANT CHANGE UP (ref 22–31)
CREAT SERPL-MCNC: 0.89 MG/DL — SIGNIFICANT CHANGE UP (ref 0.5–1.3)
EGFR: 77 ML/MIN/1.73M2 — SIGNIFICANT CHANGE UP
EOSINOPHIL # BLD AUTO: 0.08 K/UL — SIGNIFICANT CHANGE UP (ref 0–0.5)
EOSINOPHIL NFR BLD AUTO: 2 % — SIGNIFICANT CHANGE UP (ref 0–6)
GLUCOSE SERPL-MCNC: 93 MG/DL — SIGNIFICANT CHANGE UP (ref 70–99)
HCT VFR BLD CALC: 36.8 % — SIGNIFICANT CHANGE UP (ref 34.5–45)
HGB BLD-MCNC: 12 G/DL — SIGNIFICANT CHANGE UP (ref 11.5–15.5)
IMM GRANULOCYTES NFR BLD AUTO: 0 % — SIGNIFICANT CHANGE UP (ref 0–0.9)
LYMPHOCYTES # BLD AUTO: 1.38 K/UL — SIGNIFICANT CHANGE UP (ref 1–3.3)
LYMPHOCYTES # BLD AUTO: 34.8 % — SIGNIFICANT CHANGE UP (ref 13–44)
MCHC RBC-ENTMCNC: 27.2 PG — SIGNIFICANT CHANGE UP (ref 27–34)
MCHC RBC-ENTMCNC: 32.6 G/DL — SIGNIFICANT CHANGE UP (ref 32–36)
MCV RBC AUTO: 83.4 FL — SIGNIFICANT CHANGE UP (ref 80–100)
MONOCYTES # BLD AUTO: 0.36 K/UL — SIGNIFICANT CHANGE UP (ref 0–0.9)
MONOCYTES NFR BLD AUTO: 9.1 % — SIGNIFICANT CHANGE UP (ref 2–14)
NEUTROPHILS # BLD AUTO: 2.11 K/UL — SIGNIFICANT CHANGE UP (ref 1.8–7.4)
NEUTROPHILS NFR BLD AUTO: 53.3 % — SIGNIFICANT CHANGE UP (ref 43–77)
NRBC # BLD: 0 /100 WBCS — SIGNIFICANT CHANGE UP (ref 0–0)
PLATELET # BLD AUTO: 229 K/UL — SIGNIFICANT CHANGE UP (ref 150–400)
POTASSIUM SERPL-MCNC: 4.5 MMOL/L — SIGNIFICANT CHANGE UP (ref 3.5–5.3)
POTASSIUM SERPL-SCNC: 4.5 MMOL/L — SIGNIFICANT CHANGE UP (ref 3.5–5.3)
PROT SERPL-MCNC: 7.9 G/DL — SIGNIFICANT CHANGE UP (ref 6–8.3)
RBC # BLD: 4.41 M/UL — SIGNIFICANT CHANGE UP (ref 3.8–5.2)
RBC # FLD: 13.9 % — SIGNIFICANT CHANGE UP (ref 10.3–14.5)
SODIUM SERPL-SCNC: 135 MMOL/L — SIGNIFICANT CHANGE UP (ref 135–145)
TSH SERPL-MCNC: 1.92 UIU/ML — SIGNIFICANT CHANGE UP (ref 0.27–4.2)
WBC # BLD: 3.96 K/UL — SIGNIFICANT CHANGE UP (ref 3.8–10.5)
WBC # FLD AUTO: 3.96 K/UL — SIGNIFICANT CHANGE UP (ref 3.8–10.5)

## 2023-04-05 PROCEDURE — 99214 OFFICE O/P EST MOD 30 MIN: CPT

## 2023-04-05 PROCEDURE — 99213 OFFICE O/P EST LOW 20 MIN: CPT

## 2023-04-05 NOTE — HISTORY OF PRESENT ILLNESS
[Disease: _____________________] : Disease: [unfilled] [T: ___] : T[unfilled] [N: ___] : N[unfilled] [M: ___] : M[unfilled] [AJCC Stage: ____] : AJCC Stage: [unfilled] [de-identified] : Cervical cancer diagnosed at age 52 in 2022 while in [facility]\par Right breast cancer diagnosed simultaneously at age 52\par 7/2022 Pap smear revealed HSIL and +HPV.  Pathology noted SCCA, moderately differentiated\par 7/22/22 CT scan in [facility] identified cervical mass, 5.6 x 4.2 cm, invading the uterine isthmus and body. The parametria are infiltrated. Thickened uterine lining. Lymphadenopathy with right common iliac lymph node: 1.1 x 1.5 cm, right pelvic wall node: 1.8 x 3.1 cm, left pelvic wall node: 2.4 x 3.0 cm, right external iliac lymph node: 1.8 x 1.6 cm \par CT scan also reported a 1.6 x 1.7 cm tuberous mass at the borderline of the lower quadrant of the right breast, with no infiltration of the thoracic muscles.  Right axillary lymphadenopathy measuring 1.4 x 1.0 cm, 1.0 x 1.0 cm and 0.8 x 1.1 cm. \par 8/10/22 Cervical biopsy revealed high grade squamous intraepithelial lesion (HGSIL) (CIN3) with PD-L1 Tumor Proportion Score (TPS) 30%, positive\par 8/18/22 PET/CT scan showed a large area of intense activity within the uterine cavity extending into the uterine cervix with SUV 19.5. FDG avid retroperitoneal and bilateral pelvic lymphadenopathy (SUV 7.4 - 21.1). FDG avid right breast nodule (SUV 13.9) and right retropectoral (SUV 6.1), axillary (SUV 18.2) and internal mammary lymph nodes (SUV 5.8) suspicious for metastasis from breast. Mildly FDG avid prevascular and left retropectoral LN are indeterminate. Nonspecific FDG avidity in bilateral palatine tonsils, probably inflammatory. Nonspecific generalized BM hypermetabolism without CT correlate, probably reactive and may be related to anemia.\par 8/19/22 Mammogram and sonogram revealed heterogeneously dense breast parenchyma and an irregularly marginated mass in the upper outer quadrant  of the right breast with adjacent clusters of indeterminate calcifications. Sonogram revealed a 1.0 x 1.2 cm mass at 10:00. BI-RADS 5\par 8/23/22 Right breast biopsy revealed moderately to poorly differentiated IDC with extensive lymphovascular invasion, ER 95%, AZ 95%, HER-2 2+ FISH negative\par 8/26/22 Right axillary LN revealed metastatic adenocarcinoma c/w breast origin. \par 8/26/22 Left supraclavicular LN positive for metastatic squamous cell carcinoma consistent with Stage IV cervical cancer\par 9/1/22 - 1/4/23 Chemotherapy for cervical cancer with 6 cycles of Cisplatin/Taxol/Bevacizumab/Pembrolizumab. Bevacizumab d/c after 3 cycles due to mediport dehiscence\par 1/11/23 PET/CT scan showed interval response to therapy with residual low level FDG avidity in lymph nodes in the pelvis similar to blood pool and no new lesions.\par Interval resolution of FDG-avid right breast lesion and retropectoral, right axillary, mediastinal, and right internal mammary lymph nodes. Low-level symmetrical FDG avidity in the christopher, nonspecific.  No FDG avid lymph nodes in the axilla above background.\par Interval decrease in size and FDG avidity of pelvic lymphadenopathy. Reference nodes:\par -Interval resolution of right internal iliac node; previously measuring approximately 1.1 cm, SUV 14.\par -Right external iliac mass, SUV 3.7 measuring 1.4 x 0.7 cm, image 220; previously 3 x 1.9 cm, SUV 21.\par -Right internal iliac node, no discernible FDG avidity, 0.8 x 0.6 cm, image 217; previously SUV 7.4, 1.6 x 1.5 cm.\par -Left pelvic sidewall mass, SUV 3.3, 1.7 x 0.6 cm, image 224; previously SUV 20, 3.4 x 2.2 cm (SUV 20.2; image 231).\par 9/14/22 - 1/4/23 Cisplatin/Taxol/Bevacizumab/Pembrolizumab for cervical cancer. Bevacizumab d/c after 3 cycles due to mediport dehiscence. \par 1/11/23 PET scan showed a good response to treatment \par 2/1/23 Maintenance pembrolizumab started and to continue every 6 weeks\par 2/23 Lupron started and to continue every 3 months with anastrozole 1 mg daily\par \par  [de-identified] : Right breast IDC with extensive lymphovascular invasion, axillary LN positive, ER 95%, OR 95%, HER-2 2+ FISH negative [de-identified] : Shari comes with her daughter Tammy for follow up of her breast cancer.\par She completed C6 of chemotherapy for cervical cancer on 1/4/23 with Cisplatin/Taxol/Bevacizumab/Pembrolizumab. Myesha d/c after 3 cycles due to mediport dehiscence. PET scan showed a good response to treatment and she started maintenance pembrolizumab.\par She is recovering well from her chemotherapy with more energy and will be going back to work. Her hair is growing in.\par She started Lupron and anastrozole in 2/23 and is tolerating them very well with no arthralgias and mild hot flashes.\par \par HEALTH MAINTENANCE:\par PCP: Dr. Heather Kumari\par Mammogram and breast ultrasound: 8/19/22\par Bone density: will get baseline 6-12 months after starting Lupron\par Pap Smear: 7/22\par Colonoscopy: none\par

## 2023-04-05 NOTE — PHYSICAL EXAM
[Fully active, able to carry on all pre-disease performance without restriction] : Status 0 - Fully active, able to carry on all pre-disease performance without restriction [Normal] : affect appropriate [de-identified] : well healed skin to area of left chest previous mediport

## 2023-04-05 NOTE — REASON FOR VISIT
(0) understands/communicates without difficulty [Follow-Up Visit] : a follow-up [Other: _____] : [unfilled] [FreeTextEntry2] : Cervical cancer and Breast cancer.

## 2023-04-05 NOTE — HISTORY OF PRESENT ILLNESS
[Disease: _____________________] : Disease: [unfilled] [Treatment Protocol] : Treatment Protocol [de-identified] : 52 y.o female with newly diagnosed with cervical cancer in [facility].\par \par \par \par Ms. Sharma, 52 years old, accompanied by her daughter, Tammy and is referred by Dr. Charles, for HSIL (7/20/22 from [facility]) cervical mass, thickened endometrium noted on CT (from [facility]). 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 \par \par Pt is without symptoms; she has not seen a GYN in over 10 years and recalls never having an abnormal pap smear. She returned home to the [facility] to help her mother with medical problems and decided to have a GYN checkup at that time. Pap smear returned HSIL / +HPV. Pathology noted SCC moderately differentiated and CT scan identified cervical mass; thickened lining of the uterus; lymphadenopathy as well as a right breast mass and right axillary lymphadenopathy. \par \par She denies f/c/n/v/d/abnormal vaginal bleeding, changes to bowel or bladder habits. Denies unintentional weight loss or gain. Denies post-coital or intercycle vaginal bleeding; denies abdominal pain, distention, bloating, back pain or any other associated symptoms. \par \par Imaging:\par 7/22/20 ([facility], [facility])\par Breast: 1.6 x 1.7 cm tuberous, polycyclic contour, accumulating contrast medium, visualized at the borderline of the lower quadrants of the right breast, with no infiltration of the thoracic muscles. \par Right axillary region: 1.4 x 1.0 cm; 1.0 x 1.0 cm and 0.8 x 1.1 cm. lymphadenopathy. \par Liver: well defined even borders, the liver parenchyma is with signs of fat rearrangement, a hypdense inclusion 1.6 x 2.1 cm in size, is subcapsularly visualized Segment 8 of the liver. It lacunarly accumulates contrast medium, additionally accumulates contrast medium during the delayed phase. The intrahepatic and extrahepatic bile ducts are not dilated. \par Abdominal lymph nodes: paraaortic and interaortocaval lymph nodes are sized 10-12 mm, weakly accumulate contrast medium. \par No free fluid in the abdominal cavity. \par Uterus: 5.9 x 7.5 cm. \par EM: 15 mm with dense fluid visualized, mildly heterogeneous. \par Cervix: tissue lesion 56 x 42 mm invading the uterine isthmus and body. The parametria are infiltrated. \par Uterine appendages are normally positioned, a cystic inclusion of 3.3 x 3.5 cm is visualized in the projection of the left ovary. \par Right Ovary: 2.0 x 2.8 cm. \par Lymph Nodes:\par - right common iliac lymph node: 1.1 x 1.5 cm.\par - right pelvic wall node: 1.8 x 3.1 cm\par - left pelvic wall node: 2.4 x 3.0 cm. \par - right external iliac lymph node: 1.8 x 1.6 cm \par \par She was seen by Dr. 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 \par Menarche: 12\par Menopause: still menstruating. LMP 7/30/22\par \par She completed C6 of chemotherapy for cervical cancer on 1/4/23 with Cisplatin/Taxol/Bevacizumab/Pembrolizumab. Myesha d/c after 3 cycles due to mediport dehiscence. PET scan showed a good response to treatment and she is now going to start maintenance pembrolizumab.\par \par Pet scan shows very good response in breast and lymph nodes from chemotherapy     (Cis/Taxol/Keytruda). Patient sees Dr. Perrin and Dr. Acosta for stage IIIC breast cancer.\par     Given Stage IV cervical cancer there is no indication for doing breast surgery at this time, as per the breast team. Patient started on Lupron and anastrozole. \par \par \par \par \par  [FreeTextEntry1] : Pembro/ Lupron/ Anastrazole [de-identified] : Patient is here for follow up and treatment.\par Overall she is feeling well and has no new complaints.\par She has some occasional persistent numbness of right hand and toes of right foot.\par She has some intermittent hot flashes, not affecting her daily routine.\par Good appetite and energy levels.\par Denies chest pain, SOB, cough, abdominal pain, nausea, vomiting, diarrhea, constipation, bleeding, rash.

## 2023-04-06 ENCOUNTER — APPOINTMENT (OUTPATIENT)
Dept: SURGICAL ONCOLOGY | Facility: CLINIC | Age: 53
End: 2023-04-06
Payer: MEDICAID

## 2023-04-06 VITALS
RESPIRATION RATE: 16 BRPM | SYSTOLIC BLOOD PRESSURE: 107 MMHG | HEIGHT: 60 IN | OXYGEN SATURATION: 97 % | DIASTOLIC BLOOD PRESSURE: 75 MMHG | HEART RATE: 83 BPM | BODY MASS INDEX: 30.23 KG/M2 | WEIGHT: 154 LBS

## 2023-04-06 PROCEDURE — 99214 OFFICE O/P EST MOD 30 MIN: CPT

## 2023-04-06 NOTE — ASSESSMENT
[FreeTextEntry1] : IMP:\par 51yo F w/ metastatic cervical cancer & synchronous right breast IDC (ER+/NC+/HER2-)\par \par 8/26/22- right axillary LN metastatic adenocarcinoma c/w breast origin \par 8/26/22- left supraclavicular LN positive for metastatic SCC c/w stage IV cervical cancer\par \par CT chest/abd/pelvis 11/15/22: resolved previously seen lymphadenopathy. No evidence for residual right breast mass however dedicated right breast imaging or PET/CT would be more sensitive. Decreased overall size of the cervical mass which would be best assessed by pelvic MRI. \par \par on cisplatin/Taxol/Keytruda for cervical ca (completed 6 cycles as of 1/4/2023)  Avastin D/Alfredo r/t port dehiscence \par \par B/L mammo/sono 1/10/23 showed biopsy proven right breast (10:00) carcinoma, decreased in size & stable calcifications, indeterminate right breast mass to 2:00- U/S biopsy recommended, BI-RADS 4A\par \par PET/CT 1/11/23- Compared with PET scan 8/8/2022, there has been interval response to therapy. Residual low-level FDG avidity in lymph nodes in the pelvis is similar to blood pool. No new lesions. FDG-avidity in the soft tissues around the Mediport removal, query soft tissue infection.\par \par CT chest/abd/pelvis 3/2023: ROYA \par \par PLAN:\par No role for breast surgery at this time\par RTO 3 months \par b/l mammo/sono August 2023\par \par \par

## 2023-04-06 NOTE — PHYSICAL EXAM
[Normal] : supple, no neck mass and thyroid not enlarged [Normal Neck Lymph Nodes] : normal neck lymph nodes  [Normal Supraclavicular Lymph Nodes] : normal supraclavicular lymph nodes [Normal Axillary Lymph Nodes] : normal axillary lymph nodes [Normal] : oriented to person, place and time, with appropriate affect [de-identified] : no masses

## 2023-04-06 NOTE — HISTORY OF PRESENT ILLNESS
[de-identified] : Ms. MARISOL OROSCO is a 53 year old woman, primarily Lebanese speaking, who presents today for an follow up breast cancer.\par Accompanied by her daughter, Tammy, who the patient prefers to translate for her. \par \par B/L mammo 7/26/22 ([facility]) with benign findings to left breast (BIRADS 2) and right breast shows a suspicious but probably benign lesion (BIRADS 3).\par \par She returned to [place] from the [facility] to establish care after having a biopsy in [facility] that was c/w cervical cancer. She established care with Dr. Radha Holm who repeated a cervical biopsy (path shows high grade squamous intraepithelial lesion to cervix, fragments of SCC w/ extensive tumor necrosis to endocervix) Dr. Holm also ordered PET/CT & further breast imaging \par \par PET/CT 8/18/22 shows large area of intense activity w/in the uterine cavity corresponding to known uterine malignancy, nonspecific focal FDG activity in vaginal region, concerning for disease involvement. FDG avid retroperitoneal & B/L pelvic lymphadenopathy, c/w metastases from uterus. FDG avid right breast nodule likely malignancy. FDG avid right retropectoral, right axillary and right internal mammary nodes, suspicious for metastasis from breast malignancy. Mildly FDG avid prevascular and left retropectoral lymph nodes are indeterminate. Nonspecific approx symmetric FDG avidity in b/l palatine tonsils, probably inflammatory. Nonspecific generalized bone marrow hypermetabolism w/o CT correlate, probably reactive and may be related to anemia.\par \par right breast diagnostic mammo/ bilateral sono to follow-up on [facility] abnormal imaging, done on 8/19/2022. \par US showed suspicious mass to right breast 10:00 4 cm fn, US biopsy recommended, highly suspicious for malignancy, BI-RADS 5.\par \par 8/23/22, right breast US biopsy, 10:00 4 cm fn, path: moderately to poorly differentiated invasive ductal carcinoma with mucinous and micropapillary features, 1.4 cm, intermediate grade DCIS, cribriform pattern, extensive lymphovascular invasion present, microcalcifications present in malignant and benign epithelium. ER+/NM+/HER2 negative by FISH\par \par 8/26/22- right axillary LN metastatic adenocarcinoma c/w breast origin \par 8/26/22- left supraclavicular LN positive for metastatic SCC c/w stage IV cervical cancer\par \par CT chest/abd/pelvis 11/15/22: resolved previously seen lymphadenopathy. No evidence for residual right breast mass however dedicated right breast imaging or PET/CT would be more sensitive. Decreased overall size of the cervical mass which would be best assessed by pelvic MRI. \par \par Family history of father with stomach cancer. \par \par B/L mammo/sono 1/10/23 showed biopsy proven right breast (10:00) carcinoma, decreased in size & stable calcifications, indeterminate right breast mass to 2:00- U/S biopsy recommended, BI-RADS 4A\par \par PET/CT 1/11/23- Compared with PET scan 8/8/2022, there has been interval response to therapy. Residual low-level FDG avidity in lymph nodes in the pelvis is similar to blood pool. No new lesions. FDG-avidity in the soft tissues around the Mediport removal, query soft tissue infection.\par \par \par CT chest/abd/pelvis 3/20/23: ROYA\par

## 2023-05-11 ENCOUNTER — OUTPATIENT (OUTPATIENT)
Dept: OUTPATIENT SERVICES | Facility: HOSPITAL | Age: 53
LOS: 1 days | Discharge: HOME | End: 2023-05-11

## 2023-05-11 DIAGNOSIS — C53.9 MALIGNANT NEOPLASM OF CERVIX UTERI, UNSPECIFIED: ICD-10-CM

## 2023-05-26 ENCOUNTER — RESULT REVIEW (OUTPATIENT)
Age: 53
End: 2023-05-26

## 2023-05-26 ENCOUNTER — APPOINTMENT (OUTPATIENT)
Dept: INFUSION THERAPY | Facility: HOSPITAL | Age: 53
End: 2023-05-26

## 2023-05-26 ENCOUNTER — APPOINTMENT (OUTPATIENT)
Dept: HEMATOLOGY ONCOLOGY | Facility: CLINIC | Age: 53
End: 2023-05-26
Payer: MEDICAID

## 2023-05-26 VITALS
RESPIRATION RATE: 17 BRPM | DIASTOLIC BLOOD PRESSURE: 86 MMHG | BODY MASS INDEX: 30.95 KG/M2 | HEART RATE: 80 BPM | WEIGHT: 157.63 LBS | SYSTOLIC BLOOD PRESSURE: 132 MMHG | HEIGHT: 60 IN | OXYGEN SATURATION: 97 %

## 2023-05-26 DIAGNOSIS — R11.2 NAUSEA WITH VOMITING, UNSPECIFIED: ICD-10-CM

## 2023-05-26 DIAGNOSIS — Z51.11 ENCOUNTER FOR ANTINEOPLASTIC CHEMOTHERAPY: ICD-10-CM

## 2023-05-26 LAB
ALBUMIN SERPL ELPH-MCNC: 4.7 G/DL — SIGNIFICANT CHANGE UP (ref 3.3–5)
ALP SERPL-CCNC: 69 U/L — SIGNIFICANT CHANGE UP (ref 40–120)
ALT FLD-CCNC: 17 U/L — SIGNIFICANT CHANGE UP (ref 10–45)
ANION GAP SERPL CALC-SCNC: 14 MMOL/L — SIGNIFICANT CHANGE UP (ref 5–17)
AST SERPL-CCNC: 24 U/L — SIGNIFICANT CHANGE UP (ref 10–40)
BASOPHILS # BLD AUTO: 0.04 K/UL — SIGNIFICANT CHANGE UP (ref 0–0.2)
BASOPHILS NFR BLD AUTO: 0.6 % — SIGNIFICANT CHANGE UP (ref 0–2)
BILIRUB SERPL-MCNC: 0.2 MG/DL — SIGNIFICANT CHANGE UP (ref 0.2–1.2)
BUN SERPL-MCNC: 23 MG/DL — SIGNIFICANT CHANGE UP (ref 7–23)
CALCIUM SERPL-MCNC: 10.2 MG/DL — SIGNIFICANT CHANGE UP (ref 8.4–10.5)
CANCER AG125 SERPL-ACNC: 15 U/ML — SIGNIFICANT CHANGE UP
CHLORIDE SERPL-SCNC: 104 MMOL/L — SIGNIFICANT CHANGE UP (ref 96–108)
CO2 SERPL-SCNC: 24 MMOL/L — SIGNIFICANT CHANGE UP (ref 22–31)
CREAT SERPL-MCNC: 1 MG/DL — SIGNIFICANT CHANGE UP (ref 0.5–1.3)
EGFR: 67 ML/MIN/1.73M2 — SIGNIFICANT CHANGE UP
EOSINOPHIL # BLD AUTO: 0.11 K/UL — SIGNIFICANT CHANGE UP (ref 0–0.5)
EOSINOPHIL NFR BLD AUTO: 1.8 % — SIGNIFICANT CHANGE UP (ref 0–6)
GLUCOSE SERPL-MCNC: 85 MG/DL — SIGNIFICANT CHANGE UP (ref 70–99)
HCT VFR BLD CALC: 36.7 % — SIGNIFICANT CHANGE UP (ref 34.5–45)
HGB BLD-MCNC: 11.6 G/DL — SIGNIFICANT CHANGE UP (ref 11.5–15.5)
IMM GRANULOCYTES NFR BLD AUTO: 0.3 % — SIGNIFICANT CHANGE UP (ref 0–0.9)
LYMPHOCYTES # BLD AUTO: 1.45 K/UL — SIGNIFICANT CHANGE UP (ref 1–3.3)
LYMPHOCYTES # BLD AUTO: 23.3 % — SIGNIFICANT CHANGE UP (ref 13–44)
MCHC RBC-ENTMCNC: 26.5 PG — LOW (ref 27–34)
MCHC RBC-ENTMCNC: 31.6 G/DL — LOW (ref 32–36)
MCV RBC AUTO: 83.8 FL — SIGNIFICANT CHANGE UP (ref 80–100)
MONOCYTES # BLD AUTO: 0.42 K/UL — SIGNIFICANT CHANGE UP (ref 0–0.9)
MONOCYTES NFR BLD AUTO: 6.7 % — SIGNIFICANT CHANGE UP (ref 2–14)
NEUTROPHILS # BLD AUTO: 4.19 K/UL — SIGNIFICANT CHANGE UP (ref 1.8–7.4)
NEUTROPHILS NFR BLD AUTO: 67.3 % — SIGNIFICANT CHANGE UP (ref 43–77)
NRBC # BLD: 0 /100 WBCS — SIGNIFICANT CHANGE UP (ref 0–0)
PLATELET # BLD AUTO: 263 K/UL — SIGNIFICANT CHANGE UP (ref 150–400)
POTASSIUM SERPL-MCNC: 4.2 MMOL/L — SIGNIFICANT CHANGE UP (ref 3.5–5.3)
POTASSIUM SERPL-SCNC: 4.2 MMOL/L — SIGNIFICANT CHANGE UP (ref 3.5–5.3)
PROT SERPL-MCNC: 7.9 G/DL — SIGNIFICANT CHANGE UP (ref 6–8.3)
RBC # BLD: 4.38 M/UL — SIGNIFICANT CHANGE UP (ref 3.8–5.2)
RBC # FLD: 13.8 % — SIGNIFICANT CHANGE UP (ref 10.3–14.5)
SODIUM SERPL-SCNC: 143 MMOL/L — SIGNIFICANT CHANGE UP (ref 135–145)
TSH SERPL-MCNC: 2.72 UIU/ML — SIGNIFICANT CHANGE UP (ref 0.27–4.2)
WBC # BLD: 6.23 K/UL — SIGNIFICANT CHANGE UP (ref 3.8–10.5)
WBC # FLD AUTO: 6.23 K/UL — SIGNIFICANT CHANGE UP (ref 3.8–10.5)

## 2023-05-26 PROCEDURE — 99214 OFFICE O/P EST MOD 30 MIN: CPT

## 2023-05-26 NOTE — HISTORY OF PRESENT ILLNESS
[Disease: _____________________] : Disease: [unfilled] [de-identified] : 52 y.o female with newly diagnosed with cervical cancer in [facility].\par \par \par \par Ms. Sharma, 52 years old, accompanied by her daughter, Tammy and is referred by Dr. Charles, for HSIL (7/20/22 from [facility]) cervical mass, thickened endometrium noted on CT (from [facility]). 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 \par \par Pt is without symptoms; she has not seen a GYN in over 10 years and recalls never having an abnormal pap smear. She returned home to the [facility] to help her mother with medical problems and decided to have a GYN checkup at that time. Pap smear returned HSIL / +HPV. Pathology noted SCC moderately differentiated and CT scan identified cervical mass; thickened lining of the uterus; lymphadenopathy as well as a right breast mass and right axillary lymphadenopathy. \par \par She denies f/c/n/v/d/abnormal vaginal bleeding, changes to bowel or bladder habits. Denies unintentional weight loss or gain. Denies post-coital or intercycle vaginal bleeding; denies abdominal pain, distention, bloating, back pain or any other associated symptoms. \par \par Imaging:\par 7/22/20 ([facility], [facility])\par Breast: 1.6 x 1.7 cm tuberous, polycyclic contour, accumulating contrast medium, visualized at the borderline of the lower quadrants of the right breast, with no infiltration of the thoracic muscles. \par Right axillary region: 1.4 x 1.0 cm; 1.0 x 1.0 cm and 0.8 x 1.1 cm. lymphadenopathy. \par Liver: well defined even borders, the liver parenchyma is with signs of fat rearrangement, a hypdense inclusion 1.6 x 2.1 cm in size, is subcapsularly visualized Segment 8 of the liver. It lacunarly accumulates contrast medium, additionally accumulates contrast medium during the delayed phase. The intrahepatic and extrahepatic bile ducts are not dilated. \par Abdominal lymph nodes: paraaortic and interaortocaval lymph nodes are sized 10-12 mm, weakly accumulate contrast medium. \par No free fluid in the abdominal cavity. \par Uterus: 5.9 x 7.5 cm. \par EM: 15 mm with dense fluid visualized, mildly heterogeneous. \par Cervix: tissue lesion 56 x 42 mm invading the uterine isthmus and body. The parametria are infiltrated. \par Uterine appendages are normally positioned, a cystic inclusion of 3.3 x 3.5 cm is visualized in the projection of the left ovary. \par Right Ovary: 2.0 x 2.8 cm. \par Lymph Nodes:\par - right common iliac lymph node: 1.1 x 1.5 cm.\par - right pelvic wall node: 1.8 x 3.1 cm\par - left pelvic wall node: 2.4 x 3.0 cm. \par - right external iliac lymph node: 1.8 x 1.6 cm \par \par She was seen by Dr. 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 \par Menarche: 12\par Menopause: still menstruating. LMP 7/30/22\par \par She completed C6 of chemotherapy for cervical cancer on 1/4/23 with Cisplatin/Taxol/Bevacizumab/Pembrolizumab. Myesha d/c after 3 cycles due to mediport dehiscence. PET scan showed a good response to treatment and she is now going to start maintenance pembrolizumab.\par \par Pet scan shows very good response in breast and lymph nodes from chemotherapy     (Cis/Taxol/Keytruda). Patient sees Dr. Perrin and Dr. Acosta for stage IIIC breast cancer.\par     Given Stage IV cervical cancer there is no indication for doing breast surgery at this time, as per the breast team. Patient started on Lupron and anastrozole. \par \par \par \par \par  [de-identified] : Patient just got back from [place]. She had a good time in [place]. She denies any pain. appetite is normal.\par No bowel or bladder issues.

## 2023-05-26 NOTE — REASON FOR VISIT
[Follow-Up Visit] : a follow-up [Family Member] : family member [FreeTextEntry2] : cervical cancer and breast cancer.

## 2023-05-31 ENCOUNTER — APPOINTMENT (OUTPATIENT)
Dept: GYNECOLOGIC ONCOLOGY | Facility: CLINIC | Age: 53
End: 2023-05-31
Payer: MEDICAID

## 2023-05-31 VITALS
WEIGHT: 157 LBS | HEIGHT: 60 IN | DIASTOLIC BLOOD PRESSURE: 82 MMHG | SYSTOLIC BLOOD PRESSURE: 136 MMHG | HEART RATE: 74 BPM | BODY MASS INDEX: 30.82 KG/M2

## 2023-05-31 PROCEDURE — 99214 OFFICE O/P EST MOD 30 MIN: CPT

## 2023-07-05 ENCOUNTER — RESULT REVIEW (OUTPATIENT)
Age: 53
End: 2023-07-05

## 2023-07-05 ENCOUNTER — APPOINTMENT (OUTPATIENT)
Dept: HEMATOLOGY ONCOLOGY | Facility: CLINIC | Age: 53
End: 2023-07-05

## 2023-07-05 ENCOUNTER — APPOINTMENT (OUTPATIENT)
Dept: INFUSION THERAPY | Facility: HOSPITAL | Age: 53
End: 2023-07-05

## 2023-07-05 VITALS
TEMPERATURE: 97.2 F | SYSTOLIC BLOOD PRESSURE: 125 MMHG | RESPIRATION RATE: 16 BRPM | WEIGHT: 157.63 LBS | HEART RATE: 64 BPM | DIASTOLIC BLOOD PRESSURE: 83 MMHG | BODY MASS INDEX: 30.78 KG/M2 | OXYGEN SATURATION: 98 %

## 2023-07-05 LAB
ALBUMIN SERPL ELPH-MCNC: 4.5 G/DL — SIGNIFICANT CHANGE UP (ref 3.3–5)
ALP SERPL-CCNC: 70 U/L — SIGNIFICANT CHANGE UP (ref 40–120)
ALT FLD-CCNC: 25 U/L — SIGNIFICANT CHANGE UP (ref 10–45)
ANION GAP SERPL CALC-SCNC: 15 MMOL/L — SIGNIFICANT CHANGE UP (ref 5–17)
AST SERPL-CCNC: 31 U/L — SIGNIFICANT CHANGE UP (ref 10–40)
BASOPHILS # BLD AUTO: 0.05 K/UL — SIGNIFICANT CHANGE UP (ref 0–0.2)
BASOPHILS NFR BLD AUTO: 1.2 % — SIGNIFICANT CHANGE UP (ref 0–2)
BILIRUB SERPL-MCNC: <0.2 MG/DL — SIGNIFICANT CHANGE UP (ref 0.2–1.2)
BUN SERPL-MCNC: 19 MG/DL — SIGNIFICANT CHANGE UP (ref 7–23)
CALCIUM SERPL-MCNC: 9.9 MG/DL — SIGNIFICANT CHANGE UP (ref 8.4–10.5)
CHLORIDE SERPL-SCNC: 103 MMOL/L — SIGNIFICANT CHANGE UP (ref 96–108)
CO2 SERPL-SCNC: 22 MMOL/L — SIGNIFICANT CHANGE UP (ref 22–31)
CREAT SERPL-MCNC: 0.84 MG/DL — SIGNIFICANT CHANGE UP (ref 0.5–1.3)
EGFR: 83 ML/MIN/1.73M2 — SIGNIFICANT CHANGE UP
EOSINOPHIL # BLD AUTO: 0.14 K/UL — SIGNIFICANT CHANGE UP (ref 0–0.5)
EOSINOPHIL NFR BLD AUTO: 3.4 % — SIGNIFICANT CHANGE UP (ref 0–6)
GLUCOSE SERPL-MCNC: 80 MG/DL — SIGNIFICANT CHANGE UP (ref 70–99)
HCT VFR BLD CALC: 34.6 % — SIGNIFICANT CHANGE UP (ref 34.5–45)
HGB BLD-MCNC: 11.2 G/DL — LOW (ref 11.5–15.5)
IMM GRANULOCYTES NFR BLD AUTO: 0.2 % — SIGNIFICANT CHANGE UP (ref 0–0.9)
LYMPHOCYTES # BLD AUTO: 1.24 K/UL — SIGNIFICANT CHANGE UP (ref 1–3.3)
LYMPHOCYTES # BLD AUTO: 29.7 % — SIGNIFICANT CHANGE UP (ref 13–44)
MCHC RBC-ENTMCNC: 26.2 PG — LOW (ref 27–34)
MCHC RBC-ENTMCNC: 32.4 G/DL — SIGNIFICANT CHANGE UP (ref 32–36)
MCV RBC AUTO: 81 FL — SIGNIFICANT CHANGE UP (ref 80–100)
MONOCYTES # BLD AUTO: 0.43 K/UL — SIGNIFICANT CHANGE UP (ref 0–0.9)
MONOCYTES NFR BLD AUTO: 10.3 % — SIGNIFICANT CHANGE UP (ref 2–14)
NEUTROPHILS # BLD AUTO: 2.3 K/UL — SIGNIFICANT CHANGE UP (ref 1.8–7.4)
NEUTROPHILS NFR BLD AUTO: 55.2 % — SIGNIFICANT CHANGE UP (ref 43–77)
NRBC # BLD: 0 /100 WBCS — SIGNIFICANT CHANGE UP (ref 0–0)
PLATELET # BLD AUTO: 220 K/UL — SIGNIFICANT CHANGE UP (ref 150–400)
POTASSIUM SERPL-MCNC: 4.5 MMOL/L — SIGNIFICANT CHANGE UP (ref 3.5–5.3)
POTASSIUM SERPL-SCNC: 4.5 MMOL/L — SIGNIFICANT CHANGE UP (ref 3.5–5.3)
PROT SERPL-MCNC: 7.2 G/DL — SIGNIFICANT CHANGE UP (ref 6–8.3)
RBC # BLD: 4.27 M/UL — SIGNIFICANT CHANGE UP (ref 3.8–5.2)
RBC # FLD: 14.4 % — SIGNIFICANT CHANGE UP (ref 10.3–14.5)
SODIUM SERPL-SCNC: 139 MMOL/L — SIGNIFICANT CHANGE UP (ref 135–145)
TSH SERPL-MCNC: 2.97 UIU/ML — SIGNIFICANT CHANGE UP (ref 0.27–4.2)
WBC # BLD: 4.17 K/UL — SIGNIFICANT CHANGE UP (ref 3.8–10.5)
WBC # FLD AUTO: 4.17 K/UL — SIGNIFICANT CHANGE UP (ref 3.8–10.5)

## 2023-07-05 NOTE — REASON FOR VISIT
[Follow-Up Visit] : a follow-up [Other: _____] : [unfilled] [FreeTextEntry2] : Cervical cancer and Breast cancer.

## 2023-07-05 NOTE — HISTORY OF PRESENT ILLNESS
[Disease: _____________________] : Disease: [unfilled] [T: ___] : T[unfilled] [N: ___] : N[unfilled] [M: ___] : M[unfilled] [AJCC Stage: ____] : AJCC Stage: [unfilled] [de-identified] : Cervical cancer diagnosed at age 52 in 2022 while in [facility]\par Right breast cancer diagnosed simultaneously at age 52\par 7/2022 Pap smear revealed HSIL and +HPV.  Pathology noted SCCA, moderately differentiated\par 7/22/22 CT scan in [facility] identified cervical mass, 5.6 x 4.2 cm, invading the uterine isthmus and body. The parametria are infiltrated. Thickened uterine lining. Lymphadenopathy with right common iliac lymph node: 1.1 x 1.5 cm, right pelvic wall node: 1.8 x 3.1 cm, left pelvic wall node: 2.4 x 3.0 cm, right external iliac lymph node: 1.8 x 1.6 cm \par CT scan also reported a 1.6 x 1.7 cm tuberous mass at the borderline of the lower quadrant of the right breast, with no infiltration of the thoracic muscles.  Right axillary lymphadenopathy measuring 1.4 x 1.0 cm, 1.0 x 1.0 cm and 0.8 x 1.1 cm. \par 8/10/22 Cervical biopsy revealed high grade squamous intraepithelial lesion (HGSIL) (CIN3) with PD-L1 Tumor Proportion Score (TPS) 30%, positive\par 8/18/22 PET/CT scan showed a large area of intense activity within the uterine cavity extending into the uterine cervix with SUV 19.5. FDG avid retroperitoneal and bilateral pelvic lymphadenopathy (SUV 7.4 - 21.1). FDG avid right breast nodule (SUV 13.9) and right retropectoral (SUV 6.1), axillary (SUV 18.2) and internal mammary lymph nodes (SUV 5.8) suspicious for metastasis from breast. Mildly FDG avid prevascular and left retropectoral LN are indeterminate. Nonspecific FDG avidity in bilateral palatine tonsils, probably inflammatory. Nonspecific generalized BM hypermetabolism without CT correlate, probably reactive and may be related to anemia.\par 8/19/22 Mammogram and sonogram revealed heterogeneously dense breast parenchyma and an irregularly marginated mass in the upper outer quadrant  of the right breast with adjacent clusters of indeterminate calcifications. Sonogram revealed a 1.0 x 1.2 cm mass at 10:00. BI-RADS 5\par 8/23/22 Right breast biopsy revealed moderately to poorly differentiated IDC with extensive lymphovascular invasion, ER 95%, ND 95%, HER-2 2+ FISH negative\par 8/26/22 Right axillary LN revealed metastatic adenocarcinoma c/w breast origin. \par 8/26/22 Left supraclavicular LN positive for metastatic squamous cell carcinoma consistent with Stage IV cervical cancer\par 9/1/22 - 1/4/23 Chemotherapy for cervical cancer with 6 cycles of Cisplatin/Taxol/Bevacizumab/Pembrolizumab. Bevacizumab d/c after 3 cycles due to mediport dehiscence\par 1/11/23 PET/CT scan showed interval response to therapy with residual low level FDG avidity in lymph nodes in the pelvis similar to blood pool and no new lesions.\par Interval resolution of FDG-avid right breast lesion and retropectoral, right axillary, mediastinal, and right internal mammary lymph nodes. Low-level symmetrical FDG avidity in the christopher, nonspecific.  No FDG avid lymph nodes in the axilla above background.\par Interval decrease in size and FDG avidity of pelvic lymphadenopathy. Reference nodes:\par -Interval resolution of right internal iliac node; previously measuring approximately 1.1 cm, SUV 14.\par -Right external iliac mass, SUV 3.7 measuring 1.4 x 0.7 cm, image 220; previously 3 x 1.9 cm, SUV 21.\par -Right internal iliac node, no discernible FDG avidity, 0.8 x 0.6 cm, image 217; previously SUV 7.4, 1.6 x 1.5 cm.\par -Left pelvic sidewall mass, SUV 3.3, 1.7 x 0.6 cm, image 224; previously SUV 20, 3.4 x 2.2 cm (SUV 20.2; image 231).\par 9/14/22 - 1/4/23 Cisplatin/Taxol/Bevacizumab/Pembrolizumab for cervical cancer. Bevacizumab d/c after 3 cycles due to mediport dehiscence. \par 1/11/23 PET scan showed a good response to treatment \par 2/1/23 Maintenance pembrolizumab started and to continue every 6 weeks\par 2/23 Lupron started and to continue every 3 months with anastrozole 1 mg daily\par \par  [de-identified] : Right breast IDC with extensive lymphovascular invasion, axillary LN positive, ER 95%, MS 95%, HER-2 2+ FISH negative [de-identified] : Shari presents to the office accompanied by her daughter Tammy , who also severed as an .\par She completed C6 of chemotherapy for cervical cancer on 1/4/23 with Cisplatin/Taxol/Bevacizumab/Pembrolizumab. Myesha d/c after 3 cycles due to mediport dehiscence. \par PET scan showed a good response to treatment and she started maintenance pembrolizumab every 6 weeks..\par She is recovering well from her chemotherapy with more energy and will be going back to work. Her hair is growing in.\par She started Lupron and anastrozole in 2/23 and is tolerating them very well with no arthralgias and mild hot flashes.\par \par HEALTH MAINTENANCE:\par PCP: Dr. Heather Kumari\par Mammogram and breast ultrasound: 8/19/22\par Bone density: will get baseline 6-12 months after starting Lupron\par Pap Smear: 7/22\par Colonoscopy: none. \par

## 2023-07-05 NOTE — HISTORY OF PRESENT ILLNESS
[Disease: _____________________] : Disease: [unfilled] [de-identified] : 52 y.o female with newly diagnosed with cervical cancer in [facility].\par \par \par \par Ms. Sharma, 52 years old, accompanied by her daughter, Tammy and is referred by Dr. Charles, for HSIL (7/20/22 from [facility]) cervical mass, thickened endometrium noted on CT (from [facility]). 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 \par \par Pt is without symptoms; she has not seen a GYN in over 10 years and recalls never having an abnormal pap smear. She returned home to the [facility] to help her mother with medical problems and decided to have a GYN checkup at that time. Pap smear returned HSIL / +HPV. Pathology noted SCC moderately differentiated and CT scan identified cervical mass; thickened lining of the uterus; lymphadenopathy as well as a right breast mass and right axillary lymphadenopathy. \par \par She denies f/c/n/v/d/abnormal vaginal bleeding, changes to bowel or bladder habits. Denies unintentional weight loss or gain. Denies post-coital or intercycle vaginal bleeding; denies abdominal pain, distention, bloating, back pain or any other associated symptoms. \par \par Imaging:\par 7/22/20 ([facility], [facility])\par Breast: 1.6 x 1.7 cm tuberous, polycyclic contour, accumulating contrast medium, visualized at the borderline of the lower quadrants of the right breast, with no infiltration of the thoracic muscles. \par Right axillary region: 1.4 x 1.0 cm; 1.0 x 1.0 cm and 0.8 x 1.1 cm. lymphadenopathy. \par Liver: well defined even borders, the liver parenchyma is with signs of fat rearrangement, a hypdense inclusion 1.6 x 2.1 cm in size, is subcapsularly visualized Segment 8 of the liver. It lacunarly accumulates contrast medium, additionally accumulates contrast medium during the delayed phase. The intrahepatic and extrahepatic bile ducts are not dilated. \par Abdominal lymph nodes: paraaortic and interaortocaval lymph nodes are sized 10-12 mm, weakly accumulate contrast medium. \par No free fluid in the abdominal cavity. \par Uterus: 5.9 x 7.5 cm. \par EM: 15 mm with dense fluid visualized, mildly heterogeneous. \par Cervix: tissue lesion 56 x 42 mm invading the uterine isthmus and body. The parametria are infiltrated. \par Uterine appendages are normally positioned, a cystic inclusion of 3.3 x 3.5 cm is visualized in the projection of the left ovary. \par Right Ovary: 2.0 x 2.8 cm. \par Lymph Nodes:\par - right common iliac lymph node: 1.1 x 1.5 cm.\par - right pelvic wall node: 1.8 x 3.1 cm\par - left pelvic wall node: 2.4 x 3.0 cm. \par - right external iliac lymph node: 1.8 x 1.6 cm \par \par She was seen by Dr. 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 \par Menarche: 12\par Menopause: still menstruating. LMP 7/30/22\par \par She completed C6 of chemotherapy for cervical cancer on 1/4/23 with Cisplatin/Taxol/Bevacizumab/Pembrolizumab. Myesha d/c after 3 cycles due to mediport dehiscence. PET scan showed a good response to treatment and she is now going to start maintenance pembrolizumab.\par \par Pet scan shows very good response in breast and lymph nodes from chemotherapy     (Cis/Taxol/Keytruda). Patient sees Dr. Perrin and Dr. Acosta for stage IIIC breast cancer.\par     Given Stage IV cervical cancer there is no indication for doing breast surgery at this time, as per the breast team. Patient started on Lupron and anastrozole. \par \par \par \par \par  [FreeTextEntry1] : Keytruda every 6 weeks [de-identified] : Patient is here for treatment and follow up.\par She is doing well, has no complaints.\par Neuropathy is better.\par Good appetite and energy levels.\par Denies chest pain, SOB, abdominal pain, nausea, vomiting, diarrhea, constipation, bleeding, swelling, rash.

## 2023-07-17 ENCOUNTER — APPOINTMENT (OUTPATIENT)
Dept: SURGICAL ONCOLOGY | Facility: CLINIC | Age: 53
End: 2023-07-17
Payer: MEDICAID

## 2023-07-17 VITALS
HEIGHT: 61 IN | WEIGHT: 154 LBS | SYSTOLIC BLOOD PRESSURE: 128 MMHG | DIASTOLIC BLOOD PRESSURE: 79 MMHG | BODY MASS INDEX: 29.07 KG/M2 | HEART RATE: 78 BPM | OXYGEN SATURATION: 98 % | RESPIRATION RATE: 16 BRPM

## 2023-07-17 PROCEDURE — 99214 OFFICE O/P EST MOD 30 MIN: CPT

## 2023-07-17 NOTE — PHYSICAL EXAM
[Normal] : supple, no neck mass and thyroid not enlarged [Normal Neck Lymph Nodes] : normal neck lymph nodes  [Normal Supraclavicular Lymph Nodes] : normal supraclavicular lymph nodes [Normal Axillary Lymph Nodes] : normal axillary lymph nodes [Normal] : oriented to person, place and time, with appropriate affect [de-identified] : no masses

## 2023-07-17 NOTE — ASSESSMENT
[FreeTextEntry1] : IMP:\par 52yo F w/ metastatic cervical cancer & synchronous right breast IDC (ER+/MA+/HER2-)\par \par 8/26/22- right axillary LN metastatic adenocarcinoma c/w breast origin \par 8/26/22- left supraclavicular LN positive for metastatic SCC c/w stage IV cervical cancer\par \par on cisplatin/Taxol/Keytruda for cervical ca (completed 6 cycles as of 1/4/2023)  Avastin D/Alfredo r/t port dehiscence \par \par B/L mammo/sono 1/10/23 showed biopsy proven right breast (10:00) carcinoma, decreased in size & stable calcifications, indeterminate right breast mass to 2:00- U/S biopsy recommended, BI-RADS 4A\par -- BX not done given\par \par PET/CT 1/11/23- Compared with PET scan 8/8/2022, there has been interval response to therapy. Residual low-level FDG avidity in lymph nodes in the pelvis is similar to blood pool. No new lesions. \par \par Started maintenance Keytruda 2/2023 (w/ Dr. Charles) along with Lupron and Anastrazole w/ Dr. Perrin\par \par CT chest/abd/pelvis 3/2023: [facility] \par \par PLAN:\par No role for breast surgery at this time\par RTO 3 months \par B/L mammo/sono August 2023\par continue Keytruda and Arimidex\par \par \par

## 2023-07-17 NOTE — HISTORY OF PRESENT ILLNESS
[de-identified] : Ms. MARISOL OROSCO is a 53 year old woman, primarily Eritrean speaking, who presents today for an follow up breast cancer.\par Accompanied by her daughter, Tammy, who the patient prefers to translate for her. \par \par B/L mammo 7/26/22 ([facility]) with benign findings to left breast (BIRADS 2) and right breast shows a suspicious but probably benign lesion (BIRADS 3).\par \par She returned to [place] from the [facility] to establish care after having a biopsy in [facility] that was c/w cervical cancer. She established care with Dr. Radha Holm who repeated a cervical biopsy (path shows high grade squamous intraepithelial lesion to cervix, fragments of SCC w/ extensive tumor necrosis to endocervix) Dr. Holm also ordered PET/CT & further breast imaging \par \par PET/CT 8/18/22 shows large area of intense activity w/in the uterine cavity corresponding to known uterine malignancy, nonspecific focal FDG activity in vaginal region, concerning for disease involvement. FDG avid retroperitoneal & B/L pelvic lymphadenopathy, c/w metastases from uterus. FDG avid right breast nodule likely malignancy. FDG avid right retropectoral, right axillary and right internal mammary nodes, suspicious for metastasis from breast malignancy. Mildly FDG avid prevascular and left retropectoral lymph nodes are indeterminate. Nonspecific approx symmetric FDG avidity in b/l palatine tonsils, probably inflammatory. Nonspecific generalized bone marrow hypermetabolism w/o CT correlate, probably reactive and may be related to anemia.\par \par right breast diagnostic mammo/ bilateral sono to follow-up on [facility] abnormal imaging, done on 8/19/2022. \par US showed suspicious mass to right breast 10:00 4 cm fn, US biopsy recommended, highly suspicious for malignancy, BI-RADS 5.\par \par 8/23/22, right breast US biopsy, 10:00 4 cm fn, path: moderately to poorly differentiated invasive ductal carcinoma with mucinous and micropapillary features, 1.4 cm, intermediate grade DCIS, cribriform pattern, extensive lymphovascular invasion present, microcalcifications present in malignant and benign epithelium. ER+/MD+/HER2 negative by FISH\par \par 8/26/22- right axillary LN metastatic adenocarcinoma c/w breast origin \par 8/26/22- left supraclavicular LN positive for metastatic SCC c/w stage IV cervical cancer\par \par CT chest/abd/pelvis 11/15/22: resolved previously seen lymphadenopathy. No evidence for residual right breast mass however dedicated right breast imaging or PET/CT would be more sensitive. Decreased overall size of the cervical mass which would be best assessed by pelvic MRI. \par \par Family history of father with stomach cancer. \par \par B/L mammo/sono 1/10/23 showed biopsy proven right breast (10:00) carcinoma, decreased in size & stable calcifications, indeterminate right breast mass to 2:00- U/S biopsy recommended, BI-RADS 4A\par \par completed 6 cycles of chemo for cervical cancer 1/2023 \par \par PET/CT 1/11/23- Compared with PET scan 8/8/2022, there has been interval response to therapy. Residual low-level FDG avidity in lymph nodes in the pelvis is similar to blood pool. No new lesions. FDG-avidity in the soft tissues around the Mediport removal, query soft tissue infection.\par \par CT chest/abd/pelvis 3/20/23: ROYA\par \par Started maintenance Keytruda 2/2023 (w/ Dr. Charles) along with Lupron and Anastrazole w/ Dr. Perrin \par

## 2023-08-04 ENCOUNTER — RESULT REVIEW (OUTPATIENT)
Age: 53
End: 2023-08-04

## 2023-08-07 ENCOUNTER — APPOINTMENT (OUTPATIENT)
Dept: CT IMAGING | Facility: CLINIC | Age: 53
End: 2023-08-07
Payer: MEDICAID

## 2023-08-07 ENCOUNTER — OUTPATIENT (OUTPATIENT)
Dept: OUTPATIENT SERVICES | Facility: HOSPITAL | Age: 53
LOS: 1 days | End: 2023-08-07
Payer: MEDICAID

## 2023-08-07 DIAGNOSIS — C53.9 MALIGNANT NEOPLASM OF CERVIX UTERI, UNSPECIFIED: ICD-10-CM

## 2023-08-07 PROCEDURE — 74177 CT ABD & PELVIS W/CONTRAST: CPT | Mod: 26

## 2023-08-07 PROCEDURE — 71260 CT THORAX DX C+: CPT | Mod: 26

## 2023-08-07 PROCEDURE — 71260 CT THORAX DX C+: CPT

## 2023-08-07 PROCEDURE — 74177 CT ABD & PELVIS W/CONTRAST: CPT

## 2023-08-08 ENCOUNTER — OUTPATIENT (OUTPATIENT)
Dept: OUTPATIENT SERVICES | Facility: HOSPITAL | Age: 53
LOS: 1 days | Discharge: HOME | End: 2023-08-08

## 2023-08-08 DIAGNOSIS — C53.9 MALIGNANT NEOPLASM OF CERVIX UTERI, UNSPECIFIED: ICD-10-CM

## 2023-08-14 ENCOUNTER — OUTPATIENT (OUTPATIENT)
Dept: OUTPATIENT SERVICES | Facility: HOSPITAL | Age: 53
LOS: 1 days | End: 2023-08-14
Payer: MEDICAID

## 2023-08-14 ENCOUNTER — RESULT REVIEW (OUTPATIENT)
Age: 53
End: 2023-08-14

## 2023-08-14 ENCOUNTER — APPOINTMENT (OUTPATIENT)
Dept: MAMMOGRAPHY | Facility: CLINIC | Age: 53
End: 2023-08-14
Payer: MEDICAID

## 2023-08-14 ENCOUNTER — APPOINTMENT (OUTPATIENT)
Dept: ULTRASOUND IMAGING | Facility: CLINIC | Age: 53
End: 2023-08-14
Payer: MEDICAID

## 2023-08-14 DIAGNOSIS — C50.911 MALIGNANT NEOPLASM OF UNSPECIFIED SITE OF RIGHT FEMALE BREAST: ICD-10-CM

## 2023-08-14 PROCEDURE — G0279: CPT

## 2023-08-14 PROCEDURE — 77062 BREAST TOMOSYNTHESIS BI: CPT | Mod: 26

## 2023-08-14 PROCEDURE — 76641 ULTRASOUND BREAST COMPLETE: CPT | Mod: 26,50

## 2023-08-14 PROCEDURE — 77066 DX MAMMO INCL CAD BI: CPT | Mod: 26

## 2023-08-14 PROCEDURE — 77066 DX MAMMO INCL CAD BI: CPT

## 2023-08-14 PROCEDURE — 76641 ULTRASOUND BREAST COMPLETE: CPT

## 2023-08-16 ENCOUNTER — RESULT REVIEW (OUTPATIENT)
Age: 53
End: 2023-08-16

## 2023-08-16 ENCOUNTER — APPOINTMENT (OUTPATIENT)
Dept: HEMATOLOGY ONCOLOGY | Facility: CLINIC | Age: 53
End: 2023-08-16
Payer: MEDICAID

## 2023-08-16 ENCOUNTER — APPOINTMENT (OUTPATIENT)
Dept: INFUSION THERAPY | Facility: HOSPITAL | Age: 53
End: 2023-08-16

## 2023-08-16 DIAGNOSIS — R11.2 NAUSEA WITH VOMITING, UNSPECIFIED: ICD-10-CM

## 2023-08-16 DIAGNOSIS — Z51.11 ENCOUNTER FOR ANTINEOPLASTIC CHEMOTHERAPY: ICD-10-CM

## 2023-08-16 LAB
ALBUMIN SERPL ELPH-MCNC: 4.7 G/DL — SIGNIFICANT CHANGE UP (ref 3.3–5)
ALP SERPL-CCNC: 75 U/L — SIGNIFICANT CHANGE UP (ref 40–120)
ALT FLD-CCNC: 21 U/L — SIGNIFICANT CHANGE UP (ref 10–45)
ANION GAP SERPL CALC-SCNC: 12 MMOL/L — SIGNIFICANT CHANGE UP (ref 5–17)
AST SERPL-CCNC: 35 U/L — SIGNIFICANT CHANGE UP (ref 10–40)
BASOPHILS # BLD AUTO: 0.04 K/UL — SIGNIFICANT CHANGE UP (ref 0–0.2)
BASOPHILS NFR BLD AUTO: 0.8 % — SIGNIFICANT CHANGE UP (ref 0–2)
BILIRUB SERPL-MCNC: 0.3 MG/DL — SIGNIFICANT CHANGE UP (ref 0.2–1.2)
BUN SERPL-MCNC: 15 MG/DL — SIGNIFICANT CHANGE UP (ref 7–23)
CALCIUM SERPL-MCNC: 10.4 MG/DL — SIGNIFICANT CHANGE UP (ref 8.4–10.5)
CANCER AG125 SERPL-ACNC: 15 U/ML — SIGNIFICANT CHANGE UP
CHLORIDE SERPL-SCNC: 101 MMOL/L — SIGNIFICANT CHANGE UP (ref 96–108)
CO2 SERPL-SCNC: 25 MMOL/L — SIGNIFICANT CHANGE UP (ref 22–31)
CREAT SERPL-MCNC: 0.81 MG/DL — SIGNIFICANT CHANGE UP (ref 0.5–1.3)
EGFR: 87 ML/MIN/1.73M2 — SIGNIFICANT CHANGE UP
EOSINOPHIL # BLD AUTO: 0.1 K/UL — SIGNIFICANT CHANGE UP (ref 0–0.5)
EOSINOPHIL NFR BLD AUTO: 1.9 % — SIGNIFICANT CHANGE UP (ref 0–6)
GLUCOSE SERPL-MCNC: 72 MG/DL — SIGNIFICANT CHANGE UP (ref 70–99)
HCT VFR BLD CALC: 34.7 % — SIGNIFICANT CHANGE UP (ref 34.5–45)
HGB BLD-MCNC: 11.3 G/DL — LOW (ref 11.5–15.5)
IMM GRANULOCYTES NFR BLD AUTO: 0.2 % — SIGNIFICANT CHANGE UP (ref 0–0.9)
LYMPHOCYTES # BLD AUTO: 1.42 K/UL — SIGNIFICANT CHANGE UP (ref 1–3.3)
LYMPHOCYTES # BLD AUTO: 27.4 % — SIGNIFICANT CHANGE UP (ref 13–44)
MCHC RBC-ENTMCNC: 25.6 PG — LOW (ref 27–34)
MCHC RBC-ENTMCNC: 32.6 G/DL — SIGNIFICANT CHANGE UP (ref 32–36)
MCV RBC AUTO: 78.5 FL — LOW (ref 80–100)
MONOCYTES # BLD AUTO: 0.6 K/UL — SIGNIFICANT CHANGE UP (ref 0–0.9)
MONOCYTES NFR BLD AUTO: 11.6 % — SIGNIFICANT CHANGE UP (ref 2–14)
NEUTROPHILS # BLD AUTO: 3.02 K/UL — SIGNIFICANT CHANGE UP (ref 1.8–7.4)
NEUTROPHILS NFR BLD AUTO: 58.1 % — SIGNIFICANT CHANGE UP (ref 43–77)
NRBC # BLD: 0 /100 WBCS — SIGNIFICANT CHANGE UP (ref 0–0)
PLATELET # BLD AUTO: 258 K/UL — SIGNIFICANT CHANGE UP (ref 150–400)
POTASSIUM SERPL-MCNC: 4.5 MMOL/L — SIGNIFICANT CHANGE UP (ref 3.5–5.3)
POTASSIUM SERPL-SCNC: 4.5 MMOL/L — SIGNIFICANT CHANGE UP (ref 3.5–5.3)
PROT SERPL-MCNC: 8 G/DL — SIGNIFICANT CHANGE UP (ref 6–8.3)
RBC # BLD: 4.42 M/UL — SIGNIFICANT CHANGE UP (ref 3.8–5.2)
RBC # FLD: 14.6 % — HIGH (ref 10.3–14.5)
SODIUM SERPL-SCNC: 138 MMOL/L — SIGNIFICANT CHANGE UP (ref 135–145)
TSH SERPL-MCNC: 3.09 UIU/ML — SIGNIFICANT CHANGE UP (ref 0.27–4.2)
WBC # BLD: 5.19 K/UL — SIGNIFICANT CHANGE UP (ref 3.8–10.5)
WBC # FLD AUTO: 5.19 K/UL — SIGNIFICANT CHANGE UP (ref 3.8–10.5)

## 2023-08-16 PROCEDURE — 99214 OFFICE O/P EST MOD 30 MIN: CPT

## 2023-08-17 NOTE — HISTORY OF PRESENT ILLNESS
[Disease: _____________________] : Disease: [unfilled] [de-identified] : 52 y.o female with newly diagnosed with cervical cancer in [facility].\par  \par  \par  \par  Ms. Sharma, 52 years old, accompanied by her daughter, Tammy and is referred by Dr. Charles, for HSIL (7/20/22 from [facility]) cervical mass, thickened endometrium noted on CT (from [facility]). 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 \par  \par  Pt is without symptoms; she has not seen a GYN in over 10 years and recalls never having an abnormal pap smear. She returned home to the [facility] to help her mother with medical problems and decided to have a GYN checkup at that time. Pap smear returned HSIL / +HPV. Pathology noted SCC moderately differentiated and CT scan identified cervical mass; thickened lining of the uterus; lymphadenopathy as well as a right breast mass and right axillary lymphadenopathy. \par  \par  She denies f/c/n/v/d/abnormal vaginal bleeding, changes to bowel or bladder habits. Denies unintentional weight loss or gain. Denies post-coital or intercycle vaginal bleeding; denies abdominal pain, distention, bloating, back pain or any other associated symptoms. \par  \par  Imaging:\par  7/22/20 ([facility], [facility])\par  Breast: 1.6 x 1.7 cm tuberous, polycyclic contour, accumulating contrast medium, visualized at the borderline of the lower quadrants of the right breast, with no infiltration of the thoracic muscles. \par  Right axillary region: 1.4 x 1.0 cm; 1.0 x 1.0 cm and 0.8 x 1.1 cm. lymphadenopathy. \par  Liver: well defined even borders, the liver parenchyma is with signs of fat rearrangement, a hypdense inclusion 1.6 x 2.1 cm in size, is subcapsularly visualized Segment 8 of the liver. It lacunarly accumulates contrast medium, additionally accumulates contrast medium during the delayed phase. The intrahepatic and extrahepatic bile ducts are not dilated. \par  Abdominal lymph nodes: paraaortic and interaortocaval lymph nodes are sized 10-12 mm, weakly accumulate contrast medium. \par  No free fluid in the abdominal cavity. \par  Uterus: 5.9 x 7.5 cm. \par  EM: 15 mm with dense fluid visualized, mildly heterogeneous. \par  Cervix: tissue lesion 56 x 42 mm invading the uterine isthmus and body. The parametria are infiltrated. \par  Uterine appendages are normally positioned, a cystic inclusion of 3.3 x 3.5 cm is visualized in the projection of the left ovary. \par  Right Ovary: 2.0 x 2.8 cm. \par  Lymph Nodes:\par  - right common iliac lymph node: 1.1 x 1.5 cm.\par  - right pelvic wall node: 1.8 x 3.1 cm\par  - left pelvic wall node: 2.4 x 3.0 cm. \par  - right external iliac lymph node: 1.8 x 1.6 cm \par  \par  She was seen by Dr. Holm and had a biospy.\par  Pathology: Squamous cell cancer.\par  Patient had right breast biopsy yesterday, pending results.\par  \par  SH: No smoking or drinking. 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 \par  Menarche: 12\par  Menopause: still menstruating. LMP 7/30/22\par  \par  She completed C6 of chemotherapy for cervical cancer on 1/4/23 with Cisplatin/Taxol/Bevacizumab/Pembrolizumab. Myesha d/c after 3 cycles due to mediport dehiscence. PET scan showed a good response to treatment and she is now going to start maintenance pembrolizumab.\par  \par  Pet scan shows very good response in breast and lymph nodes from chemotherapy     (Cis/Taxol/Keytruda). Patient sees Dr. Perrin and Dr. Acosta for stage IIIC breast cancer.\par      Given Stage IV cervical cancer there is no indication for doing breast surgery at this time, as per the breast team. Patient started on Lupron and anastrozole. \par  \par  \par  \par  \par   [Treatment Protocol] : Treatment Protocol [FreeTextEntry1] : Keytruda [de-identified] : Patient here for  f/u visit. She feels well. Denies any pain, appetite is normal. Denies any bowel or bladder issues.

## 2023-09-06 ENCOUNTER — APPOINTMENT (OUTPATIENT)
Dept: GYNECOLOGIC ONCOLOGY | Facility: CLINIC | Age: 53
End: 2023-09-06
Payer: MEDICAID

## 2023-09-06 VITALS
DIASTOLIC BLOOD PRESSURE: 85 MMHG | HEART RATE: 75 BPM | BODY MASS INDEX: 28.32 KG/M2 | WEIGHT: 150 LBS | SYSTOLIC BLOOD PRESSURE: 132 MMHG | HEIGHT: 61 IN

## 2023-09-06 PROCEDURE — 99214 OFFICE O/P EST MOD 30 MIN: CPT

## 2023-09-06 NOTE — DISCUSSION/SUMMARY
[FreeTextEntry1] : Patient is doing well, clinically stable cervical cancer, no evidence of active disease, recent CAT scan concerning for one lymph node that is slightly incidental enlarged.  Will reevaluate with a PET scan at a 3-month interval. continue maintenance pembro f/u 3 months continue hormonal tx for breast CA, on anastrazole and lupron q3 mo patient is in good spirits, most of her family in [facility], just visited [place] all questions answered

## 2023-09-06 NOTE — HISTORY OF PRESENT ILLNESS
[FreeTextEntry1] : Med Onc/ Ref:  Dr. Rain Charles PCP:  Dr. Heather Kumari Breast Med Onc:  Dr. Kaye Perrin Breast Surgeon:  Dr. Acosta.   Ms. Sharma, 53 years old, accompanied by her daughter, Tammy and is referred by Dr. Charles, presented with Stage IV cervical cancer 8/2022 and metastatic breast cancer. Patient completed Cis/Taxol/Avastin, Pembro, and now on Pembro maintenance. Continues on Tamoxifen and lupron. Overall feeling better, tolerating treatment. no bleeding.  Labs: 8/16/2023:  = 15 5/26/23:   = 15  Imaging: PET/CT 1/11/23 1. Compared with PET scan 8/8/2022, there has been interval response to therapy. Residual low-level FDG avidity in lymph nodes in the pelvis is similar to blood pool. No new lesions. 2. FDG-avidity in the soft tissues around the Mediport removal, query soft tissue infection.  CT C/A/P 3/20/2023 No evidence of disease. Previously noted pelvic nodes have normalized.  Current Treatment:  Cisplatin/Taxol/Myesha/Pembro C1 = 9/14/22 C2 = 10/5/22 C3 = 10/26/22 C4 - 11/16/22 C5 - 12/14/22 C6 - 1/4/23.  Now on Lupron and anastrazole.   Pembro maintenance now  Health Maintenance: BMI: 27 Lifestyle:  sexually active.   COVID vaccine received:  yes Mammogram: 8/14/23:  BIRADS 4a - monitored by Dr Acosta Colonoscopy:none PAP: 7/20/2022:  +HPV 16; HSIL

## 2023-09-06 NOTE — PHYSICAL EXAM
[Chaperone Present] : A chaperone was present in the examining room during all aspects of the physical examination [Normal] : Recto-Vaginal Exam: Normal [Fully active, able to carry on all pre-disease performance without restriction] : Status 0 - Fully active, able to carry on all pre-disease performance without restriction [de-identified] : no clear mass visualized, resolved, free parametria b/l [de-identified] : not palpable [de-identified] : smooth, free b/l [de-identified] : No culdesac nodules

## 2023-09-15 ENCOUNTER — APPOINTMENT (OUTPATIENT)
Dept: RADIOLOGY | Facility: CLINIC | Age: 53
End: 2023-09-15
Payer: MEDICAID

## 2023-09-15 ENCOUNTER — OUTPATIENT (OUTPATIENT)
Dept: OUTPATIENT SERVICES | Facility: HOSPITAL | Age: 53
LOS: 1 days | End: 2023-09-15
Payer: MEDICAID

## 2023-09-15 DIAGNOSIS — Z00.8 ENCOUNTER FOR OTHER GENERAL EXAMINATION: ICD-10-CM

## 2023-09-15 PROCEDURE — 77085 DXA BONE DENSITY AXL VRT FX: CPT | Mod: 26

## 2023-09-15 PROCEDURE — 77085 DXA BONE DENSITY AXL VRT FX: CPT

## 2023-09-18 NOTE — ASU PATIENT PROFILE, ADULT - NS PRO TALK SOMEONE YN
no Incidental Superficial Basal Cell Carcinoma Histology Text: One or more foci of “budding” basaloid tumor with peripheral palisading is attached superficially to the epidermis and/or adjacent superficial hair follicle(s) and is surrounded by a myxoid stroma.

## 2023-09-27 ENCOUNTER — RESULT REVIEW (OUTPATIENT)
Age: 53
End: 2023-09-27

## 2023-09-27 ENCOUNTER — APPOINTMENT (OUTPATIENT)
Dept: HEMATOLOGY ONCOLOGY | Facility: CLINIC | Age: 53
End: 2023-09-27
Payer: MEDICAID

## 2023-09-27 ENCOUNTER — APPOINTMENT (OUTPATIENT)
Dept: INFUSION THERAPY | Facility: HOSPITAL | Age: 53
End: 2023-09-27

## 2023-09-27 VITALS
DIASTOLIC BLOOD PRESSURE: 83 MMHG | OXYGEN SATURATION: 100 % | TEMPERATURE: 97 F | HEART RATE: 68 BPM | RESPIRATION RATE: 15 BRPM | WEIGHT: 154.32 LBS | BODY MASS INDEX: 29.16 KG/M2 | SYSTOLIC BLOOD PRESSURE: 134 MMHG

## 2023-09-27 LAB
ALBUMIN SERPL ELPH-MCNC: 4.4 G/DL — SIGNIFICANT CHANGE UP (ref 3.3–5)
ALP SERPL-CCNC: 88 U/L — SIGNIFICANT CHANGE UP (ref 40–120)
ALT FLD-CCNC: 37 U/L — SIGNIFICANT CHANGE UP (ref 10–45)
ANION GAP SERPL CALC-SCNC: 11 MMOL/L — SIGNIFICANT CHANGE UP (ref 5–17)
AST SERPL-CCNC: 36 U/L — SIGNIFICANT CHANGE UP (ref 10–40)
BASOPHILS # BLD AUTO: 0.04 K/UL — SIGNIFICANT CHANGE UP (ref 0–0.2)
BASOPHILS NFR BLD AUTO: 0.9 % — SIGNIFICANT CHANGE UP (ref 0–2)
BILIRUB SERPL-MCNC: 0.3 MG/DL — SIGNIFICANT CHANGE UP (ref 0.2–1.2)
BUN SERPL-MCNC: 15 MG/DL — SIGNIFICANT CHANGE UP (ref 7–23)
CALCIUM SERPL-MCNC: 10.1 MG/DL — SIGNIFICANT CHANGE UP (ref 8.4–10.5)
CANCER AG125 SERPL-ACNC: 16 U/ML — SIGNIFICANT CHANGE UP
CHLORIDE SERPL-SCNC: 104 MMOL/L — SIGNIFICANT CHANGE UP (ref 96–108)
CO2 SERPL-SCNC: 26 MMOL/L — SIGNIFICANT CHANGE UP (ref 22–31)
CREAT SERPL-MCNC: 0.81 MG/DL — SIGNIFICANT CHANGE UP (ref 0.5–1.3)
EGFR: 87 ML/MIN/1.73M2 — SIGNIFICANT CHANGE UP
EOSINOPHIL # BLD AUTO: 0.11 K/UL — SIGNIFICANT CHANGE UP (ref 0–0.5)
EOSINOPHIL NFR BLD AUTO: 2.4 % — SIGNIFICANT CHANGE UP (ref 0–6)
GLUCOSE SERPL-MCNC: 79 MG/DL — SIGNIFICANT CHANGE UP (ref 70–99)
HCT VFR BLD CALC: 35.4 % — SIGNIFICANT CHANGE UP (ref 34.5–45)
HGB BLD-MCNC: 11.3 G/DL — LOW (ref 11.5–15.5)
IMM GRANULOCYTES NFR BLD AUTO: 0.4 % — SIGNIFICANT CHANGE UP (ref 0–0.9)
LYMPHOCYTES # BLD AUTO: 1.43 K/UL — SIGNIFICANT CHANGE UP (ref 1–3.3)
LYMPHOCYTES # BLD AUTO: 30.9 % — SIGNIFICANT CHANGE UP (ref 13–44)
MCHC RBC-ENTMCNC: 24.9 PG — LOW (ref 27–34)
MCHC RBC-ENTMCNC: 31.9 G/DL — LOW (ref 32–36)
MCV RBC AUTO: 78 FL — LOW (ref 80–100)
MONOCYTES # BLD AUTO: 0.5 K/UL — SIGNIFICANT CHANGE UP (ref 0–0.9)
MONOCYTES NFR BLD AUTO: 10.8 % — SIGNIFICANT CHANGE UP (ref 2–14)
NEUTROPHILS # BLD AUTO: 2.53 K/UL — SIGNIFICANT CHANGE UP (ref 1.8–7.4)
NEUTROPHILS NFR BLD AUTO: 54.6 % — SIGNIFICANT CHANGE UP (ref 43–77)
NRBC # BLD: 0 /100 WBCS — SIGNIFICANT CHANGE UP (ref 0–0)
PLATELET # BLD AUTO: 242 K/UL — SIGNIFICANT CHANGE UP (ref 150–400)
POTASSIUM SERPL-MCNC: 4.4 MMOL/L — SIGNIFICANT CHANGE UP (ref 3.5–5.3)
POTASSIUM SERPL-SCNC: 4.4 MMOL/L — SIGNIFICANT CHANGE UP (ref 3.5–5.3)
PROT SERPL-MCNC: 8 G/DL — SIGNIFICANT CHANGE UP (ref 6–8.3)
RBC # BLD: 4.54 M/UL — SIGNIFICANT CHANGE UP (ref 3.8–5.2)
RBC # FLD: 14.6 % — HIGH (ref 10.3–14.5)
SODIUM SERPL-SCNC: 141 MMOL/L — SIGNIFICANT CHANGE UP (ref 135–145)
TSH SERPL-MCNC: 2.41 UIU/ML — SIGNIFICANT CHANGE UP (ref 0.27–4.2)
WBC # BLD: 4.63 K/UL — SIGNIFICANT CHANGE UP (ref 3.8–10.5)
WBC # FLD AUTO: 4.63 K/UL — SIGNIFICANT CHANGE UP (ref 3.8–10.5)

## 2023-09-27 PROCEDURE — 99214 OFFICE O/P EST MOD 30 MIN: CPT

## 2023-10-16 ENCOUNTER — RESULT REVIEW (OUTPATIENT)
Age: 53
End: 2023-10-16

## 2023-10-23 ENCOUNTER — APPOINTMENT (OUTPATIENT)
Dept: SURGICAL ONCOLOGY | Facility: CLINIC | Age: 53
End: 2023-10-23
Payer: MEDICAID

## 2023-10-23 VITALS
DIASTOLIC BLOOD PRESSURE: 72 MMHG | OXYGEN SATURATION: 98 % | RESPIRATION RATE: 16 BRPM | HEART RATE: 75 BPM | SYSTOLIC BLOOD PRESSURE: 106 MMHG | HEIGHT: 61 IN

## 2023-10-23 PROCEDURE — 99214 OFFICE O/P EST MOD 30 MIN: CPT

## 2023-11-01 ENCOUNTER — OUTPATIENT (OUTPATIENT)
Dept: OUTPATIENT SERVICES | Facility: HOSPITAL | Age: 53
LOS: 1 days | Discharge: HOME | End: 2023-11-01

## 2023-11-01 DIAGNOSIS — C53.9 MALIGNANT NEOPLASM OF CERVIX UTERI, UNSPECIFIED: ICD-10-CM

## 2023-11-08 ENCOUNTER — RESULT REVIEW (OUTPATIENT)
Age: 53
End: 2023-11-08

## 2023-11-08 ENCOUNTER — APPOINTMENT (OUTPATIENT)
Dept: HEMATOLOGY ONCOLOGY | Facility: CLINIC | Age: 53
End: 2023-11-08
Payer: MEDICAID

## 2023-11-08 ENCOUNTER — APPOINTMENT (OUTPATIENT)
Dept: INFUSION THERAPY | Facility: HOSPITAL | Age: 53
End: 2023-11-08

## 2023-11-08 LAB
ALBUMIN SERPL ELPH-MCNC: 4.5 G/DL — SIGNIFICANT CHANGE UP (ref 3.3–5)
ALBUMIN SERPL ELPH-MCNC: 4.5 G/DL — SIGNIFICANT CHANGE UP (ref 3.3–5)
ALP SERPL-CCNC: 94 U/L — SIGNIFICANT CHANGE UP (ref 40–120)
ALP SERPL-CCNC: 94 U/L — SIGNIFICANT CHANGE UP (ref 40–120)
ALT FLD-CCNC: 23 U/L — SIGNIFICANT CHANGE UP (ref 10–45)
ALT FLD-CCNC: 23 U/L — SIGNIFICANT CHANGE UP (ref 10–45)
ANION GAP SERPL CALC-SCNC: 11 MMOL/L — SIGNIFICANT CHANGE UP (ref 5–17)
ANION GAP SERPL CALC-SCNC: 11 MMOL/L — SIGNIFICANT CHANGE UP (ref 5–17)
AST SERPL-CCNC: 33 U/L — SIGNIFICANT CHANGE UP (ref 10–40)
AST SERPL-CCNC: 33 U/L — SIGNIFICANT CHANGE UP (ref 10–40)
BASOPHILS # BLD AUTO: 0.04 K/UL — SIGNIFICANT CHANGE UP (ref 0–0.2)
BASOPHILS # BLD AUTO: 0.04 K/UL — SIGNIFICANT CHANGE UP (ref 0–0.2)
BASOPHILS NFR BLD AUTO: 0.9 % — SIGNIFICANT CHANGE UP (ref 0–2)
BASOPHILS NFR BLD AUTO: 0.9 % — SIGNIFICANT CHANGE UP (ref 0–2)
BILIRUB SERPL-MCNC: 0.2 MG/DL — SIGNIFICANT CHANGE UP (ref 0.2–1.2)
BILIRUB SERPL-MCNC: 0.2 MG/DL — SIGNIFICANT CHANGE UP (ref 0.2–1.2)
BUN SERPL-MCNC: 21 MG/DL — SIGNIFICANT CHANGE UP (ref 7–23)
BUN SERPL-MCNC: 21 MG/DL — SIGNIFICANT CHANGE UP (ref 7–23)
CALCIUM SERPL-MCNC: 10.2 MG/DL — SIGNIFICANT CHANGE UP (ref 8.4–10.5)
CALCIUM SERPL-MCNC: 10.2 MG/DL — SIGNIFICANT CHANGE UP (ref 8.4–10.5)
CANCER AG125 SERPL-ACNC: 17 U/ML — SIGNIFICANT CHANGE UP
CANCER AG125 SERPL-ACNC: 17 U/ML — SIGNIFICANT CHANGE UP
CHLORIDE SERPL-SCNC: 101 MMOL/L — SIGNIFICANT CHANGE UP (ref 96–108)
CHLORIDE SERPL-SCNC: 101 MMOL/L — SIGNIFICANT CHANGE UP (ref 96–108)
CO2 SERPL-SCNC: 26 MMOL/L — SIGNIFICANT CHANGE UP (ref 22–31)
CO2 SERPL-SCNC: 26 MMOL/L — SIGNIFICANT CHANGE UP (ref 22–31)
CREAT SERPL-MCNC: 0.84 MG/DL — SIGNIFICANT CHANGE UP (ref 0.5–1.3)
CREAT SERPL-MCNC: 0.84 MG/DL — SIGNIFICANT CHANGE UP (ref 0.5–1.3)
EGFR: 83 ML/MIN/1.73M2 — SIGNIFICANT CHANGE UP
EGFR: 83 ML/MIN/1.73M2 — SIGNIFICANT CHANGE UP
EOSINOPHIL # BLD AUTO: 0.12 K/UL — SIGNIFICANT CHANGE UP (ref 0–0.5)
EOSINOPHIL # BLD AUTO: 0.12 K/UL — SIGNIFICANT CHANGE UP (ref 0–0.5)
EOSINOPHIL NFR BLD AUTO: 2.7 % — SIGNIFICANT CHANGE UP (ref 0–6)
EOSINOPHIL NFR BLD AUTO: 2.7 % — SIGNIFICANT CHANGE UP (ref 0–6)
GLUCOSE SERPL-MCNC: 91 MG/DL — SIGNIFICANT CHANGE UP (ref 70–99)
GLUCOSE SERPL-MCNC: 91 MG/DL — SIGNIFICANT CHANGE UP (ref 70–99)
HCT VFR BLD CALC: 35 % — SIGNIFICANT CHANGE UP (ref 34.5–45)
HCT VFR BLD CALC: 35 % — SIGNIFICANT CHANGE UP (ref 34.5–45)
HGB BLD-MCNC: 11.2 G/DL — LOW (ref 11.5–15.5)
HGB BLD-MCNC: 11.2 G/DL — LOW (ref 11.5–15.5)
IMM GRANULOCYTES NFR BLD AUTO: 0 % — SIGNIFICANT CHANGE UP (ref 0–0.9)
IMM GRANULOCYTES NFR BLD AUTO: 0 % — SIGNIFICANT CHANGE UP (ref 0–0.9)
LYMPHOCYTES # BLD AUTO: 1.44 K/UL — SIGNIFICANT CHANGE UP (ref 1–3.3)
LYMPHOCYTES # BLD AUTO: 1.44 K/UL — SIGNIFICANT CHANGE UP (ref 1–3.3)
LYMPHOCYTES # BLD AUTO: 32 % — SIGNIFICANT CHANGE UP (ref 13–44)
LYMPHOCYTES # BLD AUTO: 32 % — SIGNIFICANT CHANGE UP (ref 13–44)
MCHC RBC-ENTMCNC: 24.9 PG — LOW (ref 27–34)
MCHC RBC-ENTMCNC: 24.9 PG — LOW (ref 27–34)
MCHC RBC-ENTMCNC: 32 G/DL — SIGNIFICANT CHANGE UP (ref 32–36)
MCHC RBC-ENTMCNC: 32 G/DL — SIGNIFICANT CHANGE UP (ref 32–36)
MCV RBC AUTO: 78 FL — LOW (ref 80–100)
MCV RBC AUTO: 78 FL — LOW (ref 80–100)
MONOCYTES # BLD AUTO: 0.47 K/UL — SIGNIFICANT CHANGE UP (ref 0–0.9)
MONOCYTES # BLD AUTO: 0.47 K/UL — SIGNIFICANT CHANGE UP (ref 0–0.9)
MONOCYTES NFR BLD AUTO: 10.4 % — SIGNIFICANT CHANGE UP (ref 2–14)
MONOCYTES NFR BLD AUTO: 10.4 % — SIGNIFICANT CHANGE UP (ref 2–14)
NEUTROPHILS # BLD AUTO: 2.43 K/UL — SIGNIFICANT CHANGE UP (ref 1.8–7.4)
NEUTROPHILS # BLD AUTO: 2.43 K/UL — SIGNIFICANT CHANGE UP (ref 1.8–7.4)
NEUTROPHILS NFR BLD AUTO: 54 % — SIGNIFICANT CHANGE UP (ref 43–77)
NEUTROPHILS NFR BLD AUTO: 54 % — SIGNIFICANT CHANGE UP (ref 43–77)
NRBC # BLD: 0 /100 WBCS — SIGNIFICANT CHANGE UP (ref 0–0)
NRBC # BLD: 0 /100 WBCS — SIGNIFICANT CHANGE UP (ref 0–0)
PLATELET # BLD AUTO: 259 K/UL — SIGNIFICANT CHANGE UP (ref 150–400)
PLATELET # BLD AUTO: 259 K/UL — SIGNIFICANT CHANGE UP (ref 150–400)
POTASSIUM SERPL-MCNC: 4.7 MMOL/L — SIGNIFICANT CHANGE UP (ref 3.5–5.3)
POTASSIUM SERPL-MCNC: 4.7 MMOL/L — SIGNIFICANT CHANGE UP (ref 3.5–5.3)
POTASSIUM SERPL-SCNC: 4.7 MMOL/L — SIGNIFICANT CHANGE UP (ref 3.5–5.3)
POTASSIUM SERPL-SCNC: 4.7 MMOL/L — SIGNIFICANT CHANGE UP (ref 3.5–5.3)
PROT SERPL-MCNC: 8 G/DL — SIGNIFICANT CHANGE UP (ref 6–8.3)
PROT SERPL-MCNC: 8 G/DL — SIGNIFICANT CHANGE UP (ref 6–8.3)
RBC # BLD: 4.49 M/UL — SIGNIFICANT CHANGE UP (ref 3.8–5.2)
RBC # BLD: 4.49 M/UL — SIGNIFICANT CHANGE UP (ref 3.8–5.2)
RBC # FLD: 15 % — HIGH (ref 10.3–14.5)
RBC # FLD: 15 % — HIGH (ref 10.3–14.5)
SODIUM SERPL-SCNC: 138 MMOL/L — SIGNIFICANT CHANGE UP (ref 135–145)
SODIUM SERPL-SCNC: 138 MMOL/L — SIGNIFICANT CHANGE UP (ref 135–145)
TSH SERPL-MCNC: 2.58 UIU/ML — SIGNIFICANT CHANGE UP (ref 0.27–4.2)
TSH SERPL-MCNC: 2.58 UIU/ML — SIGNIFICANT CHANGE UP (ref 0.27–4.2)
WBC # BLD: 4.5 K/UL — SIGNIFICANT CHANGE UP (ref 3.8–10.5)
WBC # BLD: 4.5 K/UL — SIGNIFICANT CHANGE UP (ref 3.8–10.5)
WBC # FLD AUTO: 4.5 K/UL — SIGNIFICANT CHANGE UP (ref 3.8–10.5)
WBC # FLD AUTO: 4.5 K/UL — SIGNIFICANT CHANGE UP (ref 3.8–10.5)

## 2023-11-08 PROCEDURE — 99213 OFFICE O/P EST LOW 20 MIN: CPT

## 2023-11-09 DIAGNOSIS — R11.2 NAUSEA WITH VOMITING, UNSPECIFIED: ICD-10-CM

## 2023-11-09 DIAGNOSIS — Z51.11 ENCOUNTER FOR ANTINEOPLASTIC CHEMOTHERAPY: ICD-10-CM

## 2023-11-21 ENCOUNTER — APPOINTMENT (OUTPATIENT)
Dept: CT IMAGING | Facility: CLINIC | Age: 53
End: 2023-11-21
Payer: MEDICAID

## 2023-11-21 ENCOUNTER — OUTPATIENT (OUTPATIENT)
Dept: OUTPATIENT SERVICES | Facility: HOSPITAL | Age: 53
LOS: 1 days | End: 2023-11-21
Payer: MEDICAID

## 2023-11-21 DIAGNOSIS — C53.9 MALIGNANT NEOPLASM OF CERVIX UTERI, UNSPECIFIED: ICD-10-CM

## 2023-11-21 PROCEDURE — 74177 CT ABD & PELVIS W/CONTRAST: CPT | Mod: 26

## 2023-11-21 PROCEDURE — 71260 CT THORAX DX C+: CPT

## 2023-11-21 PROCEDURE — 71260 CT THORAX DX C+: CPT | Mod: 26

## 2023-11-21 PROCEDURE — 74177 CT ABD & PELVIS W/CONTRAST: CPT

## 2023-12-13 ENCOUNTER — APPOINTMENT (OUTPATIENT)
Dept: NUCLEAR MEDICINE | Facility: CLINIC | Age: 53
End: 2023-12-13
Payer: MEDICAID

## 2023-12-13 ENCOUNTER — APPOINTMENT (OUTPATIENT)
Dept: GYNECOLOGIC ONCOLOGY | Facility: CLINIC | Age: 53
End: 2023-12-13
Payer: MEDICAID

## 2023-12-13 ENCOUNTER — OUTPATIENT (OUTPATIENT)
Dept: OUTPATIENT SERVICES | Facility: HOSPITAL | Age: 53
LOS: 1 days | End: 2023-12-13
Payer: MEDICAID

## 2023-12-13 ENCOUNTER — RESULT REVIEW (OUTPATIENT)
Age: 53
End: 2023-12-13

## 2023-12-13 VITALS
WEIGHT: 150 LBS | HEART RATE: 80 BPM | SYSTOLIC BLOOD PRESSURE: 118 MMHG | BODY MASS INDEX: 28.32 KG/M2 | DIASTOLIC BLOOD PRESSURE: 77 MMHG | HEIGHT: 61 IN

## 2023-12-13 DIAGNOSIS — C53.9 MALIGNANT NEOPLASM OF CERVIX UTERI, UNSPECIFIED: ICD-10-CM

## 2023-12-13 PROCEDURE — 99214 OFFICE O/P EST MOD 30 MIN: CPT | Mod: 25

## 2023-12-13 PROCEDURE — 78815 PET IMAGE W/CT SKULL-THIGH: CPT

## 2023-12-13 PROCEDURE — 78815 PET IMAGE W/CT SKULL-THIGH: CPT | Mod: 26,PS

## 2023-12-13 PROCEDURE — 57500 BIOPSY OF CERVIX: CPT | Mod: 59

## 2023-12-13 PROCEDURE — A9552: CPT

## 2023-12-13 NOTE — PHYSICAL EXAM
[de-identified] : cervix is visualized and small area of abnormality noted, bx performed [de-identified] : not palpable [de-identified] : smooth, free b/l [de-identified] : No culdesac nodules

## 2023-12-13 NOTE — DISCUSSION/SUMMARY
[FreeTextEntry1] : Patient is doing well, clinically stable cervical cancerrecent CAT scan concerning for one lymph node that is slightly incidental enlarged.  Will reevaluate with a PET scan - having it done today. ?cervical mass noted, bx performed today. continue maintenance pembro f/u 3 months continue hormonal tx for breast CA, on anastrazole and lupron q3 mo patient is in good spirits, most of her family in [place], just visited [place] all questions answered

## 2023-12-13 NOTE — HISTORY OF PRESENT ILLNESS
[FreeTextEntry1] : Med Onc/ Ref:  Dr. Rain Charles PCP:  Dr. Heather Kumari Breast Med Onc:  Dr. Kaye Perrin Breast Surgeon:  Dr. Acosta.   Ms. Sharma, 53 years old, accompanied by her daughter, Tammy and is referred by Dr. Charles, presented with Stage IV cervical cancer 2022 and metastatic breast cancer. Patient completed Cis/Taxol/Avastin, Pembro, and now on Pembro maintenance. Continues on Tamoxifen and lupron. Overall feeling better, tolerating treatment. no bleeding. Last CT concerning increase in lymph node size. Awaiting PET CT.  Labs: 2023:  = 17 2023:  = 15 23:   = 15  Imagin2023: CT C/A/P ([facility]) LUNGS AND LARGE AIRWAYS: Patent central airways. No pulmonary nodules. PLEURA: No pleural effusion. VESSELS: Within normal limits. HEART: Heart size is normal. No pericardial effusion. MEDIASTINUM AND PANCHO: No lymphadenopathy. CHEST WALL AND LOWER NECK: Within normal limits. ABDOMEN AND PELVIS: LIVER: Stable right posterior hepatic hemangioma. BILE DUCTS: Normal caliber. GALLBLADDER: Within normal limits. SPLEEN: Within normal limits. PANCREAS: Within normal limits. ADRENALS: Within normal limits. KIDNEYS/URETERS: Within normal limits. BLADDER: Within normal limits. REPRODUCTIVE ORGANS: Uterus and adnexa within normal limits. BOWEL: No bowel obstruction. Appendix is normal. PERITONEUM: No ascites. VESSELS: Within normal limits. RETROPERITONEUM/LYMPH NODES: Newly enlarged right external iliac node measures 2.0 x 1.5 cm (4, 175), previously subcentimeter short axis (8 mm). ABDOMINAL WALL: Within normal limits. BONES: Within normal limits. IMPRESSION: New right external iliac adenopathy.  PET/CT 23 1. Compared with PET scan 2022, there has been interval response to therapy. Residual low-level FDG avidity in lymph nodes in the pelvis is similar to blood pool. No new lesions. 2. FDG-avidity in the soft tissues around the Mediport removal, query soft tissue infection.  Current Treatment:  Cisplatin/Taxol/Myesha/Pembro C1 = 22 C2 = 10/5/22 C3 = 10/26/22 C4 - 22 C5 - 22 C6 - 23.  Now on Lupron and anastrazole.   Pembro maintenance now Patient started on Lupron and anastrozole for breast cancer  Health Maintenance: BMI: 27 Lifestyle:  sexually active.   COVID vaccine received:  yes Mammogram: 23:  BIRADS 4a - monitored by Dr Acosta Colonoscopy:none PAP: 2022:  +HPV 16; HSIL

## 2023-12-13 NOTE — PROCEDURE
[Cervical Biopsy] : a cervical biopsy [Patient] : the patient [Written consent] : written consent was obtained prior to the procedure and is detailed in the patient's record [Yes] : the specimen was sent to pathology [No Complications] : none [Tolerated Well] : the patient tolerated the procedure well

## 2023-12-18 ENCOUNTER — OUTPATIENT (OUTPATIENT)
Dept: OUTPATIENT SERVICES | Facility: HOSPITAL | Age: 53
LOS: 1 days | Discharge: HOME | End: 2023-12-18
Payer: MEDICAID

## 2023-12-20 ENCOUNTER — APPOINTMENT (OUTPATIENT)
Dept: INFUSION THERAPY | Facility: HOSPITAL | Age: 53
End: 2023-12-20

## 2023-12-20 ENCOUNTER — RESULT REVIEW (OUTPATIENT)
Age: 53
End: 2023-12-20

## 2023-12-20 ENCOUNTER — APPOINTMENT (OUTPATIENT)
Dept: HEMATOLOGY ONCOLOGY | Facility: CLINIC | Age: 53
End: 2023-12-20
Payer: MEDICAID

## 2023-12-20 ENCOUNTER — NON-APPOINTMENT (OUTPATIENT)
Age: 53
End: 2023-12-20

## 2023-12-20 VITALS
OXYGEN SATURATION: 99 % | RESPIRATION RATE: 17 BRPM | HEART RATE: 60 BPM | WEIGHT: 157.63 LBS | DIASTOLIC BLOOD PRESSURE: 75 MMHG | SYSTOLIC BLOOD PRESSURE: 106 MMHG | TEMPERATURE: 97.2 F | BODY MASS INDEX: 29.76 KG/M2 | HEIGHT: 60.98 IN

## 2023-12-20 LAB
ALBUMIN SERPL ELPH-MCNC: 4.5 G/DL — SIGNIFICANT CHANGE UP (ref 3.3–5)
ALBUMIN SERPL ELPH-MCNC: 4.5 G/DL — SIGNIFICANT CHANGE UP (ref 3.3–5)
ALP SERPL-CCNC: 101 U/L — SIGNIFICANT CHANGE UP (ref 40–120)
ALP SERPL-CCNC: 101 U/L — SIGNIFICANT CHANGE UP (ref 40–120)
ALT FLD-CCNC: 16 U/L — SIGNIFICANT CHANGE UP (ref 10–45)
ALT FLD-CCNC: 16 U/L — SIGNIFICANT CHANGE UP (ref 10–45)
ANION GAP SERPL CALC-SCNC: 11 MMOL/L — SIGNIFICANT CHANGE UP (ref 5–17)
ANION GAP SERPL CALC-SCNC: 11 MMOL/L — SIGNIFICANT CHANGE UP (ref 5–17)
AST SERPL-CCNC: 29 U/L — SIGNIFICANT CHANGE UP (ref 10–40)
AST SERPL-CCNC: 29 U/L — SIGNIFICANT CHANGE UP (ref 10–40)
BASOPHILS # BLD AUTO: 0.04 K/UL — SIGNIFICANT CHANGE UP (ref 0–0.2)
BASOPHILS # BLD AUTO: 0.04 K/UL — SIGNIFICANT CHANGE UP (ref 0–0.2)
BASOPHILS NFR BLD AUTO: 0.8 % — SIGNIFICANT CHANGE UP (ref 0–2)
BASOPHILS NFR BLD AUTO: 0.8 % — SIGNIFICANT CHANGE UP (ref 0–2)
BILIRUB SERPL-MCNC: 0.3 MG/DL — SIGNIFICANT CHANGE UP (ref 0.2–1.2)
BILIRUB SERPL-MCNC: 0.3 MG/DL — SIGNIFICANT CHANGE UP (ref 0.2–1.2)
BUN SERPL-MCNC: 13 MG/DL — SIGNIFICANT CHANGE UP (ref 7–23)
BUN SERPL-MCNC: 13 MG/DL — SIGNIFICANT CHANGE UP (ref 7–23)
CALCIUM SERPL-MCNC: 10.3 MG/DL — SIGNIFICANT CHANGE UP (ref 8.4–10.5)
CALCIUM SERPL-MCNC: 10.3 MG/DL — SIGNIFICANT CHANGE UP (ref 8.4–10.5)
CANCER AG125 SERPL-ACNC: 18 U/ML — SIGNIFICANT CHANGE UP
CANCER AG125 SERPL-ACNC: 18 U/ML — SIGNIFICANT CHANGE UP
CHLORIDE SERPL-SCNC: 100 MMOL/L — SIGNIFICANT CHANGE UP (ref 96–108)
CHLORIDE SERPL-SCNC: 100 MMOL/L — SIGNIFICANT CHANGE UP (ref 96–108)
CO2 SERPL-SCNC: 27 MMOL/L — SIGNIFICANT CHANGE UP (ref 22–31)
CO2 SERPL-SCNC: 27 MMOL/L — SIGNIFICANT CHANGE UP (ref 22–31)
CREAT SERPL-MCNC: 0.84 MG/DL — SIGNIFICANT CHANGE UP (ref 0.5–1.3)
CREAT SERPL-MCNC: 0.84 MG/DL — SIGNIFICANT CHANGE UP (ref 0.5–1.3)
EGFR: 83 ML/MIN/1.73M2 — SIGNIFICANT CHANGE UP
EGFR: 83 ML/MIN/1.73M2 — SIGNIFICANT CHANGE UP
EOSINOPHIL # BLD AUTO: 0.16 K/UL — SIGNIFICANT CHANGE UP (ref 0–0.5)
EOSINOPHIL # BLD AUTO: 0.16 K/UL — SIGNIFICANT CHANGE UP (ref 0–0.5)
EOSINOPHIL NFR BLD AUTO: 3.1 % — SIGNIFICANT CHANGE UP (ref 0–6)
EOSINOPHIL NFR BLD AUTO: 3.1 % — SIGNIFICANT CHANGE UP (ref 0–6)
GLUCOSE SERPL-MCNC: 86 MG/DL — SIGNIFICANT CHANGE UP (ref 70–99)
GLUCOSE SERPL-MCNC: 86 MG/DL — SIGNIFICANT CHANGE UP (ref 70–99)
HCT VFR BLD CALC: 35.6 % — SIGNIFICANT CHANGE UP (ref 34.5–45)
HCT VFR BLD CALC: 35.6 % — SIGNIFICANT CHANGE UP (ref 34.5–45)
HGB BLD-MCNC: 11.5 G/DL — SIGNIFICANT CHANGE UP (ref 11.5–15.5)
HGB BLD-MCNC: 11.5 G/DL — SIGNIFICANT CHANGE UP (ref 11.5–15.5)
IMM GRANULOCYTES NFR BLD AUTO: 0.4 % — SIGNIFICANT CHANGE UP (ref 0–0.9)
IMM GRANULOCYTES NFR BLD AUTO: 0.4 % — SIGNIFICANT CHANGE UP (ref 0–0.9)
LYMPHOCYTES # BLD AUTO: 1.56 K/UL — SIGNIFICANT CHANGE UP (ref 1–3.3)
LYMPHOCYTES # BLD AUTO: 1.56 K/UL — SIGNIFICANT CHANGE UP (ref 1–3.3)
LYMPHOCYTES # BLD AUTO: 29.9 % — SIGNIFICANT CHANGE UP (ref 13–44)
LYMPHOCYTES # BLD AUTO: 29.9 % — SIGNIFICANT CHANGE UP (ref 13–44)
MCHC RBC-ENTMCNC: 25.2 PG — LOW (ref 27–34)
MCHC RBC-ENTMCNC: 25.2 PG — LOW (ref 27–34)
MCHC RBC-ENTMCNC: 32.3 G/DL — SIGNIFICANT CHANGE UP (ref 32–36)
MCHC RBC-ENTMCNC: 32.3 G/DL — SIGNIFICANT CHANGE UP (ref 32–36)
MCV RBC AUTO: 77.9 FL — LOW (ref 80–100)
MCV RBC AUTO: 77.9 FL — LOW (ref 80–100)
MONOCYTES # BLD AUTO: 0.52 K/UL — SIGNIFICANT CHANGE UP (ref 0–0.9)
MONOCYTES # BLD AUTO: 0.52 K/UL — SIGNIFICANT CHANGE UP (ref 0–0.9)
MONOCYTES NFR BLD AUTO: 10 % — SIGNIFICANT CHANGE UP (ref 2–14)
MONOCYTES NFR BLD AUTO: 10 % — SIGNIFICANT CHANGE UP (ref 2–14)
NEUTROPHILS # BLD AUTO: 2.92 K/UL — SIGNIFICANT CHANGE UP (ref 1.8–7.4)
NEUTROPHILS # BLD AUTO: 2.92 K/UL — SIGNIFICANT CHANGE UP (ref 1.8–7.4)
NEUTROPHILS NFR BLD AUTO: 55.8 % — SIGNIFICANT CHANGE UP (ref 43–77)
NEUTROPHILS NFR BLD AUTO: 55.8 % — SIGNIFICANT CHANGE UP (ref 43–77)
NRBC # BLD: 0 /100 WBCS — SIGNIFICANT CHANGE UP (ref 0–0)
NRBC # BLD: 0 /100 WBCS — SIGNIFICANT CHANGE UP (ref 0–0)
PLATELET # BLD AUTO: 295 K/UL — SIGNIFICANT CHANGE UP (ref 150–400)
PLATELET # BLD AUTO: 295 K/UL — SIGNIFICANT CHANGE UP (ref 150–400)
POTASSIUM SERPL-MCNC: 4.4 MMOL/L — SIGNIFICANT CHANGE UP (ref 3.5–5.3)
POTASSIUM SERPL-MCNC: 4.4 MMOL/L — SIGNIFICANT CHANGE UP (ref 3.5–5.3)
POTASSIUM SERPL-SCNC: 4.4 MMOL/L — SIGNIFICANT CHANGE UP (ref 3.5–5.3)
POTASSIUM SERPL-SCNC: 4.4 MMOL/L — SIGNIFICANT CHANGE UP (ref 3.5–5.3)
PROT SERPL-MCNC: 8.3 G/DL — SIGNIFICANT CHANGE UP (ref 6–8.3)
PROT SERPL-MCNC: 8.3 G/DL — SIGNIFICANT CHANGE UP (ref 6–8.3)
RBC # BLD: 4.57 M/UL — SIGNIFICANT CHANGE UP (ref 3.8–5.2)
RBC # BLD: 4.57 M/UL — SIGNIFICANT CHANGE UP (ref 3.8–5.2)
RBC # FLD: 15.5 % — HIGH (ref 10.3–14.5)
RBC # FLD: 15.5 % — HIGH (ref 10.3–14.5)
SODIUM SERPL-SCNC: 139 MMOL/L — SIGNIFICANT CHANGE UP (ref 135–145)
SODIUM SERPL-SCNC: 139 MMOL/L — SIGNIFICANT CHANGE UP (ref 135–145)
TSH SERPL-MCNC: 2.46 UIU/ML — SIGNIFICANT CHANGE UP (ref 0.27–4.2)
TSH SERPL-MCNC: 2.46 UIU/ML — SIGNIFICANT CHANGE UP (ref 0.27–4.2)
WBC # BLD: 5.22 K/UL — SIGNIFICANT CHANGE UP (ref 3.8–10.5)
WBC # BLD: 5.22 K/UL — SIGNIFICANT CHANGE UP (ref 3.8–10.5)
WBC # FLD AUTO: 5.22 K/UL — SIGNIFICANT CHANGE UP (ref 3.8–10.5)
WBC # FLD AUTO: 5.22 K/UL — SIGNIFICANT CHANGE UP (ref 3.8–10.5)

## 2023-12-20 PROCEDURE — 99214 OFFICE O/P EST MOD 30 MIN: CPT

## 2023-12-20 PROCEDURE — 99213 OFFICE O/P EST LOW 20 MIN: CPT

## 2023-12-20 NOTE — HISTORY OF PRESENT ILLNESS
[Disease: _____________________] : Disease: [unfilled] [de-identified] : 52 y.o female with newly diagnosed with cervical cancer in [facility].\par  \par  \par  \par  Ms. Sharma, 52 years old, accompanied by her daughter, Tammy and is referred by Dr. Charles, for HSIL (7/20/22 from [facility]) cervical mass, thickened endometrium noted on CT (from [facility]). 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 \par  \par  Pt is without symptoms; she has not seen a GYN in over 10 years and recalls never having an abnormal pap smear. She returned home to the [facility] to help her mother with medical problems and decided to have a GYN checkup at that time. Pap smear returned HSIL / +HPV. Pathology noted SCC moderately differentiated and CT scan identified cervical mass; thickened lining of the uterus; lymphadenopathy as well as a right breast mass and right axillary lymphadenopathy. \par  \par  She denies f/c/n/v/d/abnormal vaginal bleeding, changes to bowel or bladder habits. Denies unintentional weight loss or gain. Denies post-coital or intercycle vaginal bleeding; denies abdominal pain, distention, bloating, back pain or any other associated symptoms. \par  \par  Imaging:\par  7/22/20 ([facility], [facility])\par  Breast: 1.6 x 1.7 cm tuberous, polycyclic contour, accumulating contrast medium, visualized at the borderline of the lower quadrants of the right breast, with no infiltration of the thoracic muscles. \par  Right axillary region: 1.4 x 1.0 cm; 1.0 x 1.0 cm and 0.8 x 1.1 cm. lymphadenopathy. \par  Liver: well defined even borders, the liver parenchyma is with signs of fat rearrangement, a hypdense inclusion 1.6 x 2.1 cm in size, is subcapsularly visualized Segment 8 of the liver. It lacunarly accumulates contrast medium, additionally accumulates contrast medium during the delayed phase. The intrahepatic and extrahepatic bile ducts are not dilated. \par  Abdominal lymph nodes: paraaortic and interaortocaval lymph nodes are sized 10-12 mm, weakly accumulate contrast medium. \par  No free fluid in the abdominal cavity. \par  Uterus: 5.9 x 7.5 cm. \par  EM: 15 mm with dense fluid visualized, mildly heterogeneous. \par  Cervix: tissue lesion 56 x 42 mm invading the uterine isthmus and body. The parametria are infiltrated. \par  Uterine appendages are normally positioned, a cystic inclusion of 3.3 x 3.5 cm is visualized in the projection of the left ovary. \par  Right Ovary: 2.0 x 2.8 cm. \par  Lymph Nodes:\par  - right common iliac lymph node: 1.1 x 1.5 cm.\par  - right pelvic wall node: 1.8 x 3.1 cm\par  - left pelvic wall node: 2.4 x 3.0 cm. \par  - right external iliac lymph node: 1.8 x 1.6 cm \par  \par  She was seen by Dr. Holm and had a biospy.\par  Pathology: Squamous cell cancer.\par  Patient had right breast biopsy yesterday, pending results.\par  \par  SH: No smoking or drinking. 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 \par  Menarche: 12\par  Menopause: still menstruating. LMP 7/30/22\par  \par  She completed C6 of chemotherapy for cervical cancer on 1/4/23 with Cisplatin/Taxol/Bevacizumab/Pembrolizumab. Myesha d/c after 3 cycles due to mediport dehiscence. PET scan showed a good response to treatment and she is now going to start maintenance pembrolizumab.\par  \par  Pet scan shows very good response in breast and lymph nodes from chemotherapy     (Cis/Taxol/Keytruda). Patient sees Dr. Perrin and Dr. Acosta for stage IIIC breast cancer.\par      Given Stage IV cervical cancer there is no indication for doing breast surgery at this time, as per the breast team. Patient started on Lupron and anastrozole. \par  \par  \par  \par  \par   [FreeTextEntry1] : Keytruda [de-identified] : Patient is here for follow up and treatment. She is feeling well, has no complaints. PET scan 12/13/23: IMPRESSION: 1. Intense activity in the upper cervix extending to the lower uterus with hypermetabolic right external iliac nodes suspicious for recurrence; please correlate findings with recent cervical biopsy. 2. No evidence of locally recurrent FDG avid malignant breast lesion.  Patient referred to rad/onc for new findings on PET, seeing Dr. Louis next week. Denies abdominal pain, nausea, vomiting, diarrhea, constipation, bleeding, urinary symptoms, rash.

## 2023-12-20 NOTE — REASON FOR VISIT
[Follow-Up Visit] : a follow-up [Pre-Treatment Visit] : a pre-treatment [Other: _____] : [unfilled] [FreeTextEntry2] : cervical cancer, breast cancer

## 2023-12-20 NOTE — HISTORY OF PRESENT ILLNESS
[Disease: _____________________] : Disease: [unfilled] [T: ___] : T[unfilled] [N: ___] : N[unfilled] [M: ___] : M[unfilled] [AJCC Stage: ____] : AJCC Stage: [unfilled] [de-identified] : Cervical cancer diagnosed at age 52 in 2022 while in [facility] Right breast cancer diagnosed simultaneously at age 52 7/2022 Pap smear revealed HSIL and +HPV.  Pathology noted SCCA, moderately differentiated 7/22/22 CT scan in [facility] identified cervical mass, 5.6 x 4.2 cm, invading the uterine isthmus and body. The parametria are infiltrated. Thickened uterine lining. Lymphadenopathy with right common iliac lymph node: 1.1 x 1.5 cm, right pelvic wall node: 1.8 x 3.1 cm, left pelvic wall node: 2.4 x 3.0 cm, right external iliac lymph node: 1.8 x 1.6 cm  CT scan also reported a 1.6 x 1.7 cm tuberous mass at the borderline of the lower quadrant of the right breast, with no infiltration of the thoracic muscles.  Right axillary lymphadenopathy measuring 1.4 x 1.0 cm, 1.0 x 1.0 cm and 0.8 x 1.1 cm.  8/10/22 Cervical biopsy revealed high grade squamous intraepithelial lesion (HGSIL) (CIN3) with PD-L1 Tumor Proportion Score (TPS) 30%, positive 8/18/22 PET/CT scan showed a large area of intense activity within the uterine cavity extending into the uterine cervix with SUV 19.5. FDG avid retroperitoneal and bilateral pelvic lymphadenopathy (SUV 7.4 - 21.1). FDG avid right breast nodule (SUV 13.9) and right retropectoral (SUV 6.1), axillary (SUV 18.2) and internal mammary lymph nodes (SUV 5.8) suspicious for metastasis from breast. Mildly FDG avid prevascular and left retropectoral LN are indeterminate. Nonspecific FDG avidity in bilateral palatine tonsils, probably inflammatory. Nonspecific generalized BM hypermetabolism without CT correlate, probably reactive and may be related to anemia. 8/19/22 Mammogram and sonogram revealed heterogeneously dense breast parenchyma and an irregularly marginated mass in the upper outer quadrant  of the right breast with adjacent clusters of indeterminate calcifications. Sonogram revealed a 1.0 x 1.2 cm mass at 10:00. BI-RADS 5 8/23/22 Right breast biopsy revealed moderately to poorly differentiated IDC with extensive lymphovascular invasion, ER 95%, HI 95%, HER-2 2+ FISH negative 8/26/22 Right axillary LN revealed metastatic adenocarcinoma c/w breast origin.  8/26/22 Left supraclavicular LN positive for metastatic squamous cell carcinoma consistent with Stage IV cervical cancer 9/1/22 - 1/4/23 Chemotherapy for cervical cancer with 6 cycles of Cisplatin/Taxol/Bevacizumab/Pembrolizumab. Bevacizumab d/c after 3 cycles due to mediport dehiscence 1/11/23 PET/CT scan showed interval response to therapy with residual low level FDG avidity in lymph nodes in the pelvis similar to blood pool and no new lesions. Interval resolution of FDG-avid right breast lesion and retropectoral, right axillary, mediastinal, and right internal mammary lymph nodes. Low-level symmetrical FDG avidity in the christopher, nonspecific.  No FDG avid lymph nodes in the axilla above background. Interval decrease in size and FDG avidity of pelvic lymphadenopathy. Reference nodes: -Interval resolution of right internal iliac node; previously measuring approximately 1.1 cm, SUV 14. -Right external iliac mass, SUV 3.7 measuring 1.4 x 0.7 cm, image 220; previously 3 x 1.9 cm, SUV 21. -Right internal iliac node, no discernible FDG avidity, 0.8 x 0.6 cm, image 217; previously SUV 7.4, 1.6 x 1.5 cm. -Left pelvic sidewall mass, SUV 3.3, 1.7 x 0.6 cm, image 224; previously SUV 20, 3.4 x 2.2 cm (SUV 20.2; image 231). 9/14/22 - 1/4/23 Cisplatin/Taxol/Bevacizumab/Pembrolizumab for cervical cancer. Bevacizumab d/c after 3 cycles due to mediport dehiscence.  1/11/23 PET scan showed a good response to treatment. 2/1/23 Maintenance pembrolizumab started and to continue every 6 weeks. 2/23 Lupron started and to continue every 3 months with anastrozole 1 mg daily.   [de-identified] : Right breast IDC with extensive lymphovascular invasion, axillary LN positive, ER 95%, WV 95%, HER-2 2+ FISH negative [de-identified] : Shari is seen accompanied by her daughter Tammy, who also served as an . She completed C6 of chemotherapy for cervical cancer on 1/4/23 with Cisplatin/Taxol/Bevacizumab/Pembrolizumab. Myesha d/c after 3 cycles due to mediport dehiscence.  PET scan showed a good response to treatment and she started maintenance pembrolizumab every 3 weeks, later decreased to every 6 weeks. PET scan from 12/13/23 showed intense activity in the upper cervix extending to the lower uterus with hypermetabolic right external iliac nodes suspicious for recurrence. This was reviewed by Dr. Charles and she was referred to RT for further evaluation and management. She started Lupron and anastrozole in 2/23 and is tolerating them very well with mild arthralgias and rare hot flashes. She is stiff in the morning but feels better with exercise. 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  HEALTH MAINTENANCE: PCP: Dr. Heather Kumari Mammogram and breast ultrasound: 8/14/23 BI-RADS 4A Known biopsy-proven cancer in the right 10:00 location in association with clip and numerous calcifications spanning at least 5 cm on the mammogram for which appropriate surgical and oncologic management is recommended. A6 mm nodule in the right 2:00 location for which ultrasound guided core biopsy is again recommended. Findings and recommendations were conveyed to the patient her daughter as well as Dr. Acosta on 8/14/2023. The ultrasound-guided core biopsy is not being performed at the current time as per Dr. Acosta given patient's clinical extent of disease Bone density: 9/13/23 showed T scores of -1.2 in spine, -1.8 in femoral neck and -1.2 in total hip Pap Smear: 7/22 Colonoscopy: none

## 2023-12-21 ENCOUNTER — APPOINTMENT (OUTPATIENT)
Dept: HEMATOLOGY ONCOLOGY | Facility: CLINIC | Age: 53
End: 2023-12-21

## 2023-12-21 LAB — CORE LAB BIOPSY: NORMAL

## 2023-12-27 ENCOUNTER — NON-APPOINTMENT (OUTPATIENT)
Age: 53
End: 2023-12-27

## 2023-12-28 ENCOUNTER — APPOINTMENT (OUTPATIENT)
Dept: RADIATION ONCOLOGY | Facility: CLINIC | Age: 53
End: 2023-12-28
Payer: MEDICAID

## 2023-12-28 VITALS
OXYGEN SATURATION: 96 % | HEART RATE: 75 BPM | BODY MASS INDEX: 30.82 KG/M2 | DIASTOLIC BLOOD PRESSURE: 76 MMHG | SYSTOLIC BLOOD PRESSURE: 119 MMHG | RESPIRATION RATE: 17 BRPM | WEIGHT: 157 LBS | TEMPERATURE: 97.16 F | HEIGHT: 60 IN

## 2023-12-28 DIAGNOSIS — Z85.3 PERSONAL HISTORY OF MALIGNANT NEOPLASM OF BREAST: ICD-10-CM

## 2023-12-28 PROCEDURE — 99204 OFFICE O/P NEW MOD 45 MIN: CPT | Mod: GC

## 2023-12-28 RX ORDER — CHROMIUM 200 MCG
25 MCG TABLET ORAL
Refills: 0 | Status: ACTIVE | COMMUNITY
Start: 2023-12-28

## 2023-12-28 NOTE — VITALS
[Maximal Pain Intensity: 0/10] : 0/10 [Least Pain Intensity: 0/10] : 0/10 [90: Able to carry normal activity; minor signs or symptoms of disease.] : 90: Able to carry normal activity; minor signs or symptoms of disease.  [Date: ____________] : Patient's last distress assessment performed on [unfilled]. [4 - Distress Level] : Distress Level: 4

## 2023-12-29 NOTE — REASON FOR VISIT
[Consideration of Curative Therapy] : consideration of curative therapy for [Other: ___] : [unfilled] [Consideration for Non-Curative Therapy] : consideration for non-curative therapy for

## 2023-12-29 NOTE — PHYSICAL EXAM
[Normal] : well developed, well nourished, in no acute distress [Outer Ear] : the ears and nose were normal in appearance [] : no respiratory distress

## 2024-01-03 ENCOUNTER — NON-APPOINTMENT (OUTPATIENT)
Age: 54
End: 2024-01-03

## 2024-01-03 DIAGNOSIS — D53.9 NUTRITIONAL ANEMIA, UNSPECIFIED: ICD-10-CM

## 2024-01-03 PROCEDURE — 77263 THER RADIOLOGY TX PLNG CPLX: CPT

## 2024-01-03 PROCEDURE — 77334 RADIATION TREATMENT AID(S): CPT | Mod: 26

## 2024-01-11 NOTE — REVIEW OF SYSTEMS
[Negative] : Psychiatric [Fever] : no fever [Chills] : no chills [Eye Pain] : no eye pain [Confused] : no confusion [Dizziness] : no dizziness [FreeTextEntry9] : sometimes has stiffness in ankles in the morning

## 2024-01-11 NOTE — HISTORY OF PRESENT ILLNESS
[FreeTextEntry1] : Ms. Sharma is a 53 year old woman who presented with cervical cancer and stage IIIC right breast cancer, diagnosed simultaneously in 2022, now with recurrence of cervical cancer. She presents today to discuss radiation treatment options.  She is offered an  however prefers that her daughter interpret.   Briefly, pt went for routine gyn exam while visiting [facility]; pap revealed HSIL and +HPV. Pathology noted SCCA, moderately differentiated.  7/22/22 CT scan in [facility] identified cervical mass, 5.6 x 4.2 cm, invading the uterine isthmus and body. The parametria are infiltrated. Thickened uterine lining. Lymphadenopathy with right common iliac lymph node.  CT scan also reported a 1.6 x 1.7 cm tuberous mass at the borderline of the lower quadrant of the right breast, with no infiltration of the thoracic muscles. Right axillary lymphadenopathy.  8/10/22 Cervical biopsy revealed high grade squamous intraepithelial lesion (HGSIL) (CIN3) with PD-L1 Tumor Proportion Score (TPS) 30%, positive 8/18/22 PET/CT scan showed a large area of intense activity within the uterine cavity extending into the uterine cervix with SUV 19.5. FDG avid retroperitoneal and bilateral pelvic lymphadenopathy (SUV 7.4 - 21.1). FDG avid right breast nodule (SUV 13.9) and right retropectoral (SUV 6.1), axillary (SUV 18.2) and internal mammary lymph nodes (SUV 5.8) suspicious for metastasis from breast.   8/23/2022 right breast 10:00 4 cm FN biopsy showed moderate to poory differentiated invasive ductal carcinoma with mucinous and micropapillary features, 1.4 cm, extensive LVI, intermediate DCIS. ER+, MO+, HER-2 -.  8/26/2022 right axillary lymph node was positive for malignant cells, metastatic adenocarcinoma compatible with history of breast origin.  8/26/2022 Left supraclav lymph node is positive for metastatic squamous cell carcinoma compatible with cervical origin.  She completed six cycles of Cisplatin/Taxol/Myesha/Pembro on 1/4/23 (Myesha stopped after three cycles due to mediport dehiscence), with good response. She remains on maintenance pembro, as well as Lupron and anastrozole.   CT C/A/P on 11/21/23 showed newly enlarged right external iliac node.   PET 12/13/23 showed intense activity in the upper cervix extending to the lower uterus with hypermetabolic right external iliac nodes suspicious for recurrence. No evidence of locally recurrent FDG avid malignant breast lesion.  Cervical biopsy on 12/13/23 showed invasive squamos cell carcinoma, moderately differentiated.   Today she feels overall well. Denies significant pain. Denies vaginal discharge/vaginal bleeding, pain. 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

## 2024-01-22 ENCOUNTER — OUTPATIENT (OUTPATIENT)
Dept: OUTPATIENT SERVICES | Facility: HOSPITAL | Age: 54
LOS: 1 days | Discharge: HOME | End: 2024-01-22

## 2024-01-22 DIAGNOSIS — C53.9 MALIGNANT NEOPLASM OF CERVIX UTERI, UNSPECIFIED: ICD-10-CM

## 2024-01-25 PROCEDURE — 77338 DESIGN MLC DEVICE FOR IMRT: CPT | Mod: 26

## 2024-01-25 PROCEDURE — 77300 RADIATION THERAPY DOSE PLAN: CPT | Mod: 26

## 2024-01-25 PROCEDURE — 77301 RADIOTHERAPY DOSE PLAN IMRT: CPT | Mod: 26

## 2024-01-30 ENCOUNTER — NON-APPOINTMENT (OUTPATIENT)
Age: 54
End: 2024-01-30

## 2024-01-30 VITALS
DIASTOLIC BLOOD PRESSURE: 88 MMHG | HEART RATE: 74 BPM | HEIGHT: 60 IN | RESPIRATION RATE: 17 BRPM | TEMPERATURE: 96.98 F | SYSTOLIC BLOOD PRESSURE: 138 MMHG | OXYGEN SATURATION: 98 %

## 2024-01-30 PROCEDURE — G6002: CPT | Mod: 26

## 2024-01-30 NOTE — REVIEW OF SYSTEMS
[Constipation: Grade 0] : Constipation: Grade 0 [Diarrhea: Grade 0] : Diarrhea: Grade 0 [Fatigue: Grade 0] : Fatigue: Grade 0 [Hematuria: Grade 0] : Hematuria: Grade 0 [Urinary Tract Pain: Grade 0] : Urinary Tract Pain: Grade 0 [Vaginal Infection: Grade 0] : Vaginal Infection: Grade 0

## 2024-01-31 ENCOUNTER — RESULT REVIEW (OUTPATIENT)
Age: 54
End: 2024-01-31

## 2024-01-31 ENCOUNTER — APPOINTMENT (OUTPATIENT)
Dept: HEMATOLOGY ONCOLOGY | Facility: CLINIC | Age: 54
End: 2024-01-31
Payer: MEDICAID

## 2024-01-31 ENCOUNTER — NON-APPOINTMENT (OUTPATIENT)
Age: 54
End: 2024-01-31

## 2024-01-31 ENCOUNTER — APPOINTMENT (OUTPATIENT)
Dept: INFUSION THERAPY | Facility: HOSPITAL | Age: 54
End: 2024-01-31

## 2024-01-31 LAB
ALBUMIN SERPL ELPH-MCNC: 4.3 G/DL — SIGNIFICANT CHANGE UP (ref 3.3–5)
ALP SERPL-CCNC: 95 U/L — SIGNIFICANT CHANGE UP (ref 40–120)
ALT FLD-CCNC: 20 U/L — SIGNIFICANT CHANGE UP (ref 10–45)
ANION GAP SERPL CALC-SCNC: 12 MMOL/L — SIGNIFICANT CHANGE UP (ref 5–17)
AST SERPL-CCNC: 31 U/L — SIGNIFICANT CHANGE UP (ref 10–40)
BASOPHILS # BLD AUTO: 0.03 K/UL — SIGNIFICANT CHANGE UP (ref 0–0.2)
BASOPHILS NFR BLD AUTO: 0.7 % — SIGNIFICANT CHANGE UP (ref 0–2)
BILIRUB SERPL-MCNC: 0.2 MG/DL — SIGNIFICANT CHANGE UP (ref 0.2–1.2)
BUN SERPL-MCNC: 15 MG/DL — SIGNIFICANT CHANGE UP (ref 7–23)
CALCIUM SERPL-MCNC: 9.8 MG/DL — SIGNIFICANT CHANGE UP (ref 8.4–10.5)
CANCER AG125 SERPL-ACNC: 19 U/ML — SIGNIFICANT CHANGE UP
CHLORIDE SERPL-SCNC: 98 MMOL/L — SIGNIFICANT CHANGE UP (ref 96–108)
CO2 SERPL-SCNC: 25 MMOL/L — SIGNIFICANT CHANGE UP (ref 22–31)
CREAT SERPL-MCNC: 0.84 MG/DL — SIGNIFICANT CHANGE UP (ref 0.5–1.3)
EGFR: 83 ML/MIN/1.73M2 — SIGNIFICANT CHANGE UP
EOSINOPHIL # BLD AUTO: 0.21 K/UL — SIGNIFICANT CHANGE UP (ref 0–0.5)
EOSINOPHIL NFR BLD AUTO: 4.8 % — SIGNIFICANT CHANGE UP (ref 0–6)
GLUCOSE SERPL-MCNC: 99 MG/DL — SIGNIFICANT CHANGE UP (ref 70–99)
HCT VFR BLD CALC: 33.9 % — LOW (ref 34.5–45)
HGB BLD-MCNC: 10.9 G/DL — LOW (ref 11.5–15.5)
IMM GRANULOCYTES NFR BLD AUTO: 0.2 % — SIGNIFICANT CHANGE UP (ref 0–0.9)
LYMPHOCYTES # BLD AUTO: 1.16 K/UL — SIGNIFICANT CHANGE UP (ref 1–3.3)
LYMPHOCYTES # BLD AUTO: 26.4 % — SIGNIFICANT CHANGE UP (ref 13–44)
MCHC RBC-ENTMCNC: 25.1 PG — LOW (ref 27–34)
MCHC RBC-ENTMCNC: 32.2 G/DL — SIGNIFICANT CHANGE UP (ref 32–36)
MCV RBC AUTO: 77.9 FL — LOW (ref 80–100)
MONOCYTES # BLD AUTO: 0.45 K/UL — SIGNIFICANT CHANGE UP (ref 0–0.9)
MONOCYTES NFR BLD AUTO: 10.3 % — SIGNIFICANT CHANGE UP (ref 2–14)
NEUTROPHILS # BLD AUTO: 2.53 K/UL — SIGNIFICANT CHANGE UP (ref 1.8–7.4)
NEUTROPHILS NFR BLD AUTO: 57.6 % — SIGNIFICANT CHANGE UP (ref 43–77)
NRBC # BLD: 0 /100 WBCS — SIGNIFICANT CHANGE UP (ref 0–0)
PLATELET # BLD AUTO: 204 K/UL — SIGNIFICANT CHANGE UP (ref 150–400)
POTASSIUM SERPL-MCNC: 4.5 MMOL/L — SIGNIFICANT CHANGE UP (ref 3.5–5.3)
POTASSIUM SERPL-SCNC: 4.5 MMOL/L — SIGNIFICANT CHANGE UP (ref 3.5–5.3)
PROT SERPL-MCNC: 8 G/DL — SIGNIFICANT CHANGE UP (ref 6–8.3)
RBC # BLD: 4.35 M/UL — SIGNIFICANT CHANGE UP (ref 3.8–5.2)
RBC # FLD: 15.9 % — HIGH (ref 10.3–14.5)
SODIUM SERPL-SCNC: 134 MMOL/L — LOW (ref 135–145)
TSH SERPL-MCNC: 2.49 UIU/ML — SIGNIFICANT CHANGE UP (ref 0.27–4.2)
WBC # BLD: 4.39 K/UL — SIGNIFICANT CHANGE UP (ref 3.8–10.5)
WBC # FLD AUTO: 4.39 K/UL — SIGNIFICANT CHANGE UP (ref 3.8–10.5)

## 2024-01-31 PROCEDURE — 77014: CPT | Mod: 26

## 2024-01-31 PROCEDURE — 99214 OFFICE O/P EST MOD 30 MIN: CPT

## 2024-01-31 NOTE — REASON FOR VISIT
[Follow-Up Visit] : a follow-up [Other: _____] : [unfilled] [FreeTextEntry2] : metastatic cervical cancer, breast cancer.

## 2024-01-31 NOTE — HISTORY OF PRESENT ILLNESS
[Disease: _____________________] : Disease: [unfilled] [de-identified] : 52 y.o female with newly diagnosed with cervical cancer in [facility].\par  \par  \par  \par  Ms. Sharma, 52 years old, accompanied by her daughter, Tammy and is referred by Dr. Charles, for HSIL (7/20/22 from [facility]) cervical mass, thickened endometrium noted on CT (from [facility]). 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 \par  \par  Pt is without symptoms; she has not seen a GYN in over 10 years and recalls never having an abnormal pap smear. She returned home to the [facility] to help her mother with medical problems and decided to have a GYN checkup at that time. Pap smear returned HSIL / +HPV. Pathology noted SCC moderately differentiated and CT scan identified cervical mass; thickened lining of the uterus; lymphadenopathy as well as a right breast mass and right axillary lymphadenopathy. \par  \par  She denies f/c/n/v/d/abnormal vaginal bleeding, changes to bowel or bladder habits. Denies unintentional weight loss or gain. Denies post-coital or intercycle vaginal bleeding; denies abdominal pain, distention, bloating, back pain or any other associated symptoms. \par  \par  Imaging:\par  7/22/20 ([facility], [facility])\par  Breast: 1.6 x 1.7 cm tuberous, polycyclic contour, accumulating contrast medium, visualized at the borderline of the lower quadrants of the right breast, with no infiltration of the thoracic muscles. \par  Right axillary region: 1.4 x 1.0 cm; 1.0 x 1.0 cm and 0.8 x 1.1 cm. lymphadenopathy. \par  Liver: well defined even borders, the liver parenchyma is with signs of fat rearrangement, a hypdense inclusion 1.6 x 2.1 cm in size, is subcapsularly visualized Segment 8 of the liver. It lacunarly accumulates contrast medium, additionally accumulates contrast medium during the delayed phase. The intrahepatic and extrahepatic bile ducts are not dilated. \par  Abdominal lymph nodes: paraaortic and interaortocaval lymph nodes are sized 10-12 mm, weakly accumulate contrast medium. \par  No free fluid in the abdominal cavity. \par  Uterus: 5.9 x 7.5 cm. \par  EM: 15 mm with dense fluid visualized, mildly heterogeneous. \par  Cervix: tissue lesion 56 x 42 mm invading the uterine isthmus and body. The parametria are infiltrated. \par  Uterine appendages are normally positioned, a cystic inclusion of 3.3 x 3.5 cm is visualized in the projection of the left ovary. \par  Right Ovary: 2.0 x 2.8 cm. \par  Lymph Nodes:\par  - right common iliac lymph node: 1.1 x 1.5 cm.\par  - right pelvic wall node: 1.8 x 3.1 cm\par  - left pelvic wall node: 2.4 x 3.0 cm. \par  - right external iliac lymph node: 1.8 x 1.6 cm \par  \par  She was seen by Dr. Holm and had a biospy.\par  Pathology: Squamous cell cancer.\par  Patient had right breast biopsy yesterday, pending results.\par  \par  SH: No smoking or drinking. 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 \par  Menarche: 12\par  Menopause: still menstruating. LMP 7/30/22\par  \par  She completed C6 of chemotherapy for cervical cancer on 1/4/23 with Cisplatin/Taxol/Bevacizumab/Pembrolizumab. Myesha d/c after 3 cycles due to mediport dehiscence. PET scan showed a good response to treatment and she is now going to start maintenance pembrolizumab.\par  \par  Pet scan shows very good response in breast and lymph nodes from chemotherapy     (Cis/Taxol/Keytruda). Patient sees Dr. Perrin and Dr. Acosta for stage IIIC breast cancer.\par      Given Stage IV cervical cancer there is no indication for doing breast surgery at this time, as per the breast team. Patient started on Lupron and anastrozole. \par  \par  \par  \par  \par   [de-identified] : Patient started RT this week. Tolerating treatment so far. Patient presented with: "Implementation of Circulating HPV DNA for the Screening and Surveillance of HPV-related Gynecologic Cancers" study. Study was discussed with patient in detail including purpose, risks and benefits. After all questions answered she agreed to participate and was given a copy of the signed consent. Informed that her blood draw today will be obtained in the treatment room, and that she will be contacted at a later date when additional blood draws are needed.

## 2024-02-01 DIAGNOSIS — Z51.11 ENCOUNTER FOR ANTINEOPLASTIC CHEMOTHERAPY: ICD-10-CM

## 2024-02-01 PROCEDURE — G6002: CPT | Mod: 26

## 2024-02-01 NOTE — DISEASE MANAGEMENT
[Clinical] : TNM Stage: c [IV] : IV [TTNM] : x [NTNM] : 1 [MTNM] : 1 [de-identified] : 212 [de-identified] : 4939 [de-identified] : pelvis/cervix

## 2024-02-01 NOTE — PHYSICAL EXAM
[Normal] : well developed, well nourished, in no acute distress [Abdomen Soft] : soft [Nondistended] : nondistended [Skin Color & Pigmentation] : normal skin color and pigmentation [] : no rash

## 2024-02-01 NOTE — HISTORY OF PRESENT ILLNESS
[FreeTextEntry1] : MARISOL OROSCO is a 53-year-old F w/ metastatic cervical cancer & synchronous right breast IDC (ER+/MA+/HER2-) with left supraclavicular LN biopsy positive for metastatic SCC c/w stage IV cervical cancer, started on cisplatin/Taxol/Keytruda for cervical ca, with response, resolution of LNs avidity. However, recent PET scan was concerning for one external iliac lymph node as well as recurrence in the primary site. Prior PET with no residual hypermetabolism in the primary. She is on Pembro and hormonal tx (anastrozole and Lupron) for breast CA.  She is accompanied by her daughter who assists with translation as needed.    1/30/24: Started on treatment. Ms. Orosco started on treatment today.  She is feeling well today, denies any pain.  No bowel or urinary complaints and no vaginal bleeding.  Reinforced what to expect with radiation therapy and possible side effects.

## 2024-02-02 PROCEDURE — G6002: CPT | Mod: 26

## 2024-02-05 PROCEDURE — G6002: CPT | Mod: 26

## 2024-02-05 PROCEDURE — 77427 RADIATION TX MANAGEMENT X5: CPT

## 2024-02-06 ENCOUNTER — NON-APPOINTMENT (OUTPATIENT)
Age: 54
End: 2024-02-06

## 2024-02-06 PROCEDURE — 77014: CPT | Mod: 26

## 2024-02-07 PROCEDURE — G6002: CPT | Mod: 26

## 2024-02-08 PROCEDURE — G6002: CPT | Mod: 26

## 2024-02-09 NOTE — HISTORY OF PRESENT ILLNESS
[FreeTextEntry1] : MARISOL OROSCO is a 53-year-old F w/ metastatic cervical cancer & synchronous right breast IDC (ER+/WI+/HER2-) with left supraclavicular LN biopsy positive for metastatic SCC c/w stage IV cervical cancer, started on cisplatin/Taxol/Keytruda for cervical ca, with response, resolution of LNs avidity. However, recent PET scan was concerning for one external iliac lymph node as well as recurrence in the primary site. Prior PET with no residual hypermetabolism in the primary. She is on Pembro and hormonal tx (anastrozole and Lupron) for breast CA.  She is accompanied by her daughter who assists with translation as needed.    1/30/24: Started on treatment. Ms. Orosco started on treatment today.  She is feeling well today, denies any pain.  No bowel or urinary complaints and no vaginal bleeding.  Reinforced what to expect with radiation therapy and possible side effects.    2/6/24: Tolerating treatment with no new complaints.

## 2024-02-09 NOTE — DISEASE MANAGEMENT
[Clinical] : TNM Stage: c [IV] : IV [TTNM] : x [NTNM] : 1 [MTNM] : 1 [de-identified] : 4145 [de-identified] : 0600 [de-identified] : pelvis/cervix

## 2024-02-12 PROCEDURE — G6002: CPT | Mod: 26

## 2024-02-13 ENCOUNTER — NON-APPOINTMENT (OUTPATIENT)
Age: 54
End: 2024-02-13

## 2024-02-13 PROCEDURE — 77427 RADIATION TX MANAGEMENT X5: CPT

## 2024-02-13 PROCEDURE — 77014: CPT | Mod: 26

## 2024-02-14 PROCEDURE — G6002: CPT | Mod: 26

## 2024-02-15 ENCOUNTER — APPOINTMENT (OUTPATIENT)
Dept: SURGICAL ONCOLOGY | Facility: CLINIC | Age: 54
End: 2024-02-15
Payer: MEDICAID

## 2024-02-15 VITALS
DIASTOLIC BLOOD PRESSURE: 80 MMHG | RESPIRATION RATE: 17 BRPM | SYSTOLIC BLOOD PRESSURE: 128 MMHG | HEIGHT: 61 IN | HEART RATE: 73 BPM | OXYGEN SATURATION: 98 % | BODY MASS INDEX: 28.32 KG/M2 | WEIGHT: 150 LBS

## 2024-02-15 PROCEDURE — 99214 OFFICE O/P EST MOD 30 MIN: CPT

## 2024-02-15 PROCEDURE — G6002: CPT | Mod: 26

## 2024-02-15 NOTE — HISTORY OF PRESENT ILLNESS
[de-identified] : Ms. MARISOL OROSCO is a 53 year old woman, primarily [place]alander speaking, who presents today for an follow up breast cancer. Accompanied by her daughter, Tammy, who the patient prefers to translate for her.   B/L mammo 7/26/22 ([facility]) with benign findings to left breast (BIRADS 2) and right breast shows a suspicious but probably benign lesion (BIRADS 3).  She returned to [place] from the [facility] to establish care after having a biopsy in [facility] that was c/w cervical cancer. She established care with Dr. Radha Holm who repeated a cervical biopsy (path shows high grade squamous intraepithelial lesion to cervix, fragments of SCC w/ extensive tumor necrosis to endocervix) Dr. Holm also ordered PET/CT & further breast imaging   PET/CT 8/18/22 shows large area of intense activity w/in the uterine cavity corresponding to known uterine malignancy, nonspecific focal FDG activity in vaginal region, concerning for disease involvement. FDG avid retroperitoneal & B/L pelvic lymphadenopathy, c/w metastases from uterus. FDG avid right breast nodule likely malignancy. FDG avid right retropectoral, right axillary and right internal mammary nodes, suspicious for metastasis from breast malignancy. Mildly FDG avid prevascular and left retropectoral lymph nodes are indeterminate. Nonspecific approx symmetric FDG avidity in b/l palatine tonsils, probably inflammatory. Nonspecific generalized bone marrow hypermetabolism w/o CT correlate, probably reactive and may be related to anemia.  right breast diagnostic mammo/ bilateral sono to follow-up on [facility] abnormal imaging, done on 8/19/2022.  US showed suspicious mass to right breast 10:00 4 cm fn, US biopsy recommended, highly suspicious for malignancy, BI-RADS 5.  8/23/22, right breast US biopsy, 10:00 4 cm fn, path: moderately to poorly differentiated invasive ductal carcinoma with mucinous and micropapillary features, 1.4 cm, intermediate grade DCIS, cribriform pattern, extensive lymphovascular invasion present, microcalcifications present in malignant and benign epithelium. ER+/AZ+/HER2 negative by FISH  8/26/22- right axillary LN metastatic adenocarcinoma c/w breast origin  8/26/22- left supraclavicular LN positive for metastatic SCC c/w stage IV cervical cancer  CT chest/abd/pelvis 11/15/22: resolved previously seen lymphadenopathy. No evidence for residual right breast mass however dedicated right breast imaging or PET/CT would be more sensitive. Decreased overall size of the cervical mass which would be best assessed by pelvic MRI.   Family history of father with stomach cancer.   B/L mammo/sono 1/10/23 showed biopsy proven right breast (10:00) carcinoma, decreased in size & stable calcifications, indeterminate right breast mass to 2:00- U/S biopsy recommended, BI-RADS 4A  completed 6 cycles of chemo for cervical cancer 1/2023   PET/CT 1/11/23- Compared with PET scan 8/8/2022, there has been interval response to therapy. Residual low-level FDG avidity in lymph nodes in the pelvis is similar to blood pool. No new lesions. FDG-avidity in the soft tissues around the Mediport removal, query soft tissue infection.  CT chest/abd/pelvis 3/20/23: ROYA  Started maintenance Keytruda 2/2023 (w/ Dr. Charles) along with Lupron and Anastrozole w/ Dr. Perrin   Patient currently undergoing radiation therapy to the pelvic area with Dr. Louis

## 2024-02-15 NOTE — PHYSICAL EXAM
[Normal] : supple, no neck mass and thyroid not enlarged [Normal Neck Lymph Nodes] : normal neck lymph nodes  [Normal Supraclavicular Lymph Nodes] : normal supraclavicular lymph nodes [Normal Axillary Lymph Nodes] : normal axillary lymph nodes [Normal] : oriented to person, place and time, with appropriate affect [de-identified] : scar at superior aspect of left breast but no other masses

## 2024-02-15 NOTE — ASSESSMENT
[FreeTextEntry1] : IMP: 54yo F w/ metastatic cervical cancer & synchronous right breast IDC (ER+/NE+/HER2-) 8/26/22- right axillary LN metastatic adenocarcinoma c/w breast origin 8/26/22- left supraclavicular LN positive for metastatic SCC c/w stage IV cervical cancer  on cisplatin/Taxol/Keytruda for cervical ca (completed 6 cycles as of 1/4/2023) Avastin D/Alfredo r/t port dehiscence  PET/CT 1/11/23- Compared with PET scan 8/8/2022, there has been interval response to therapy. Residual low-level FDG avidity in lymph nodes in the pelvis is similar to blood pool. No new lesions.  Started maintenance Keytruda 2/2023 (w/ Dr. Charles) along with Lupron and Anastrozole w/ Dr. Perrin  CT C/A/P 8/2023- - focal enhancement at the lower uterine segment and cervix, not seen on the prior CT 3/20/2023, not well characterized on CT. Pelvic ultrasound or MRI is recommended for further evaluation. - A right external iliac chain lymph node measures up to 0.8 cm in short axis, slightly increased in size compared to the prior CT where it measured 0.3 cm at a similar level. However, there is no abdominopelvic lymphadenopathy by size criteria. Continued attention on follow-up is advised.  B/L mammo/sono 8/2023 -  - Known biopsy-proven cancer in the right 10:00 location in association with clip and numerous calcifications spanning at least 5 cm on the mammogram for which appropriate surgical and oncologic management is recommended. - 6 mm nodule in the right 2:00 location for which ultrasound guided core biopsy is again recommended. BIRADS 4A **no biopsy done given extent of disease  PLAN: No role for breast surgery at this time RTO 3 months continue Keytruda and Lupron & Arimidex

## 2024-02-16 PROCEDURE — G6002: CPT | Mod: 26

## 2024-02-20 ENCOUNTER — NON-APPOINTMENT (OUTPATIENT)
Age: 54
End: 2024-02-20

## 2024-02-20 VITALS
HEIGHT: 61 IN | DIASTOLIC BLOOD PRESSURE: 77 MMHG | SYSTOLIC BLOOD PRESSURE: 115 MMHG | HEART RATE: 69 BPM | WEIGHT: 152.65 LBS | BODY MASS INDEX: 28.82 KG/M2 | OXYGEN SATURATION: 95 % | RESPIRATION RATE: 17 BRPM | TEMPERATURE: 97.16 F

## 2024-02-20 PROCEDURE — 77014: CPT | Mod: 26

## 2024-02-21 PROCEDURE — G6002: CPT | Mod: 26

## 2024-02-21 PROCEDURE — 77427 RADIATION TX MANAGEMENT X5: CPT

## 2024-02-22 PROCEDURE — G6002: CPT | Mod: 26

## 2024-02-23 LAB
BASOPHILS # BLD AUTO: 0.03 K/UL
BASOPHILS NFR BLD AUTO: 0.8 %
EOSINOPHIL # BLD AUTO: 0.21 K/UL
EOSINOPHIL NFR BLD AUTO: 5.8 %
HCT VFR BLD CALC: 33 %
HGB BLD-MCNC: 10.7 G/DL
IMM GRANULOCYTES NFR BLD AUTO: 0.3 %
LYMPHOCYTES # BLD AUTO: 0.61 K/UL
LYMPHOCYTES NFR BLD AUTO: 16.9 %
MAN DIFF?: NORMAL
MCHC RBC-ENTMCNC: 24.9 PG
MCHC RBC-ENTMCNC: 32.4 GM/DL
MCV RBC AUTO: 76.7 FL
MONOCYTES # BLD AUTO: 0.45 K/UL
MONOCYTES NFR BLD AUTO: 12.5 %
NEUTROPHILS # BLD AUTO: 2.29 K/UL
NEUTROPHILS NFR BLD AUTO: 63.7 %
PLATELET # BLD AUTO: 198 K/UL
RBC # BLD: 4.3 M/UL
RBC # FLD: 17.5 %
WBC # FLD AUTO: 3.6 K/UL

## 2024-02-23 PROCEDURE — G6002: CPT | Mod: 26

## 2024-02-26 PROCEDURE — G6002: CPT | Mod: 26

## 2024-02-26 NOTE — VITALS
[Least Pain Intensity: 0/10] : 0/10 [Maximal Pain Intensity: 0/10] : 0/10 [90: Able to carry normal activity; minor signs or symptoms of disease.] : 90: Able to carry normal activity; minor signs or symptoms of disease.  [NoTreatment Scheduled] : no treatment scheduled [ECOG Performance Status: 1 - Restricted in physically strenuous activity but ambulatory and able to carry out work of a light or sedentary nature] : Performance Status: 1 - Restricted in physically strenuous activity but ambulatory and able to carry out work of a light or sedentary nature, e.g., light house work, office work

## 2024-02-26 NOTE — REVIEW OF SYSTEMS
[Constipation: Grade 0] : Constipation: Grade 0 [Fatigue: Grade 0] : Fatigue: Grade 0 [Diarrhea: Grade 0] : Diarrhea: Grade 0 [Hematuria: Grade 0] : Hematuria: Grade 0 [Urinary Tract Pain: Grade 0] : Urinary Tract Pain: Grade 0 [Vaginal Infection: Grade 0] : Vaginal Infection: Grade 0

## 2024-02-26 NOTE — PHYSICAL EXAM
[Normal] : well developed, well nourished, in no acute distress [Nondistended] : nondistended [Abdomen Soft] : soft [Skin Color & Pigmentation] : normal skin color and pigmentation [] : no rash

## 2024-02-27 ENCOUNTER — NON-APPOINTMENT (OUTPATIENT)
Age: 54
End: 2024-02-27

## 2024-02-27 VITALS
TEMPERATURE: 96.98 F | SYSTOLIC BLOOD PRESSURE: 125 MMHG | DIASTOLIC BLOOD PRESSURE: 84 MMHG | HEART RATE: 76 BPM | HEIGHT: 61 IN | OXYGEN SATURATION: 97 % | RESPIRATION RATE: 17 BRPM

## 2024-02-27 PROCEDURE — G6002: CPT | Mod: 26

## 2024-02-28 PROCEDURE — 77427 RADIATION TX MANAGEMENT X5: CPT

## 2024-02-28 PROCEDURE — G6002: CPT | Mod: 26

## 2024-02-29 ENCOUNTER — RESULT REVIEW (OUTPATIENT)
Age: 54
End: 2024-02-29

## 2024-02-29 PROCEDURE — 77014: CPT | Mod: 26

## 2024-02-29 PROCEDURE — G6002: CPT | Mod: 26,59

## 2024-03-01 PROCEDURE — G6002: CPT | Mod: 26

## 2024-03-04 PROCEDURE — G6002: CPT | Mod: 26

## 2024-03-05 ENCOUNTER — NON-APPOINTMENT (OUTPATIENT)
Age: 54
End: 2024-03-05

## 2024-03-05 PROCEDURE — G6002: CPT | Mod: 26

## 2024-03-05 NOTE — VITALS
[Least Pain Intensity: 0/10] : 0/10 [Maximal Pain Intensity: 0/10] : 0/10 [NoTreatment Scheduled] : no treatment scheduled [90: Able to carry normal activity; minor signs or symptoms of disease.] : 90: Able to carry normal activity; minor signs or symptoms of disease.  [ECOG Performance Status: 1 - Restricted in physically strenuous activity but ambulatory and able to carry out work of a light or sedentary nature] : Performance Status: 1 - Restricted in physically strenuous activity but ambulatory and able to carry out work of a light or sedentary nature, e.g., light house work, office work

## 2024-03-05 NOTE — PHYSICAL EXAM
[Abdomen Soft] : soft [Normal] : well developed, well nourished, in no acute distress [Nondistended] : nondistended [Skin Color & Pigmentation] : normal skin color and pigmentation [] : no rash

## 2024-03-05 NOTE — REVIEW OF SYSTEMS
[Constipation: Grade 0] : Constipation: Grade 0 [Diarrhea: Grade 0] : Diarrhea: Grade 0 [Hematuria: Grade 0] : Hematuria: Grade 0 [Fatigue: Grade 0] : Fatigue: Grade 0 [Urinary Tract Pain: Grade 0] : Urinary Tract Pain: Grade 0 [Vaginal Infection: Grade 0] : Vaginal Infection: Grade 0

## 2024-03-06 PROCEDURE — 77427 RADIATION TX MANAGEMENT X5: CPT

## 2024-03-06 PROCEDURE — G6002: CPT | Mod: 26

## 2024-03-07 PROCEDURE — 77014: CPT | Mod: 26

## 2024-03-08 PROCEDURE — G6002: CPT | Mod: 26

## 2024-03-11 PROCEDURE — G6002: CPT | Mod: 26

## 2024-03-11 NOTE — HISTORY OF PRESENT ILLNESS
[FreeTextEntry1] : MARISOL OROSCO is a 53-year-old F w/ metastatic cervical cancer & synchronous right breast IDC (ER+/NH+/HER2-) with left supraclavicular LN biopsy positive for metastatic SCC c/w stage IV cervical cancer, started on cisplatin/Taxol/Keytruda for cervical ca, with response, resolution of LNs avidity. However, recent PET scan was concerning for one external iliac lymph node as well as recurrence in the primary site. Prior PET with no residual hypermetabolism in the primary. She is on Pembro and hormonal tx (anastrozole and Lupron) for breast CA.  She is accompanied by her daughter who assists with translation as needed.    1/30/24: Started on treatment. Ms. Orosco started on treatment today.  She is feeling well today, denies any pain.  No bowel or urinary complaints and no vaginal bleeding.  Reinforced what to expect with radiation therapy and possible side effects.    2/6/24: Tolerating treatment with no new complaints.  2/13/24: Tolerating treatment well, fx 10/28.  Patient has no complaints..

## 2024-03-11 NOTE — DISEASE MANAGEMENT
[Clinical] : TNM Stage: c [IV] : IV [TTNM] : x [NTNM] : 1 [MTNM] : 1 [de-identified] : 7640 [de-identified] : 5008 [de-identified] : pelvis/cervix

## 2024-03-11 NOTE — DISEASE MANAGEMENT
[Clinical] : TNM Stage: c [IV] : IV [TTNM] : x [NTNM] : 1 [MTNM] : 1 [de-identified] : 4802 [de-identified] : 5515 [de-identified] : pelvis/cervix

## 2024-03-11 NOTE — HISTORY OF PRESENT ILLNESS
[FreeTextEntry1] : MARISOL OROSCO is a 53-year-old F w/ metastatic cervical cancer & synchronous right breast IDC (ER+/PA+/HER2-) with left supraclavicular LN biopsy positive for metastatic SCC c/w stage IV cervical cancer, started on cisplatin/Taxol/Keytruda for cervical ca, with response, resolution of LNs avidity. However, recent PET scan was concerning for one external iliac lymph node as well as recurrence in the primary site. Prior PET with no residual hypermetabolism in the primary. She is on Pembro and hormonal tx (anastrozole and Lupron) for breast CA.  She is accompanied by her daughter who assists with translation as needed.    1/30/24: Started on treatment. Ms. Orosco started on treatment today.  She is feeling well today, denies any pain.  No bowel or urinary complaints and no vaginal bleeding.  Reinforced what to expect with radiation therapy and possible side effects.    2/6/24: Tolerating treatment with no new complaints.  2/13/24: Tolerating treatment well, fx 10/28.  Patient has no complaints..   ,7 2/20/24 Fx 14/28 Patient feeling well, no urinary symptoms or vaginal discharge.  Denies pain/fatigue.  Patient having 1 large lo0se bowel movement per day, reviewed low residue diet.

## 2024-03-11 NOTE — HISTORY OF PRESENT ILLNESS
[FreeTextEntry1] : MARISOL OROSCO is a 53-year-old F w/ metastatic cervical cancer & synchronous right breast IDC (ER+/CO+/HER2-) with left supraclavicular LN biopsy positive for metastatic SCC c/w stage IV cervical cancer, started on cisplatin/Taxol/Keytruda for cervical ca, with response, resolution of LNs avidity. However, recent PET scan was concerning for one external iliac lymph node as well as recurrence in the primary site. Prior PET with no residual hypermetabolism in the primary. She is on Pembro and hormonal tx (anastrozole and Lupron) for breast CA.  She is accompanied by her daughter who assists with translation as needed.    1/30/24: Started on treatment. Ms. Orosco started on treatment today.  She is feeling well today, denies any pain.  No bowel or urinary complaints and no vaginal bleeding.  Reinforced what to expect with radiation therapy and possible side effects.    2/6/24: Tolerating treatment with no new complaints.  2/13/24: Tolerating treatment well, fx 10/28.  Patient has no complaints.  2/27/24: Presents for OTV, 19/28 fx completed. Tolerating treatment. Reports feeling well, no urinary difficulty or bowel issues.  3/5/24:  Patient treatment 24/28 fx.  Reviewed discharge folder and PTE.  Patient reports welling well, no issues with bowel or bladder.  Denies pain and fatigue.

## 2024-03-11 NOTE — DISEASE MANAGEMENT
[Clinical] : TNM Stage: c [IV] : IV [TTNM] : x [NTNM] : 1 [MTNM] : 1 [de-identified] : 1319 [de-identified] : 6734 [de-identified] : pelvis/cervix

## 2024-03-11 NOTE — DISEASE MANAGEMENT
[Clinical] : TNM Stage: c [IV] : IV [TTNM] : x [NTNM] : 1 [MTNM] : 1 [de-identified] : 8321 [de-identified] : 1005 [de-identified] : pelvis/cervix

## 2024-03-11 NOTE — HISTORY OF PRESENT ILLNESS
[FreeTextEntry1] : MARISOL OROSCO is a 53-year-old F w/ metastatic cervical cancer & synchronous right breast IDC (ER+/UT+/HER2-) with left supraclavicular LN biopsy positive for metastatic SCC c/w stage IV cervical cancer, started on cisplatin/Taxol/Keytruda for cervical ca, with response, resolution of LNs avidity. However, recent PET scan was concerning for one external iliac lymph node as well as recurrence in the primary site. Prior PET with no residual hypermetabolism in the primary. She is on Pembro and hormonal tx (anastrozole and Lupron) for breast CA.  She is accompanied by her daughter who assists with translation as needed.    1/30/24: Started on treatment. Ms. Orosco started on treatment today.  She is feeling well today, denies any pain.  No bowel or urinary complaints and no vaginal bleeding.  Reinforced what to expect with radiation therapy and possible side effects.    2/6/24: Tolerating treatment with no new complaints.  2/13/24: Tolerating treatment well, fx 10/28.  Patient has no complaints.  2/27/24: Presents for OTV, 19/28 fx completed. Tolerating treatment. Reports feeling well, no urinary difficulty or bowel issues.

## 2024-03-12 ENCOUNTER — OUTPATIENT (OUTPATIENT)
Dept: OUTPATIENT SERVICES | Facility: HOSPITAL | Age: 54
LOS: 1 days | End: 2024-03-12
Payer: MEDICAID

## 2024-03-12 ENCOUNTER — APPOINTMENT (OUTPATIENT)
Dept: MRI IMAGING | Facility: CLINIC | Age: 54
End: 2024-03-12
Payer: MEDICAID

## 2024-03-12 DIAGNOSIS — C53.9 MALIGNANT NEOPLASM OF CERVIX UTERI, UNSPECIFIED: ICD-10-CM

## 2024-03-12 PROCEDURE — 74183 MRI ABD W/O CNTR FLWD CNTR: CPT

## 2024-03-12 PROCEDURE — A9585: CPT

## 2024-03-12 PROCEDURE — 72197 MRI PELVIS W/O & W/DYE: CPT | Mod: 26

## 2024-03-12 PROCEDURE — 72197 MRI PELVIS W/O & W/DYE: CPT

## 2024-03-12 PROCEDURE — 74183 MRI ABD W/O CNTR FLWD CNTR: CPT | Mod: 26

## 2024-03-13 ENCOUNTER — RESULT REVIEW (OUTPATIENT)
Age: 54
End: 2024-03-13

## 2024-03-13 ENCOUNTER — APPOINTMENT (OUTPATIENT)
Dept: HEMATOLOGY ONCOLOGY | Facility: CLINIC | Age: 54
End: 2024-03-13
Payer: COMMERCIAL

## 2024-03-13 ENCOUNTER — APPOINTMENT (OUTPATIENT)
Dept: INFUSION THERAPY | Facility: HOSPITAL | Age: 54
End: 2024-03-13

## 2024-03-13 VITALS
TEMPERATURE: 97 F | OXYGEN SATURATION: 98 % | SYSTOLIC BLOOD PRESSURE: 111 MMHG | BODY MASS INDEX: 28.34 KG/M2 | RESPIRATION RATE: 16 BRPM | WEIGHT: 150 LBS | HEART RATE: 67 BPM | DIASTOLIC BLOOD PRESSURE: 75 MMHG

## 2024-03-13 LAB
ALBUMIN SERPL ELPH-MCNC: 4.6 G/DL — SIGNIFICANT CHANGE UP (ref 3.3–5)
ALP SERPL-CCNC: 107 U/L — SIGNIFICANT CHANGE UP (ref 40–120)
ALT FLD-CCNC: 32 U/L — SIGNIFICANT CHANGE UP (ref 10–45)
ANION GAP SERPL CALC-SCNC: 10 MMOL/L — SIGNIFICANT CHANGE UP (ref 5–17)
AST SERPL-CCNC: 35 U/L — SIGNIFICANT CHANGE UP (ref 10–40)
BASOPHILS # BLD AUTO: 0.02 K/UL — SIGNIFICANT CHANGE UP (ref 0–0.2)
BASOPHILS NFR BLD AUTO: 0.6 % — SIGNIFICANT CHANGE UP (ref 0–2)
BILIRUB SERPL-MCNC: 0.4 MG/DL — SIGNIFICANT CHANGE UP (ref 0.2–1.2)
BUN SERPL-MCNC: 17 MG/DL — SIGNIFICANT CHANGE UP (ref 7–23)
CALCIUM SERPL-MCNC: 10.1 MG/DL — SIGNIFICANT CHANGE UP (ref 8.4–10.5)
CANCER AG125 SERPL-ACNC: 16 U/ML — SIGNIFICANT CHANGE UP
CHLORIDE SERPL-SCNC: 103 MMOL/L — SIGNIFICANT CHANGE UP (ref 96–108)
CO2 SERPL-SCNC: 26 MMOL/L — SIGNIFICANT CHANGE UP (ref 22–31)
CREAT SERPL-MCNC: 0.82 MG/DL — SIGNIFICANT CHANGE UP (ref 0.5–1.3)
EGFR: 85 ML/MIN/1.73M2 — SIGNIFICANT CHANGE UP
EOSINOPHIL # BLD AUTO: 0.18 K/UL — SIGNIFICANT CHANGE UP (ref 0–0.5)
EOSINOPHIL NFR BLD AUTO: 5.8 % — SIGNIFICANT CHANGE UP (ref 0–6)
GLUCOSE SERPL-MCNC: 82 MG/DL — SIGNIFICANT CHANGE UP (ref 70–99)
HCT VFR BLD CALC: 35.4 % — SIGNIFICANT CHANGE UP (ref 34.5–45)
HGB BLD-MCNC: 11.5 G/DL — SIGNIFICANT CHANGE UP (ref 11.5–15.5)
IMM GRANULOCYTES NFR BLD AUTO: 0.3 % — SIGNIFICANT CHANGE UP (ref 0–0.9)
LYMPHOCYTES # BLD AUTO: 0.35 K/UL — LOW (ref 1–3.3)
LYMPHOCYTES # BLD AUTO: 11.3 % — LOW (ref 13–44)
MCHC RBC-ENTMCNC: 26.3 PG — LOW (ref 27–34)
MCHC RBC-ENTMCNC: 32.5 G/DL — SIGNIFICANT CHANGE UP (ref 32–36)
MCV RBC AUTO: 80.8 FL — SIGNIFICANT CHANGE UP (ref 80–100)
MONOCYTES # BLD AUTO: 0.41 K/UL — SIGNIFICANT CHANGE UP (ref 0–0.9)
MONOCYTES NFR BLD AUTO: 13.2 % — SIGNIFICANT CHANGE UP (ref 2–14)
NEUTROPHILS # BLD AUTO: 2.14 K/UL — SIGNIFICANT CHANGE UP (ref 1.8–7.4)
NEUTROPHILS NFR BLD AUTO: 68.8 % — SIGNIFICANT CHANGE UP (ref 43–77)
NRBC # BLD: 0 /100 WBCS — SIGNIFICANT CHANGE UP (ref 0–0)
PLATELET # BLD AUTO: 209 K/UL — SIGNIFICANT CHANGE UP (ref 150–400)
POTASSIUM SERPL-MCNC: 4.2 MMOL/L — SIGNIFICANT CHANGE UP (ref 3.5–5.3)
POTASSIUM SERPL-SCNC: 4.2 MMOL/L — SIGNIFICANT CHANGE UP (ref 3.5–5.3)
PROT SERPL-MCNC: 8.3 G/DL — SIGNIFICANT CHANGE UP (ref 6–8.3)
RBC # BLD: 4.38 M/UL — SIGNIFICANT CHANGE UP (ref 3.8–5.2)
RBC # FLD: 18.1 % — HIGH (ref 10.3–14.5)
SODIUM SERPL-SCNC: 138 MMOL/L — SIGNIFICANT CHANGE UP (ref 135–145)
TSH SERPL-MCNC: 2.85 UIU/ML — SIGNIFICANT CHANGE UP (ref 0.27–4.2)
WBC # BLD: 3.11 K/UL — LOW (ref 3.8–10.5)
WBC # FLD AUTO: 3.11 K/UL — LOW (ref 3.8–10.5)

## 2024-03-13 PROCEDURE — 99214 OFFICE O/P EST MOD 30 MIN: CPT

## 2024-03-13 NOTE — HISTORY OF PRESENT ILLNESS
[Disease: _____________________] : Disease: [unfilled] [de-identified] : 52 y.o female with newly diagnosed with cervical cancer in [facility].\par  \par  \par  \par  Ms. Sharma, 52 years old, accompanied by her daughter, Tammy and is referred by Dr. Charles, for HSIL (7/20/22 from [facility]) cervical mass, thickened endometrium noted on CT (from [facility]). 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 \par  \par  Pt is without symptoms; she has not seen a GYN in over 10 years and recalls never having an abnormal pap smear. She returned home to the [facility] to help her mother with medical problems and decided to have a GYN checkup at that time. Pap smear returned HSIL / +HPV. Pathology noted SCC moderately differentiated and CT scan identified cervical mass; thickened lining of the uterus; lymphadenopathy as well as a right breast mass and right axillary lymphadenopathy. \par  \par  She denies f/c/n/v/d/abnormal vaginal bleeding, changes to bowel or bladder habits. Denies unintentional weight loss or gain. Denies post-coital or intercycle vaginal bleeding; denies abdominal pain, distention, bloating, back pain or any other associated symptoms. \par  \par  Imaging:\par  7/22/20 ([facility], [facility])\par  Breast: 1.6 x 1.7 cm tuberous, polycyclic contour, accumulating contrast medium, visualized at the borderline of the lower quadrants of the right breast, with no infiltration of the thoracic muscles. \par  Right axillary region: 1.4 x 1.0 cm; 1.0 x 1.0 cm and 0.8 x 1.1 cm. lymphadenopathy. \par  Liver: well defined even borders, the liver parenchyma is with signs of fat rearrangement, a hypdense inclusion 1.6 x 2.1 cm in size, is subcapsularly visualized Segment 8 of the liver. It lacunarly accumulates contrast medium, additionally accumulates contrast medium during the delayed phase. The intrahepatic and extrahepatic bile ducts are not dilated. \par  Abdominal lymph nodes: paraaortic and interaortocaval lymph nodes are sized 10-12 mm, weakly accumulate contrast medium. \par  No free fluid in the abdominal cavity. \par  Uterus: 5.9 x 7.5 cm. \par  EM: 15 mm with dense fluid visualized, mildly heterogeneous. \par  Cervix: tissue lesion 56 x 42 mm invading the uterine isthmus and body. The parametria are infiltrated. \par  Uterine appendages are normally positioned, a cystic inclusion of 3.3 x 3.5 cm is visualized in the projection of the left ovary. \par  Right Ovary: 2.0 x 2.8 cm. \par  Lymph Nodes:\par  - right common iliac lymph node: 1.1 x 1.5 cm.\par  - right pelvic wall node: 1.8 x 3.1 cm\par  - left pelvic wall node: 2.4 x 3.0 cm. \par  - right external iliac lymph node: 1.8 x 1.6 cm \par  \par  She was seen by Dr. Holm and had a biospy.\par  Pathology: Squamous cell cancer.\par  Patient had right breast biopsy yesterday, pending results.\par  \par  SH: No smoking or drinking. 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 \par  Menarche: 12\par  Menopause: still menstruating. LMP 7/30/22\par  \par  She completed C6 of chemotherapy for cervical cancer on 1/4/23 with Cisplatin/Taxol/Bevacizumab/Pembrolizumab. Myesha d/c after 3 cycles due to mediport dehiscence. PET scan showed a good response to treatment and she is now going to start maintenance pembrolizumab.\par  \par  Pet scan shows very good response in breast and lymph nodes from chemotherapy     (Cis/Taxol/Keytruda). Patient sees Dr. Perrin and Dr. Acosta for stage IIIC breast cancer.\par      Given Stage IV cervical cancer there is no indication for doing breast surgery at this time, as per the breast team. Patient started on Lupron and anastrozole. \par  \par  \par  \par  \par   [FreeTextEntry1] : Keytruda [de-identified] : Patient is here for follow up and treatment. She just completed pelvic RT, had MRI yesterday. She tolerated RT well, has no complaints. Good appetite and energy levels. Denies chest pain, SOB, cough, abdominal pain, nausea, vomiting, diarrhea, constipation, bleeding, rash, urinary symptoms.

## 2024-03-13 NOTE — REASON FOR VISIT
[Follow-Up Visit] : a follow-up [Pre-Treatment Visit] : a pre-treatment [Other: _____] : [unfilled] [FreeTextEntry2] : metastatic cervical cancer, breast cancer.

## 2024-03-14 NOTE — REASON FOR VISIT
[Other: _____] : [unfilled] [Follow-Up Visit] : a follow-up [FreeTextEntry2] : Cervical cancer and Breast cancer.

## 2024-03-14 NOTE — HISTORY OF PRESENT ILLNESS
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  Pathology noted SCCA, moderately differentiated 7/22/22 CT scan in [facility] identified cervical mass, 5.6 x 4.2 cm, invading the uterine isthmus and body. The parametria are infiltrated. Thickened uterine lining. Lymphadenopathy with right common iliac lymph node: 1.1 x 1.5 cm, right pelvic wall node: 1.8 x 3.1 cm, left pelvic wall node: 2.4 x 3.0 cm, right external iliac lymph node: 1.8 x 1.6 cm  CT scan also reported a 1.6 x 1.7 cm tuberous mass at the borderline of the lower quadrant of the right breast, with no infiltration of the thoracic muscles.  Right axillary lymphadenopathy measuring 1.4 x 1.0 cm, 1.0 x 1.0 cm and 0.8 x 1.1 cm.  8/10/22 Cervical biopsy revealed high grade squamous intraepithelial lesion (HGSIL) (CIN3) with PD-L1 Tumor Proportion Score (TPS) 30%, positive 8/18/22 PET/CT scan showed a large area of intense activity within the uterine cavity extending into the uterine cervix with SUV 19.5. FDG avid retroperitoneal and bilateral pelvic lymphadenopathy (SUV 7.4 - 21.1). FDG avid right breast nodule (SUV 13.9) and right retropectoral (SUV 6.1), axillary (SUV 18.2) and internal mammary lymph nodes (SUV 5.8) suspicious for metastasis from breast. Mildly FDG avid prevascular and left retropectoral LN are indeterminate. Nonspecific FDG avidity in bilateral palatine tonsils, probably inflammatory. Nonspecific generalized BM hypermetabolism without CT correlate, probably reactive and may be related to anemia. 8/19/22 Mammogram and sonogram revealed heterogeneously dense breast parenchyma and an irregularly marginated mass in the upper outer quadrant  of the right breast with adjacent clusters of indeterminate calcifications. Sonogram revealed a 1.0 x 1.2 cm mass at 10:00. BI-RADS 5 8/23/22 Right breast biopsy revealed moderately to poorly differentiated IDC with extensive lymphovascular invasion, ER 95%, PA 95%, HER-2 2+ FISH negative 8/26/22 Right axillary LN revealed metastatic adenocarcinoma c/w breast origin.  8/26/22 Left supraclavicular LN positive for metastatic squamous cell carcinoma consistent with Stage IV cervical cancer 9/1/22 - 1/4/23 Chemotherapy for cervical cancer with 6 cycles of Cisplatin/Taxol/Bevacizumab/Pembrolizumab. Bevacizumab d/c after 3 cycles due to mediport dehiscence 1/11/23 PET/CT scan showed interval response to therapy with residual low level FDG avidity in lymph nodes in the pelvis similar to blood pool and no new lesions. Interval resolution of FDG-avid right breast lesion and retropectoral, right axillary, mediastinal, and right internal mammary lymph nodes. Low-level symmetrical FDG avidity in the christopher, nonspecific.  No FDG avid lymph nodes in the axilla above background. Interval decrease in size and FDG avidity of pelvic lymphadenopathy. Reference nodes: -Interval resolution of right internal iliac node; previously measuring approximately 1.1 cm, SUV 14. -Right external iliac mass, SUV 3.7 measuring 1.4 x 0.7 cm, image 220; previously 3 x 1.9 cm, SUV 21. -Right internal iliac node, no discernible FDG avidity, 0.8 x 0.6 cm, image 217; previously SUV 7.4, 1.6 x 1.5 cm. -Left pelvic sidewall mass, SUV 3.3, 1.7 x 0.6 cm, image 224; previously SUV 20, 3.4 x 2.2 cm (SUV 20.2; image 231). 9/14/22 - 1/4/23 Cisplatin/Taxol/Bevacizumab/Pembrolizumab for cervical cancer. Bevacizumab d/c after 3 cycles due to mediport dehiscence.  1/11/23 PET scan showed a good response to treatment. 2/1/23 Maintenance pembrolizumab started and to continue every 6 weeks. 2/23 Lupron started and to continue every 3 months with anastrozole 1 mg daily.   [de-identified] : Shari follows up for her breast cancer and is seen today accompanied by her daughter Tammy, who also served as an . She completed C6 of chemotherapy for cervical cancer on 1/4/23 with Cisplatin/Taxol/Bevacizumab/Pembrolizumab. Myesha d/c after 3 cycles due to mediport dehiscence.  PET scan showed a good response to treatment and she started maintenance pembrolizumab every 3 weeks, later decreased to every 6 weeks. PET scan from 12/13/23 showed intense activity in the upper cervix extending to the lower uterus with hypermetabolic right external iliac nodes suspicious for recurrence. This was reviewed by Dr. Charles and she was referred to RT for further evaluation and management. She started Lupron and anastrozole in 2/23 and is tolerating them very well with mild arthralgias and rare hot flashes. She is stiff in the morning but feels better with exercise. 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 Before starting Lupron her periods were very regular, coming monthly. She is on maintenance pembrolizumab every 6 weeks and had an MRI pelvis on 3/12/24.  HEALTH MAINTENANCE: PCP: Dr. Heather Kumari Mammogram and breast ultrasound: 8/14/23 BI-RADS 4A Known biopsy-proven cancer in the right 10:00 location in association with clip and numerous calcifications spanning at least 5 cm on the mammogram for which appropriate surgical and oncologic management is recommended. A6 mm nodule in the right 2:00 location for which ultrasound guided core biopsy is again recommended. Findings and recommendations were conveyed to the patient her daughter as well as Dr. Acosta on 8/14/2023. The ultrasound-guided core biopsy is not being performed at the current time as per Dr. Acosta given patient's clinical extent of disease Bone density: 9/13/23 showed T scores of -1.2 in spine, -1.8 in femoral neck and -1.2 in total hip Pap Smear: 7/22 Colonoscopy: none

## 2024-03-27 ENCOUNTER — APPOINTMENT (OUTPATIENT)
Dept: GYNECOLOGIC ONCOLOGY | Facility: CLINIC | Age: 54
End: 2024-03-27

## 2024-04-11 ENCOUNTER — OUTPATIENT (OUTPATIENT)
Dept: OUTPATIENT SERVICES | Facility: HOSPITAL | Age: 54
LOS: 1 days | Discharge: HOME | End: 2024-04-11

## 2024-04-11 DIAGNOSIS — C53.9 MALIGNANT NEOPLASM OF CERVIX UTERI, UNSPECIFIED: ICD-10-CM

## 2024-04-17 ENCOUNTER — APPOINTMENT (OUTPATIENT)
Dept: GYNECOLOGIC ONCOLOGY | Facility: CLINIC | Age: 54
End: 2024-04-17
Payer: COMMERCIAL

## 2024-04-17 VITALS
DIASTOLIC BLOOD PRESSURE: 80 MMHG | BODY MASS INDEX: 28.32 KG/M2 | HEART RATE: 68 BPM | SYSTOLIC BLOOD PRESSURE: 125 MMHG | HEIGHT: 61 IN | WEIGHT: 150 LBS

## 2024-04-17 PROCEDURE — 99459 PELVIC EXAMINATION: CPT

## 2024-04-17 PROCEDURE — 99214 OFFICE O/P EST MOD 30 MIN: CPT

## 2024-04-17 NOTE — DISCUSSION/SUMMARY
[Reviewed Clinical Lab Test(s)] : Results of clinical tests were reviewed. [Reviewed Radiology Report(s)] : Radiology reports were reviewed. [Reviewed Radiology Film/Image(s)] : Images from radiology studies were reviewed and examined. [FreeTextEntry1] : Patient is doing well, clinically stable cervical cancer recent CAT scan concerning for one lymph node that is slightly incidental enlarged.   continue maintenance pembro s/p RT f/u 3 months continue hormonal tx for breast CA, on anastrazole and lupron q3 mo patient is in good spirits, most of her family in [facility], just visited [place] all questions answered

## 2024-04-17 NOTE — PHYSICAL EXAM
[Chaperone Present] : A chaperone was present in the examining room during all aspects of the physical examination [Normal] : Recto-Vaginal Exam: Normal [Fully active, able to carry on all pre-disease performance without restriction] : Status 0 - Fully active, able to carry on all pre-disease performance without restriction [[id number]] : A chaperone was present during the pelvic exam. [de-identified] : cervix is visualized, no clear mass on exam [de-identified] : not palpable [de-identified] : smooth, free b/l [de-identified] : No culdesac nodules

## 2024-04-17 NOTE — HISTORY OF PRESENT ILLNESS
[FreeTextEntry1] : Med Onc/ Ref:  Dr. Rain Charles PCP:  Dr. Heather Kumari Breast Med Onc:  Dr. Kaye Perrin Breast Surgeon:  Dr. Acosta.   Ms. Sharma, 54 years old, accompanied by her daughter, Tammy and is referred by Dr. Charles, presented with Stage IV cervical cancer 8/2022 and metastatic breast cancer. Patient completed Cis/Taxol/Avastin, Pembro, and now on Pembro maintenance. Continues on Tamoxifen and lupron. Overall feeling better, tolerating treatment. no bleeding.   She completed RT to the cervix/pelvis on 3/11/2024.  Labs: 3/13/2024:  = 16 11/8/2023:  = 17 8/16/2023:  = 15 5/26/23:   = 15  Imaging: 3/12/2024 MRI abdomen and pelvis ([facility]) LOWER CHEST: Within normal limits. LIVER: A 1.9 cm posterior right hepatic lobe hemangioma without change. BILE DUCTS: Normal caliber. GALLBLADDER: Within normal limits. SPLEEN: Within normal limits. PANCREAS: Within normal limits. ADRENALS: Within normal limits. KIDNEYS/URETERS: No hydronephrosis. BLADDER: Within normal limits. REPRODUCTIVE ORGANS: A 9 mm focus hypoenhancement in the right aspect of the cervix with signal at diffusion weighted imaging (26:15, 16:51) may represent residual cervical tumor. This is poorly delineated at T2-weighted imaging. No myometrial mass. No adnexal mass. BOWEL: No bowel obstruction. PERITONEUM: No ascites. VESSELS: Within normal limits. RETROPERITONEUM/LYMPH NODES: New para-aortic, aortocaval and paracaval adenopathy. Reference is as follows: Paracaval node (2:27) 1.6 x 1.0 cm. Aortocaval node (2:26) 1.2 x 0.9 cm. Right external iliac adenopathy which is partially necrotic likely reflecting treatment change: Superior right external iliac node (14:7) 1.2 x 0.8 cm, previously 1.7 x 1.3 cm. Inferior right external iliac node (12:8) 1.7 x 1.1 cm previously 2.0 x 1.4 cm. ABDOMINAL WALL: Within normal limits. BONES: Within normal limits. IMPRESSION: New upper retroperitoneal adenopathy to include para-aortic, aortocaval and paracaval adenopathy. Right external iliac adenopathy decreased in size and now partially necrotic likely reflecting treatment change. A subcentimeter focus in the right aspect of the cervix may represent residual cervical tumor. Overall tumor is decreased when compared with prior PET CT.  12/13/2023 PET/CT ([facility]) 1. Intense activity in the upper cervix extending to the lower uterus with hypermetabolic right external iliac nodes suspicious for recurrence; please correlate findings with recent cervical biopsy. 2. No evidence of locally recurrent FDG avid malignant breast lesion.  Current Treatment:  Cisplatin/Taxol/Myesha/Pembro C1 = 9/14/22 C2 = 10/5/22 C3 = 10/26/22 C4 - 11/16/22 C5 - 12/14/22 C6 - 1/4/23.  Now on Lupron and anastrazole.   Pembro maintenance now Patient started on Lupron and anastrozole for breast cancer  Health Maintenance: BMI: 27 Lifestyle:  sexually active.   COVID vaccine received:  yes Mammogram: 8/14/23:  BIRADS 4a - monitored by Dr Acosta Colonoscopy:none PAP: 7/20/2022:  +HPV 16; HSIL

## 2024-04-18 ENCOUNTER — APPOINTMENT (OUTPATIENT)
Dept: RADIATION ONCOLOGY | Facility: CLINIC | Age: 54
End: 2024-04-18

## 2024-04-18 VITALS
OXYGEN SATURATION: 97 % | WEIGHT: 155 LBS | BODY MASS INDEX: 29.29 KG/M2 | DIASTOLIC BLOOD PRESSURE: 75 MMHG | RESPIRATION RATE: 16 BRPM | HEART RATE: 71 BPM | TEMPERATURE: 96.2 F | SYSTOLIC BLOOD PRESSURE: 111 MMHG

## 2024-04-18 NOTE — REVIEW OF SYSTEMS
[Constipation: Grade 0] : Constipation: Grade 0 [Diarrhea: Grade 0] : Diarrhea: Grade 0 [Rectal Pain: Grade 0] : Rectal Pain: Grade 0 [Fatigue: Grade 0] : Fatigue: Grade 0 [Hematuria: Grade 0] : Hematuria: Grade 0 [Urinary Incontinence: Grade 0] : Urinary Incontinence: Grade 0  [Urinary Retention: Grade 0] : Urinary Retention: Grade 0 [Urinary Tract Pain: Grade 0] : Urinary Tract Pain: Grade 0 [Urinary Urgency: Grade 0] : Urinary Urgency: Grade 0 [Urinary Frequency: Grade 0] : Urinary Frequency: Grade 0 [Genital Edema: Grade 0] : Genital Edema: Grade 0 [Vaginal Stricture: Grade 0] : Vaginal Stricture: Grade 0 [Vaginal Infection: Grade 0] : Vaginal Infection: Grade 0

## 2024-04-24 ENCOUNTER — RESULT REVIEW (OUTPATIENT)
Age: 54
End: 2024-04-24

## 2024-04-24 ENCOUNTER — APPOINTMENT (OUTPATIENT)
Dept: HEMATOLOGY ONCOLOGY | Facility: CLINIC | Age: 54
End: 2024-04-24
Payer: COMMERCIAL

## 2024-04-24 ENCOUNTER — APPOINTMENT (OUTPATIENT)
Dept: INFUSION THERAPY | Facility: HOSPITAL | Age: 54
End: 2024-04-24

## 2024-04-24 ENCOUNTER — NON-APPOINTMENT (OUTPATIENT)
Age: 54
End: 2024-04-24

## 2024-04-24 DIAGNOSIS — R11.2 NAUSEA WITH VOMITING, UNSPECIFIED: ICD-10-CM

## 2024-04-24 DIAGNOSIS — Z51.11 ENCOUNTER FOR ANTINEOPLASTIC CHEMOTHERAPY: ICD-10-CM

## 2024-04-24 LAB
ALBUMIN SERPL ELPH-MCNC: 4.4 G/DL — SIGNIFICANT CHANGE UP (ref 3.3–5)
ALP SERPL-CCNC: 107 U/L — SIGNIFICANT CHANGE UP (ref 40–120)
ALT FLD-CCNC: 49 U/L — HIGH (ref 10–45)
ANION GAP SERPL CALC-SCNC: 12 MMOL/L — SIGNIFICANT CHANGE UP (ref 5–17)
AST SERPL-CCNC: 44 U/L — HIGH (ref 10–40)
BASOPHILS # BLD AUTO: 0.03 K/UL — SIGNIFICANT CHANGE UP (ref 0–0.2)
BASOPHILS NFR BLD AUTO: 0.7 % — SIGNIFICANT CHANGE UP (ref 0–2)
BILIRUB SERPL-MCNC: 0.4 MG/DL — SIGNIFICANT CHANGE UP (ref 0.2–1.2)
BUN SERPL-MCNC: 21 MG/DL — SIGNIFICANT CHANGE UP (ref 7–23)
CALCIUM SERPL-MCNC: 9.8 MG/DL — SIGNIFICANT CHANGE UP (ref 8.4–10.5)
CANCER AG125 SERPL-ACNC: 19 U/ML — SIGNIFICANT CHANGE UP
CHLORIDE SERPL-SCNC: 104 MMOL/L — SIGNIFICANT CHANGE UP (ref 96–108)
CO2 SERPL-SCNC: 23 MMOL/L — SIGNIFICANT CHANGE UP (ref 22–31)
CREAT SERPL-MCNC: 0.75 MG/DL — SIGNIFICANT CHANGE UP (ref 0.5–1.3)
EGFR: 95 ML/MIN/1.73M2 — SIGNIFICANT CHANGE UP
EOSINOPHIL # BLD AUTO: 0.2 K/UL — SIGNIFICANT CHANGE UP (ref 0–0.5)
EOSINOPHIL NFR BLD AUTO: 4.5 % — SIGNIFICANT CHANGE UP (ref 0–6)
GLUCOSE SERPL-MCNC: 99 MG/DL — SIGNIFICANT CHANGE UP (ref 70–99)
HCT VFR BLD CALC: 34.6 % — SIGNIFICANT CHANGE UP (ref 34.5–45)
HGB BLD-MCNC: 11.3 G/DL — LOW (ref 11.5–15.5)
IMM GRANULOCYTES NFR BLD AUTO: 0.2 % — SIGNIFICANT CHANGE UP (ref 0–0.9)
LYMPHOCYTES # BLD AUTO: 1.03 K/UL — SIGNIFICANT CHANGE UP (ref 1–3.3)
LYMPHOCYTES # BLD AUTO: 23.3 % — SIGNIFICANT CHANGE UP (ref 13–44)
MCHC RBC-ENTMCNC: 27.2 PG — SIGNIFICANT CHANGE UP (ref 27–34)
MCHC RBC-ENTMCNC: 32.7 G/DL — SIGNIFICANT CHANGE UP (ref 32–36)
MCV RBC AUTO: 83.2 FL — SIGNIFICANT CHANGE UP (ref 80–100)
MONOCYTES # BLD AUTO: 0.48 K/UL — SIGNIFICANT CHANGE UP (ref 0–0.9)
MONOCYTES NFR BLD AUTO: 10.9 % — SIGNIFICANT CHANGE UP (ref 2–14)
NEUTROPHILS # BLD AUTO: 2.67 K/UL — SIGNIFICANT CHANGE UP (ref 1.8–7.4)
NEUTROPHILS NFR BLD AUTO: 60.4 % — SIGNIFICANT CHANGE UP (ref 43–77)
NRBC # BLD: 0 /100 WBCS — SIGNIFICANT CHANGE UP (ref 0–0)
PLATELET # BLD AUTO: 214 K/UL — SIGNIFICANT CHANGE UP (ref 150–400)
POTASSIUM SERPL-MCNC: 4.6 MMOL/L — SIGNIFICANT CHANGE UP (ref 3.5–5.3)
POTASSIUM SERPL-SCNC: 4.6 MMOL/L — SIGNIFICANT CHANGE UP (ref 3.5–5.3)
PROT SERPL-MCNC: 7.9 G/DL — SIGNIFICANT CHANGE UP (ref 6–8.3)
RBC # BLD: 4.16 M/UL — SIGNIFICANT CHANGE UP (ref 3.8–5.2)
RBC # FLD: 16.6 % — HIGH (ref 10.3–14.5)
SODIUM SERPL-SCNC: 138 MMOL/L — SIGNIFICANT CHANGE UP (ref 135–145)
TSH SERPL-MCNC: 3.1 UIU/ML — SIGNIFICANT CHANGE UP (ref 0.27–4.2)
WBC # BLD: 4.42 K/UL — SIGNIFICANT CHANGE UP (ref 3.8–10.5)
WBC # FLD AUTO: 4.42 K/UL — SIGNIFICANT CHANGE UP (ref 3.8–10.5)

## 2024-04-24 PROCEDURE — 99214 OFFICE O/P EST MOD 30 MIN: CPT

## 2024-04-25 NOTE — HISTORY OF PRESENT ILLNESS
[Disease: _____________________] : Disease: [unfilled] [AJCC Stage: ____] : AJCC Stage: [unfilled] [de-identified] : 52 y.o female with newly diagnosed with cervical cancer in [facility].\par  \par  \par  \par  Ms. Sharma, 52 years old, accompanied by her daughter, Tammy and is referred by Dr. Charles, for HSIL (7/20/22 from [facility]) cervical mass, thickened endometrium noted on CT (from [facility]). 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 \par  \par  Pt is without symptoms; she has not seen a GYN in over 10 years and recalls never having an abnormal pap smear. She returned home to the [facility] to help her mother with medical problems and decided to have a GYN checkup at that time. Pap smear returned HSIL / +HPV. Pathology noted SCC moderately differentiated and CT scan identified cervical mass; thickened lining of the uterus; lymphadenopathy as well as a right breast mass and right axillary lymphadenopathy. \par  \par  She denies f/c/n/v/d/abnormal vaginal bleeding, changes to bowel or bladder habits. Denies unintentional weight loss or gain. Denies post-coital or intercycle vaginal bleeding; denies abdominal pain, distention, bloating, back pain or any other associated symptoms. \par  \par  Imaging:\par  7/22/20 ([facility], [facility])\par  Breast: 1.6 x 1.7 cm tuberous, polycyclic contour, accumulating contrast medium, visualized at the borderline of the lower quadrants of the right breast, with no infiltration of the thoracic muscles. \par  Right axillary region: 1.4 x 1.0 cm; 1.0 x 1.0 cm and 0.8 x 1.1 cm. lymphadenopathy. \par  Liver: well defined even borders, the liver parenchyma is with signs of fat rearrangement, a hypdense inclusion 1.6 x 2.1 cm in size, is subcapsularly visualized Segment 8 of the liver. It lacunarly accumulates contrast medium, additionally accumulates contrast medium during the delayed phase. The intrahepatic and extrahepatic bile ducts are not dilated. \par  Abdominal lymph nodes: paraaortic and interaortocaval lymph nodes are sized 10-12 mm, weakly accumulate contrast medium. \par  No free fluid in the abdominal cavity. \par  Uterus: 5.9 x 7.5 cm. \par  EM: 15 mm with dense fluid visualized, mildly heterogeneous. \par  Cervix: tissue lesion 56 x 42 mm invading the uterine isthmus and body. The parametria are infiltrated. \par  Uterine appendages are normally positioned, a cystic inclusion of 3.3 x 3.5 cm is visualized in the projection of the left ovary. \par  Right Ovary: 2.0 x 2.8 cm. \par  Lymph Nodes:\par  - right common iliac lymph node: 1.1 x 1.5 cm.\par  - right pelvic wall node: 1.8 x 3.1 cm\par  - left pelvic wall node: 2.4 x 3.0 cm. \par  - right external iliac lymph node: 1.8 x 1.6 cm \par  \par  She was seen by Dr. Holm and had a biospy.\par  Pathology: Squamous cell cancer.\par  Patient had right breast biopsy yesterday, pending results.\par  \par  SH: No smoking or drinking. 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 \par  Menarche: 12\par  Menopause: still menstruating. LMP 7/30/22\par  \par  She completed C6 of chemotherapy for cervical cancer on 1/4/23 with Cisplatin/Taxol/Bevacizumab/Pembrolizumab. Myesha d/c after 3 cycles due to mediport dehiscence. PET scan showed a good response to treatment and she is now going to start maintenance pembrolizumab.\par  \par  Pet scan shows very good response in breast and lymph nodes from chemotherapy     (Cis/Taxol/Keytruda). Patient sees Dr. Perrin and Dr. Acosta for stage IIIC breast cancer.\par      Given Stage IV cervical cancer there is no indication for doing breast surgery at this time, as per the breast team. Patient started on Lupron and anastrozole. \par  \par  \par  \par  \par   [Treatment Protocol] : Treatment Protocol [FreeTextEntry1] : Jean-Pierre [de-identified] : Patient here for a follow up. She feels well. Denies any pain, not bowel or bladder issues. MRI of Abdomen and Pelvis done after RT in [place], 2024 showed:  ew upper retroperitoneal adenopathy to include para-aortic, aortocaval and paracaval adenopathy.  Right external iliac adenopathy decreased in size and now partially necrotic likely reflecting treatment change.  A Sub centimeter focus in the right aspect of the cervix may represent residual cervical tumor. Overall tumor is decreased when compared with prior PET CT.

## 2024-05-01 NOTE — REASON FOR VISIT
[Post-Treatment Evaluation] : post-treatment evaluation for [Other: ___] : [unfilled] [Other: _____] : [unfilled] [Patient Declined  Services] : - None: Patient declined  services [Interpreters_FullName] : Tammy [Interpreters_Relationshiptopatient] : Daughter [FreeTextEntry3] : Sara [TWNoteComboBox1] : Other

## 2024-05-01 NOTE — PHYSICAL EXAM
[] : no respiratory distress [Abdomen Soft] : soft [Normal External Genitalia] : normal external genitalia  [Normal Vagina] : normal vagina without lesions or masses [Normal Cervix] : normal cervix without masses

## 2024-05-01 NOTE — HISTORY OF PRESENT ILLNESS
[FreeTextEntry1] : MARISOL OROSCO is a 53-year-old F w/ metastatic cervical cancer & synchronous right breast IDC (ER+/[place]+/HER2-) with left supraclavicular LN biopsy positive for metastatic SCC c/w stage IV cervical cancer, started on cisplatin/Taxol/Keytruda for cervical ca, with response, resolution of LNs avidity. However, recent PET scan was concerning for one external iliac lymph node as well as recurrence in the primary site. Prior PET with no residual hypermetabolism in the primary. She is on Pembro and hormonal tx (anastrozole and Lupron) for breast CA.  She is accompanied by her daughter who assists with translation as needed.    3/11/2024 Completed RT to cervix/pelvis total dose of 5936cGy in 28 fractions.   3/12/2024 MRI Abdomen and Pelvis: IMPRESSION: New upper retroperitoneal adenopathy to include para-aortic, aortocaval and paracaval adenopathy. Right external iliac adenopathy decreased in size and now partially necrotic likely reflecting treatment change. A subcentimeter focus in the right aspect of the cervix may represent residual cervical tumor. Overall tumor is decreased when compared with prior PET CT.  Presents today for post treatment evaluation. Doing well. Denies pain, vaginal bleeding or discharge. No bowel or urinary complaints. Saw Dr. Holm 4/17/2024 pelvic exam ROYA.

## 2024-05-15 NOTE — PHYSICAL EXAM
Message from OrderWithMet:  Original authorizing provider: MD Angela Beltran would like a refill of the following medications:  budesonide (PULMICORT) 0.5 MG/2ML neb solution [Mia Quinteros MD]    Preferred pharmacy: [facility] [id number] [place] [address] AT [facility] OF [address]    Comment:  This message is being sent by Fidel Brewer on behalf of Angela Celeste There is not a refill at the pharmacy. Please refill   [Normal] : supple, no neck mass and thyroid not enlarged [Normal Neck Lymph Nodes] : normal neck lymph nodes  [Normal Supraclavicular Lymph Nodes] : normal supraclavicular lymph nodes [Normal Axillary Lymph Nodes] : normal axillary lymph nodes [Normal] : oriented to person, place and time, with appropriate affect [de-identified] : no masses

## 2024-05-15 NOTE — ASSESSMENT
[FreeTextEntry1] : IMP: 54yo F w/ metastatic cervical cancer & synchronous right breast IDC (ER+/IL+/HER2-) 8/26/22- right axillary LN metastatic adenocarcinoma c/w breast origin 8/26/22- left supraclavicular LN positive for metastatic SCC c/w stage IV cervical cancer  on cisplatin/Taxol/Keytruda for cervical ca (completed 6 cycles as of 1/4/2023) Avastin D/Alfredo r/t port dehiscence  PET/CT 1/11/23- Compared with PET scan 8/8/2022, there has been interval response to therapy. Residual low-level FDG avidity in lymph nodes in the pelvis is similar to blood pool. No new lesions.  Started maintenance Keytruda 2/2023 (w/ Dr. Charles) along with Lupron and Anastrozole w/ Dr. Perrin  CT C/A/P 8/2023- - focal enhancement at the lower uterine segment and cervix, not seen on the prior CT 3/20/2023, not well characterized on CT. Pelvic ultrasound or MRI is recommended for further evaluation. - A right external iliac chain lymph node measures up to 0.8 cm in short axis, slightly increased in size compared to the prior CT where it measured 0.3 cm at a similar level. However, there is no abdominopelvic lymphadenopathy by size criteria. Continued attention on follow-up is advised.  B/L mammo/sono 8/2023 -  - Known biopsy-proven cancer in the right 10:00 location in association with clip and numerous calcifications spanning at least 5 cm on the mammogram for which appropriate surgical and oncologic management is recommended. - 6 mm nodule in the right 2:00 location for which ultrasound guided core biopsy is again recommended. BIRADS 4A **no biopsy done given extent of disease  PLAN: No role for breast surgery at this time RTO 3 months continue Keytruda and Lupron & Arimidex

## 2024-05-15 NOTE — HISTORY OF PRESENT ILLNESS
[de-identified] : Ms. MARISOL OROSCO is a 54 year old woman, primarily [place] speaking, who presents today for a follow up for breast cancer. Accompanied by her daughter, Tammy, who the patient prefers to translate for her.   B/L mammo 7/26/22 ([facility]) with benign findings to left breast (BIRADS 2) and right breast shows a suspicious but probably benign lesion (BIRADS 3).  She returned to [place] from the [facility] to establish care after having a biopsy in [facility] that was c/w cervical cancer. She established care with Dr. Radha Holm who repeated a cervical biopsy (path shows high grade squamous intraepithelial lesion to cervix, fragments of SCC w/ extensive tumor necrosis to endocervix) Dr. Holm also ordered PET/CT & further breast imaging   PET/CT 8/18/22 shows large area of intense activity w/in the uterine cavity corresponding to known uterine malignancy, nonspecific focal FDG activity in vaginal region, concerning for disease involvement. FDG avid retroperitoneal & B/L pelvic lymphadenopathy, c/w metastases from uterus. FDG avid right breast nodule likely malignancy. FDG avid right retropectoral, right axillary and right internal mammary nodes, suspicious for metastasis from breast malignancy. Mildly FDG avid prevascular and left retropectoral lymph nodes are indeterminate. Nonspecific approx symmetric FDG avidity in b/l palatine tonsils, probably inflammatory. Nonspecific generalized bone marrow hypermetabolism w/o CT correlate, probably reactive and may be related to anemia.  right breast diagnostic mammo/ bilateral sono to follow-up on [facility] abnormal imaging, done on 8/19/2022.  US showed suspicious mass to right breast 10:00 4 cm fn, US biopsy recommended, highly suspicious for malignancy, BI-RADS 5.  8/23/22, right breast US biopsy, 10:00 4 cm fn, path: moderately to poorly differentiated invasive ductal carcinoma with mucinous and micropapillary features, 1.4 cm, intermediate grade DCIS, cribriform pattern, extensive lymphovascular invasion present, microcalcifications present in malignant and benign epithelium. ER+/KY+/HER2 negative by FISH  8/26/22- right axillary LN metastatic adenocarcinoma c/w breast origin  8/26/22- left supraclavicular LN positive for metastatic SCC c/w stage IV cervical cancer  CT chest/abd/pelvis 11/15/22: resolved previously seen lymphadenopathy. No evidence for residual right breast mass however dedicated right breast imaging or PET/CT would be more sensitive. Decreased overall size of the cervical mass which would be best assessed by pelvic MRI.   Family history of father with stomach cancer.   B/L mammo/sono 1/10/23 showed biopsy proven right breast (10:00) carcinoma, decreased in size & stable calcifications, indeterminate right breast mass to 2:00- U/S biopsy recommended, BI-RADS 4A  completed 6 cycles of chemo for cervical cancer 1/2023   PET/CT 1/11/23- Compared with PET scan 8/8/2022, there has been interval response to therapy. Residual low-level FDG avidity in lymph nodes in the pelvis is similar to blood pool. No new lesions. FDG-avidity in the soft tissues around the Mediport removal, query soft tissue infection.  CT chest/abd/pelvis 3/20/23: ROYA  Started maintenance Keytruda 2/2023 (w/ Dr. Charles) along with Lupron and Anastrazole w/ Dr. Perrin  DM/US on 8/14/2023 shows biopsy proven mass (10:00) with numerous calcifications spanning at least 5 cm. 6 mm nodule in the right 2:00, USGB recommended. After discussion with myself and the radiologist, USGB will not be performed at the current time given extent of disease. BI-RADS 4 A  CT C/A/P on 11/21/2023: New right external iliac adenopathy.  PET 12/18/2023:  1. Intense activity in the upper cervix extending to the lower uterus with hypermetabolic right external iliac nodes suspicious for recurrence; please correlate findings with recent cervical biopsy. 2. No evidence of locally recurrent FDG avid malignant breast lesion.   Given Stage IV cervical cancer there is no indication for doing breast surgery at this time, as per the breast team.

## 2024-05-20 ENCOUNTER — APPOINTMENT (OUTPATIENT)
Dept: SURGICAL ONCOLOGY | Facility: CLINIC | Age: 54
End: 2024-05-20

## 2024-06-05 ENCOUNTER — RESULT REVIEW (OUTPATIENT)
Age: 54
End: 2024-06-05

## 2024-06-05 ENCOUNTER — APPOINTMENT (OUTPATIENT)
Dept: INFUSION THERAPY | Facility: HOSPITAL | Age: 54
End: 2024-06-05

## 2024-06-05 ENCOUNTER — APPOINTMENT (OUTPATIENT)
Dept: HEMATOLOGY ONCOLOGY | Facility: CLINIC | Age: 54
End: 2024-06-05
Payer: COMMERCIAL

## 2024-06-05 DIAGNOSIS — T45.1X5A PAIN IN UNSPECIFIED JOINT: ICD-10-CM

## 2024-06-05 DIAGNOSIS — R59.1 GENERALIZED ENLARGED LYMPH NODES: ICD-10-CM

## 2024-06-05 DIAGNOSIS — M25.50 PAIN IN UNSPECIFIED JOINT: ICD-10-CM

## 2024-06-05 LAB
ALBUMIN SERPL ELPH-MCNC: 4.5 G/DL — SIGNIFICANT CHANGE UP (ref 3.3–5)
ALP SERPL-CCNC: 103 U/L — SIGNIFICANT CHANGE UP (ref 40–120)
ALT FLD-CCNC: 30 U/L — SIGNIFICANT CHANGE UP (ref 10–45)
ANION GAP SERPL CALC-SCNC: 13 MMOL/L — SIGNIFICANT CHANGE UP (ref 5–17)
AST SERPL-CCNC: 32 U/L — SIGNIFICANT CHANGE UP (ref 10–40)
BASOPHILS # BLD AUTO: 0.03 K/UL — SIGNIFICANT CHANGE UP (ref 0–0.2)
BASOPHILS NFR BLD AUTO: 0.8 % — SIGNIFICANT CHANGE UP (ref 0–2)
BILIRUB SERPL-MCNC: 0.3 MG/DL — SIGNIFICANT CHANGE UP (ref 0.2–1.2)
BUN SERPL-MCNC: 17 MG/DL — SIGNIFICANT CHANGE UP (ref 7–23)
CALCIUM SERPL-MCNC: 9.9 MG/DL — SIGNIFICANT CHANGE UP (ref 8.4–10.5)
CANCER AG125 SERPL-ACNC: 21 U/ML — SIGNIFICANT CHANGE UP
CHLORIDE SERPL-SCNC: 102 MMOL/L — SIGNIFICANT CHANGE UP (ref 96–108)
CO2 SERPL-SCNC: 25 MMOL/L — SIGNIFICANT CHANGE UP (ref 22–31)
CREAT SERPL-MCNC: 0.81 MG/DL — SIGNIFICANT CHANGE UP (ref 0.5–1.3)
EGFR: 86 ML/MIN/1.73M2 — SIGNIFICANT CHANGE UP
EOSINOPHIL # BLD AUTO: 0.19 K/UL — SIGNIFICANT CHANGE UP (ref 0–0.5)
EOSINOPHIL NFR BLD AUTO: 4.9 % — SIGNIFICANT CHANGE UP (ref 0–6)
GLUCOSE SERPL-MCNC: 83 MG/DL — SIGNIFICANT CHANGE UP (ref 70–99)
HCT VFR BLD CALC: 34.6 % — SIGNIFICANT CHANGE UP (ref 34.5–45)
HGB BLD-MCNC: 11.1 G/DL — LOW (ref 11.5–15.5)
IMM GRANULOCYTES NFR BLD AUTO: 0.3 % — SIGNIFICANT CHANGE UP (ref 0–0.9)
LYMPHOCYTES # BLD AUTO: 0.89 K/UL — LOW (ref 1–3.3)
LYMPHOCYTES # BLD AUTO: 23.1 % — SIGNIFICANT CHANGE UP (ref 13–44)
MCHC RBC-ENTMCNC: 26.7 PG — LOW (ref 27–34)
MCHC RBC-ENTMCNC: 32.1 G/DL — SIGNIFICANT CHANGE UP (ref 32–36)
MCV RBC AUTO: 83.4 FL — SIGNIFICANT CHANGE UP (ref 80–100)
MONOCYTES # BLD AUTO: 0.43 K/UL — SIGNIFICANT CHANGE UP (ref 0–0.9)
MONOCYTES NFR BLD AUTO: 11.1 % — SIGNIFICANT CHANGE UP (ref 2–14)
NEUTROPHILS # BLD AUTO: 2.31 K/UL — SIGNIFICANT CHANGE UP (ref 1.8–7.4)
NEUTROPHILS NFR BLD AUTO: 59.8 % — SIGNIFICANT CHANGE UP (ref 43–77)
NRBC # BLD: 0 /100 WBCS — SIGNIFICANT CHANGE UP (ref 0–0)
PLATELET # BLD AUTO: 226 K/UL — SIGNIFICANT CHANGE UP (ref 150–400)
POTASSIUM SERPL-MCNC: 4.1 MMOL/L — SIGNIFICANT CHANGE UP (ref 3.5–5.3)
POTASSIUM SERPL-SCNC: 4.1 MMOL/L — SIGNIFICANT CHANGE UP (ref 3.5–5.3)
PROT SERPL-MCNC: 8 G/DL — SIGNIFICANT CHANGE UP (ref 6–8.3)
RBC # BLD: 4.15 M/UL — SIGNIFICANT CHANGE UP (ref 3.8–5.2)
RBC # FLD: 14.1 % — SIGNIFICANT CHANGE UP (ref 10.3–14.5)
SODIUM SERPL-SCNC: 140 MMOL/L — SIGNIFICANT CHANGE UP (ref 135–145)
TSH SERPL-MCNC: 3 UIU/ML — SIGNIFICANT CHANGE UP (ref 0.27–4.2)
WBC # BLD: 3.86 K/UL — SIGNIFICANT CHANGE UP (ref 3.8–10.5)
WBC # FLD AUTO: 3.86 K/UL — SIGNIFICANT CHANGE UP (ref 3.8–10.5)

## 2024-06-05 PROCEDURE — G2211 COMPLEX E/M VISIT ADD ON: CPT

## 2024-06-05 PROCEDURE — 99213 OFFICE O/P EST LOW 20 MIN: CPT

## 2024-06-05 PROCEDURE — 99214 OFFICE O/P EST MOD 30 MIN: CPT

## 2024-06-05 RX ORDER — ANASTROZOLE TABLETS 1 MG/1
1 TABLET ORAL DAILY
Qty: 90 | Refills: 1 | Status: ACTIVE | COMMUNITY
Start: 2023-01-27 | End: 1900-01-01

## 2024-06-05 NOTE — HISTORY OF PRESENT ILLNESS
[Disease: _____________________] : Disease: [unfilled] [T: ___] : T[unfilled] [N: ___] : N[unfilled] [M: ___] : M[unfilled] [AJCC Stage: ____] : AJCC Stage: [unfilled] [de-identified] : Cervical cancer diagnosed at age 52 in 2022 while in [facility] Right breast cancer diagnosed simultaneously at age 52 7/2022 Pap smear revealed HSIL and +HPV.  Pathology noted SCCA, moderately differentiated 7/22/22 CT scan in [facility] identified cervical mass, 5.6 x 4.2 cm, invading the uterine isthmus and body. The parametria are infiltrated. Thickened uterine lining. Lymphadenopathy with right common iliac lymph node: 1.1 x 1.5 cm, right pelvic wall node: 1.8 x 3.1 cm, left pelvic wall node: 2.4 x 3.0 cm, right external iliac lymph node: 1.8 x 1.6 cm  CT scan also reported a 1.6 x 1.7 cm tuberous mass at the borderline of the lower quadrant of the right breast, with no infiltration of the thoracic muscles.  Right axillary lymphadenopathy measuring 1.4 x 1.0 cm, 1.0 x 1.0 cm and 0.8 x 1.1 cm.  8/10/22 Cervical biopsy revealed high grade squamous intraepithelial lesion (HGSIL) (CIN3) with PD-L1 Tumor Proportion Score (TPS) 30%, positive 8/18/22 PET/CT scan showed a large area of intense activity within the uterine cavity extending into the uterine cervix with SUV 19.5. FDG avid retroperitoneal and bilateral pelvic lymphadenopathy (SUV 7.4 - 21.1). FDG avid right breast nodule (SUV 13.9) and right retropectoral (SUV 6.1), axillary (SUV 18.2) and internal mammary lymph nodes (SUV 5.8) suspicious for metastasis from breast. Mildly FDG avid prevascular and left retropectoral LN are indeterminate. Nonspecific FDG avidity in bilateral palatine tonsils, probably inflammatory. Nonspecific generalized BM hypermetabolism without CT correlate, probably reactive and may be related to anemia. 8/19/22 Mammogram and sonogram revealed heterogeneously dense breast parenchyma and an irregularly marginated mass in the upper outer quadrant  of the right breast with adjacent clusters of indeterminate calcifications. Sonogram revealed a 1.0 x 1.2 cm mass at 10:00. BI-RADS 5 8/23/22 Right breast biopsy revealed moderately to poorly differentiated IDC with extensive lymphovascular invasion, ER 95%, AZ 95%, HER-2 2+ FISH negative 8/26/22 Right axillary LN revealed metastatic adenocarcinoma c/w breast origin.  8/26/22 Left supraclavicular LN positive for metastatic squamous cell carcinoma consistent with Stage IV cervical cancer 9/1/22 - 1/4/23 Chemotherapy for cervical cancer with 6 cycles of Cisplatin/Taxol/Bevacizumab/Pembrolizumab. Bevacizumab d/c after 3 cycles due to mediport dehiscence 1/11/23 PET/CT scan showed interval response to therapy with residual low level FDG avidity in lymph nodes in the pelvis similar to blood pool and no new lesions. Interval resolution of FDG-avid right breast lesion and retropectoral, right axillary, mediastinal, and right internal mammary lymph nodes. Low-level symmetrical FDG avidity in the christopher, nonspecific.  No FDG avid lymph nodes in the axilla above background. Interval decrease in size and FDG avidity of pelvic lymphadenopathy. Reference nodes: -Interval resolution of right internal iliac node; previously measuring approximately 1.1 cm, SUV 14. -Right external iliac mass, SUV 3.7 measuring 1.4 x 0.7 cm, image 220; previously 3 x 1.9 cm, SUV 21. -Right internal iliac node, no discernible FDG avidity, 0.8 x 0.6 cm, image 217; previously SUV 7.4, 1.6 x 1.5 cm. -Left pelvic sidewall mass, SUV 3.3, 1.7 x 0.6 cm, image 224; previously SUV 20, 3.4 x 2.2 cm (SUV 20.2; image 231). 9/14/22 - 1/4/23 Cisplatin/Taxol/Bevacizumab/Pembrolizumab for cervical cancer. Bevacizumab d/c after 3 cycles due to mediport dehiscence.  1/11/23 PET scan showed a good response to treatment. 2/1/23 Maintenance pembrolizumab started and to continue every 6 weeks. 2/23 Lupron started and to continue every 3 months with anastrozole 1 mg daily.   [de-identified] : Right breast IDC with extensive lymphovascular invasion, axillary LN positive, ER 95%, NC 95%, HER-2 2+ FISH negative [de-identified] : Shari follows up for her breast cancer and is seen today accompanied by her daughter Tammy, who also served as an . She completed C6 of chemotherapy for cervical cancer on 1/4/23 with Cisplatin/Taxol/Bevacizumab/Pembrolizumab. Myesha d/c after 3 cycles due to mediport dehiscence.  PET scan showed a good response to treatment and she started maintenance pembrolizumab every 3 weeks, later decreased to every 6 weeks. PET scan from 12/13/23 showed intense activity in the upper cervix extending to the lower uterus with hypermetabolic right external iliac nodes suspicious for recurrence. This was reviewed by Dr. Charles and she was referred to RT for further evaluation and management. She started Lupron and anastrozole in 2/23 and is tolerating them very well with mild arthralgias with intense stiffness in the morning better with activity.  She denies any hot flashes and rare hot flashes. She c/o intense "buttock pain " after each and every Eligard injection, often times lasting for week or longer after the injection. Before starting the Lupron her periods were very regular, coming monthly. Although in 2020 she was getting her period for over a trigger, but triggered after the COVID vaccine as per daughter. 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 She is on maintenance pembrolizumab every 6 weeks and had an MRI pelvis on 3/12/24 and is due for CT scan sometime this month.  HEALTH MAINTENANCE: PCP: Dr. Heather Kumari Mammogram and breast ultrasound: 8/14/23 BI-RADS 4A Known biopsy-proven cancer in the right 10:00 location in association with clip and numerous calcifications spanning at least 5 cm on the mammogram for which appropriate surgical and oncologic management is recommended. A6 mm nodule in the right 2:00 location for which ultrasound guided core biopsy is again recommended. Findings and recommendations were conveyed to the patient her daughter as well as Dr. Acosta on 8/14/2023. The ultrasound-guided core biopsy is not being performed at the current time as per Dr. Acosta given patient's clinical extent of disease Bone density: 9/13/23 showed T scores of -1.2 in spine, -1.8 in femoral neck and -1.2 in total hip Pap Smear: 7/22 Colonoscopy: none

## 2024-06-05 NOTE — REASON FOR VISIT
[Follow-Up Visit] : a follow-up [Pre-Treatment Visit] : a pre-treatment [FreeTextEntry2] : metastatic cervical cancer, breast cancer.

## 2024-06-05 NOTE — HISTORY OF PRESENT ILLNESS
[Disease: _____________________] : Disease: [unfilled] [AJCC Stage: ____] : AJCC Stage: [unfilled] [Treatment Protocol] : Treatment Protocol [de-identified] : 52 y.o female with newly diagnosed with cervical cancer in [facility].\par  \par  \par  \par  Ms. Sharma, 52 years old, accompanied by her daughter, Tammy and is referred by Dr. Charles, for HSIL (7/20/22 from [facility]) cervical mass, thickened endometrium noted on CT (from [facility]). 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 \par  \par  Pt is without symptoms; she has not seen a GYN in over 10 years and recalls never having an abnormal pap smear. She returned home to the [facility] to help her mother with medical problems and decided to have a GYN checkup at that time. Pap smear returned HSIL / +HPV. Pathology noted SCC moderately differentiated and CT scan identified cervical mass; thickened lining of the uterus; lymphadenopathy as well as a right breast mass and right axillary lymphadenopathy. \par  \par  She denies f/c/n/v/d/abnormal vaginal bleeding, changes to bowel or bladder habits. Denies unintentional weight loss or gain. Denies post-coital or intercycle vaginal bleeding; denies abdominal pain, distention, bloating, back pain or any other associated symptoms. \par  \par  Imaging:\par  7/22/20 ([facility], [facility])\par  Breast: 1.6 x 1.7 cm tuberous, polycyclic contour, accumulating contrast medium, visualized at the borderline of the lower quadrants of the right breast, with no infiltration of the thoracic muscles. \par  Right axillary region: 1.4 x 1.0 cm; 1.0 x 1.0 cm and 0.8 x 1.1 cm. lymphadenopathy. \par  Liver: well defined even borders, the liver parenchyma is with signs of fat rearrangement, a hypdense inclusion 1.6 x 2.1 cm in size, is subcapsularly visualized Segment 8 of the liver. It lacunarly accumulates contrast medium, additionally accumulates contrast medium during the delayed phase. The intrahepatic and extrahepatic bile ducts are not dilated. \par  Abdominal lymph nodes: paraaortic and interaortocaval lymph nodes are sized 10-12 mm, weakly accumulate contrast medium. \par  No free fluid in the abdominal cavity. \par  Uterus: 5.9 x 7.5 cm. \par  EM: 15 mm with dense fluid visualized, mildly heterogeneous. \par  Cervix: tissue lesion 56 x 42 mm invading the uterine isthmus and body. The parametria are infiltrated. \par  Uterine appendages are normally positioned, a cystic inclusion of 3.3 x 3.5 cm is visualized in the projection of the left ovary. \par  Right Ovary: 2.0 x 2.8 cm. \par  Lymph Nodes:\par  - right common iliac lymph node: 1.1 x 1.5 cm.\par  - right pelvic wall node: 1.8 x 3.1 cm\par  - left pelvic wall node: 2.4 x 3.0 cm. \par  - right external iliac lymph node: 1.8 x 1.6 cm \par  \par  She was seen by Dr. Holm and had a biospy.\par  Pathology: Squamous cell cancer.\par  Patient had right breast biopsy yesterday, pending results.\par  \par  SH: No smoking or drinking. 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 \par  Menarche: 12\par  Menopause: still menstruating. LMP 7/30/22\par  \par  She completed C6 of chemotherapy for cervical cancer on 1/4/23 with Cisplatin/Taxol/Bevacizumab/Pembrolizumab. Myesha d/c after 3 cycles due to mediport dehiscence. PET scan showed a good response to treatment and she is now going to start maintenance pembrolizumab.\par  \par  Pet scan shows very good response in breast and lymph nodes from chemotherapy     (Cis/Taxol/Keytruda). Patient sees Dr. Perrin and Dr. Acosta for stage IIIC breast cancer.\par      Given Stage IV cervical cancer there is no indication for doing breast surgery at this time, as per the breast team. Patient started on Lupron and anastrozole. \par  \par  \par  \par  \par   [FreeTextEntry1] : Jean-Pierre [de-identified] : Patient is here for follow up and treatment, feeling well, has no new complaints. Has some continued joint stiffness/pain in mornings but it is tolerable and does not require medication. 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 Appetite is good. Denies chest pain, SOB, cough, abdominal pain, nausea, vomiting, bowel/bladder issues, bleeding, rash.

## 2024-06-10 ENCOUNTER — RESULT REVIEW (OUTPATIENT)
Age: 54
End: 2024-06-10

## 2024-06-18 ENCOUNTER — OUTPATIENT (OUTPATIENT)
Dept: OUTPATIENT SERVICES | Facility: HOSPITAL | Age: 54
LOS: 1 days | End: 2024-06-18
Payer: COMMERCIAL

## 2024-06-18 ENCOUNTER — APPOINTMENT (OUTPATIENT)
Dept: CT IMAGING | Facility: CLINIC | Age: 54
End: 2024-06-18
Payer: COMMERCIAL

## 2024-06-18 DIAGNOSIS — C53.9 MALIGNANT NEOPLASM OF CERVIX UTERI, UNSPECIFIED: ICD-10-CM

## 2024-06-18 PROCEDURE — 74177 CT ABD & PELVIS W/CONTRAST: CPT | Mod: 26

## 2024-06-18 PROCEDURE — 71260 CT THORAX DX C+: CPT

## 2024-06-18 PROCEDURE — 74177 CT ABD & PELVIS W/CONTRAST: CPT

## 2024-06-18 PROCEDURE — 71260 CT THORAX DX C+: CPT | Mod: 26

## 2024-06-20 ENCOUNTER — APPOINTMENT (OUTPATIENT)
Dept: RADIATION ONCOLOGY | Facility: CLINIC | Age: 54
End: 2024-06-20
Payer: COMMERCIAL

## 2024-06-20 VITALS
HEART RATE: 82 BPM | DIASTOLIC BLOOD PRESSURE: 80 MMHG | SYSTOLIC BLOOD PRESSURE: 121 MMHG | OXYGEN SATURATION: 97 % | RESPIRATION RATE: 16 BRPM

## 2024-06-20 DIAGNOSIS — C50.911 MALIGNANT NEOPLASM OF UNSPECIFIED SITE OF RIGHT FEMALE BREAST: ICD-10-CM

## 2024-06-20 DIAGNOSIS — Z85.41 ENCOUNTER FOR FOLLOW-UP EXAMINATION AFTER COMPLETED TREATMENT FOR MALIGNANT NEOPLASM: ICD-10-CM

## 2024-06-20 DIAGNOSIS — Z92.3 PERSONAL HISTORY OF IRRADIATION: ICD-10-CM

## 2024-06-20 DIAGNOSIS — Z08 ENCOUNTER FOR FOLLOW-UP EXAMINATION AFTER COMPLETED TREATMENT FOR MALIGNANT NEOPLASM: ICD-10-CM

## 2024-06-20 PROCEDURE — 99212 OFFICE O/P EST SF 10 MIN: CPT

## 2024-06-20 NOTE — REVIEW OF SYSTEMS
[Constipation: Grade 0] : Constipation: Grade 0 [Diarrhea: Grade 0] : Diarrhea: Grade 0 [Rectal Pain: Grade 0] : Rectal Pain: Grade 0 [Fatigue: Grade 0] : Fatigue: Grade 0 [Hematuria: Grade 0] : Hematuria: Grade 0 [Urinary Incontinence: Grade 0] : Urinary Incontinence: Grade 0  [Urinary Retention: Grade 0] : Urinary Retention: Grade 0 [Urinary Tract Pain: Grade 0] : Urinary Tract Pain: Grade 0 [Urinary Urgency: Grade 0] : Urinary Urgency: Grade 0 [Urinary Frequency: Grade 0] : Urinary Frequency: Grade 0 [Genital Edema: Grade 0] : Genital Edema: Grade 0 [Vaginal Stricture: Grade 0] : Vaginal Stricture: Grade 0 [Vaginal Infection: Grade 0] : Vaginal Infection: Grade 0  [Nausea: Grade 0] : Nausea: Grade 0 [Breast Pain: Grade 0] : Breast Pain: Grade 0 [Pruritus: Grade 0] : Pruritus: Grade 0

## 2024-06-25 PROBLEM — Z08 ENCOUNTER FOR FOLLOW-UP SURVEILLANCE OF CERVICAL CANCER: Status: ACTIVE | Noted: 2022-11-30

## 2024-06-25 PROBLEM — C50.911 BREAST CANCER, RIGHT: Status: ACTIVE | Noted: 2022-08-25

## 2024-06-25 PROBLEM — Z92.3 H/O THERAPEUTIC RADIATION: Status: ACTIVE | Noted: 2024-06-25

## 2024-06-25 NOTE — HISTORY OF PRESENT ILLNESS
[FreeTextEntry1] : MARISOL OROSCO is a 54-year-old F w/ metastatic cervical cancer & synchronous right breast IDC (ER+/NV+/HER2-) with left supraclavicular LN biopsy positive for metastatic SCC c/w stage IV cervical cancer, started on cisplatin/Taxol/Keytruda for cervical ca, with response, resolution of LNs avidity. However, recent PET scan was concerning for one external iliac lymph node as well as recurrence in the primary site. Prior PET with no residual hypermetabolism in the primary. She is on Pembro and hormonal tx (anastrozole and Lupron) for breast CA.  She is accompanied by her daughter who assists with translation as needed.    3/11/2024 Completed RT to cervix/pelvis total dose of [id number] in 28 fractions.   3/12/2024 MRI Abdomen and Pelvis: New upper retroperitoneal adenopathy to include para-aortic, aortocaval and paracaval adenopathy. Right external iliac adenopathy decreased in size and now partially necrotic likely reflecting treatment change. A subcentimeter focus in the right aspect of the cervix may represent residual cervical tumor. Overall tumor is decreased when compared with prior PET CT.  6/18/24 CT C/A/P:  New left retropectoral and supraclavicular adenopathy. Mild interval enlargement of left para-aortic adenopathy. Additional retroperitoneal and pelvic adenopathy stable or decreased.  6/20/24: She presents today for follow-up and review of recent CT results. She is accompanied by her daughter; they decline the  phone and prefer her daughter to translate. She remains on Pembro, Eligard, and anastrozole, tolerating well. She is overall feeling very well, with good energy and good appetite. No abdominal or pelvic pain, no vaginal bleeding, no GI/ issues. Seeing Dr. Holm every 3 months, and following with Allen Charles, Aydin, and Karla.  6/5  = 21.

## 2024-06-25 NOTE — REASON FOR VISIT
[Routine Follow-Up] : routine follow-up visit for [Other: ___] : [unfilled] [Other: _____] : [unfilled] [Patient Declined  Services] : - None: Patient declined  services [Interpreters_FullName] : Tammy [Interpreters_Relationshiptopatient] : Daughter [FreeTextEntry3] : Sara [TWNoteComboBox1] : Other

## 2024-06-25 NOTE — PHYSICAL EXAM
[] : no respiratory distress [Abdomen Soft] : soft [Normal Vagina] : normal vagina without lesions or masses [Normal] : well developed, well nourished, in no acute distress [Respiration, Rhythm And Depth] : normal respiratory rhythm and effort [Abdomen Tenderness] : non-tender [No Focal Deficits] : no focal deficits [Oriented To Time, Place, And Person] : oriented to person, place, and time [Affect] : the affect was normal [Normal External Genitalia] : normal external genitalia  [Normal Cervix] : normal cervix without masses

## 2024-06-26 ENCOUNTER — NON-APPOINTMENT (OUTPATIENT)
Age: 54
End: 2024-06-26

## 2024-06-26 ENCOUNTER — OUTPATIENT (OUTPATIENT)
Dept: OUTPATIENT SERVICES | Facility: HOSPITAL | Age: 54
LOS: 1 days | Discharge: HOME | End: 2024-06-26

## 2024-06-26 DIAGNOSIS — C53.9 MALIGNANT NEOPLASM OF CERVIX UTERI, UNSPECIFIED: ICD-10-CM

## 2024-06-27 ENCOUNTER — APPOINTMENT (OUTPATIENT)
Dept: HEMATOLOGY ONCOLOGY | Facility: CLINIC | Age: 54
End: 2024-06-27
Payer: COMMERCIAL

## 2024-06-27 VITALS
HEART RATE: 72 BPM | TEMPERATURE: 97.3 F | DIASTOLIC BLOOD PRESSURE: 77 MMHG | SYSTOLIC BLOOD PRESSURE: 120 MMHG | RESPIRATION RATE: 16 BRPM | OXYGEN SATURATION: 99 % | WEIGHT: 154.32 LBS | BODY MASS INDEX: 29.16 KG/M2

## 2024-06-27 DIAGNOSIS — C53.9 MALIGNANT NEOPLASM OF CERVIX UTERI, UNSPECIFIED: ICD-10-CM

## 2024-06-27 PROCEDURE — 99214 OFFICE O/P EST MOD 30 MIN: CPT

## 2024-06-30 PROBLEM — C53.9 PRIMARY CERVICAL CANCER: Status: ACTIVE | Noted: 2022-08-10

## 2024-06-30 PROBLEM — C53.9 CERVICAL CANCER, FIGO STAGE IVB: Status: ACTIVE | Noted: 2024-01-11

## 2024-07-11 RX ORDER — DEXAMETHASONE 4 MG/1
4 TABLET ORAL
Qty: 30 | Refills: 1 | Status: ACTIVE | COMMUNITY
Start: 2024-07-11 | End: 1900-01-01

## 2024-07-11 RX ORDER — PROCHLORPERAZINE MALEATE 10 MG/1
10 TABLET ORAL EVERY 6 HOURS
Qty: 20 | Refills: 2 | Status: ACTIVE | COMMUNITY
Start: 2024-07-11 | End: 1900-01-01

## 2024-07-17 ENCOUNTER — APPOINTMENT (OUTPATIENT)
Dept: HEMATOLOGY ONCOLOGY | Facility: CLINIC | Age: 54
End: 2024-07-17

## 2024-07-17 ENCOUNTER — RESULT REVIEW (OUTPATIENT)
Age: 54
End: 2024-07-17

## 2024-07-17 ENCOUNTER — NON-APPOINTMENT (OUTPATIENT)
Age: 54
End: 2024-07-17

## 2024-07-17 ENCOUNTER — APPOINTMENT (OUTPATIENT)
Dept: INFUSION THERAPY | Facility: HOSPITAL | Age: 54
End: 2024-07-17

## 2024-07-17 LAB
BASOPHILS # BLD AUTO: 0.01 K/UL — SIGNIFICANT CHANGE UP (ref 0–0.2)
BASOPHILS NFR BLD AUTO: 0.2 % — SIGNIFICANT CHANGE UP (ref 0–2)
EOSINOPHIL # BLD AUTO: 0 K/UL — SIGNIFICANT CHANGE UP (ref 0–0.5)
EOSINOPHIL NFR BLD AUTO: 0 % — SIGNIFICANT CHANGE UP (ref 0–6)
HCT VFR BLD CALC: 36.1 % — SIGNIFICANT CHANGE UP (ref 34.5–45)
HGB BLD-MCNC: 12.3 G/DL — SIGNIFICANT CHANGE UP (ref 11.5–15.5)
IMM GRANULOCYTES NFR BLD AUTO: 0.7 % — SIGNIFICANT CHANGE UP (ref 0–0.9)
LYMPHOCYTES # BLD AUTO: 0.48 K/UL — LOW (ref 1–3.3)
LYMPHOCYTES # BLD AUTO: 11.9 % — LOW (ref 13–44)
MCHC RBC-ENTMCNC: 26.9 PG — LOW (ref 27–34)
MCHC RBC-ENTMCNC: 34.1 G/DL — SIGNIFICANT CHANGE UP (ref 32–36)
MCV RBC AUTO: 78.8 FL — LOW (ref 80–100)
MONOCYTES # BLD AUTO: 0.05 K/UL — SIGNIFICANT CHANGE UP (ref 0–0.9)
MONOCYTES NFR BLD AUTO: 1.2 % — LOW (ref 2–14)
NEUTROPHILS # BLD AUTO: 3.47 K/UL — SIGNIFICANT CHANGE UP (ref 1.8–7.4)
NEUTROPHILS NFR BLD AUTO: 86 % — HIGH (ref 43–77)
NRBC # BLD: 0 /100 WBCS — SIGNIFICANT CHANGE UP (ref 0–0)
PLATELET # BLD AUTO: 265 K/UL — SIGNIFICANT CHANGE UP (ref 150–400)
RBC # BLD: 4.58 M/UL — SIGNIFICANT CHANGE UP (ref 3.8–5.2)
RBC # FLD: 14.5 % — SIGNIFICANT CHANGE UP (ref 10.3–14.5)
WBC # BLD: 4.04 K/UL — SIGNIFICANT CHANGE UP (ref 3.8–10.5)
WBC # FLD AUTO: 4.04 K/UL — SIGNIFICANT CHANGE UP (ref 3.8–10.5)

## 2024-07-18 DIAGNOSIS — R11.2 NAUSEA WITH VOMITING, UNSPECIFIED: ICD-10-CM

## 2024-07-18 DIAGNOSIS — Z51.11 ENCOUNTER FOR ANTINEOPLASTIC CHEMOTHERAPY: ICD-10-CM

## 2024-07-23 ENCOUNTER — APPOINTMENT (OUTPATIENT)
Dept: INFUSION THERAPY | Facility: HOSPITAL | Age: 54
End: 2024-07-23

## 2024-07-29 ENCOUNTER — APPOINTMENT (OUTPATIENT)
Dept: HEMATOLOGY ONCOLOGY | Facility: CLINIC | Age: 54
End: 2024-07-29
Payer: COMMERCIAL

## 2024-07-29 ENCOUNTER — APPOINTMENT (OUTPATIENT)
Dept: SURGICAL ONCOLOGY | Facility: CLINIC | Age: 54
End: 2024-07-29

## 2024-07-29 ENCOUNTER — RESULT REVIEW (OUTPATIENT)
Age: 54
End: 2024-07-29

## 2024-07-29 VITALS
HEART RATE: 67 BPM | DIASTOLIC BLOOD PRESSURE: 78 MMHG | TEMPERATURE: 97.2 F | OXYGEN SATURATION: 99 % | RESPIRATION RATE: 16 BRPM | BODY MASS INDEX: 28.53 KG/M2 | WEIGHT: 150.99 LBS | SYSTOLIC BLOOD PRESSURE: 134 MMHG

## 2024-07-29 DIAGNOSIS — C53.9 MALIGNANT NEOPLASM OF CERVIX UTERI, UNSPECIFIED: ICD-10-CM

## 2024-07-29 LAB
BASOPHILS # BLD AUTO: 0.01 K/UL — SIGNIFICANT CHANGE UP (ref 0–0.2)
BASOPHILS NFR BLD AUTO: 0.3 % — SIGNIFICANT CHANGE UP (ref 0–2)
EOSINOPHIL # BLD AUTO: 0.06 K/UL — SIGNIFICANT CHANGE UP (ref 0–0.5)
EOSINOPHIL NFR BLD AUTO: 1.9 % — SIGNIFICANT CHANGE UP (ref 0–6)
HCT VFR BLD CALC: 32.8 % — LOW (ref 34.5–45)
HGB BLD-MCNC: 10.9 G/DL — LOW (ref 11.5–15.5)
IMM GRANULOCYTES NFR BLD AUTO: 0.3 % — SIGNIFICANT CHANGE UP (ref 0–0.9)
LYMPHOCYTES # BLD AUTO: 0.6 K/UL — LOW (ref 1–3.3)
LYMPHOCYTES # BLD AUTO: 18.8 % — SIGNIFICANT CHANGE UP (ref 13–44)
MCHC RBC-ENTMCNC: 26.9 PG — LOW (ref 27–34)
MCHC RBC-ENTMCNC: 33.2 G/DL — SIGNIFICANT CHANGE UP (ref 32–36)
MCV RBC AUTO: 81 FL — SIGNIFICANT CHANGE UP (ref 80–100)
MONOCYTES # BLD AUTO: 0.36 K/UL — SIGNIFICANT CHANGE UP (ref 0–0.9)
MONOCYTES NFR BLD AUTO: 11.3 % — SIGNIFICANT CHANGE UP (ref 2–14)
NEUTROPHILS # BLD AUTO: 2.16 K/UL — SIGNIFICANT CHANGE UP (ref 1.8–7.4)
NEUTROPHILS NFR BLD AUTO: 67.4 % — SIGNIFICANT CHANGE UP (ref 43–77)
NRBC # BLD: 0 /100 WBCS — SIGNIFICANT CHANGE UP (ref 0–0)
PLATELET # BLD AUTO: 152 K/UL — SIGNIFICANT CHANGE UP (ref 150–400)
RBC # BLD: 4.05 M/UL — SIGNIFICANT CHANGE UP (ref 3.8–5.2)
RBC # FLD: 14.6 % — HIGH (ref 10.3–14.5)
WBC # BLD: 3.2 K/UL — LOW (ref 3.8–10.5)
WBC # FLD AUTO: 3.2 K/UL — LOW (ref 3.8–10.5)

## 2024-07-29 PROCEDURE — 99213 OFFICE O/P EST LOW 20 MIN: CPT

## 2024-07-29 NOTE — ASSESSMENT
[FreeTextEntry1] : IMP: 53yo F w/ metastatic cervical cancer & synchronous right breast IDC (ER+/AZ+/HER2-) 8/26/22- right axillary LN metastatic adenocarcinoma c/w breast origin 8/26/22- left supraclavicular LN positive for metastatic SCC c/w stage IV cervical cancer cisplatin/Taxol/Keytruda for cervical ca (completed 6 cycles as of 1/4/2023) Avastin D/Alfredo r/t port dehiscence  PET/CT 1/11/23- Compared with PET scan 8/8/2022, there has been interval response to therapy. Residual low-level FDG avidity in lymph nodes in the pelvis is similar to blood pool. No new lesions.  Started maintenance Keytruda 2/2023 (w/ Dr. Charles) along with Lupron and Anastrozole w/ Dr. Perrin  B/L mammo/sono 8/2023 -  - Known biopsy-proven cancer in the right 10:00 location in association with clip and numerous calcifications spanning at least 5 cm on the mammogram for which appropriate surgical and oncologic management is recommended. - 6 mm nodule in the right 2:00 location for which ultrasound guided core biopsy is again recommended. BIRADS 4A **no biopsy done given extent of disease  PLAN: No role for breast surgery at this time RTO 3 months continue Keytruda and Lupron & Arimidex

## 2024-07-29 NOTE — HISTORY OF PRESENT ILLNESS
[Disease: _____________________] : Disease: [unfilled] [AJCC Stage: ____] : AJCC Stage: [unfilled] [de-identified] : 52 y.o female with newly diagnosed with cervical cancer in [facility].    Ms. Sharma, 52 years old, accompanied by her daughter, Tammy and is referred by Dr. Charles, for HSIL (22 from [facility]) cervical mass, thickened endometrium noted on CT (from [facility]). 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   Pt is without symptoms; she has not seen a GYN in over 10 years and recalls never having an abnormal pap smear. She returned home to the [facility] to help her mother with medical problems and decided to have a GYN checkup at that time. Pap smear returned HSIL / +HPV. Pathology noted SCC moderately differentiated and CT scan identified cervical mass; thickened lining of the uterus; lymphadenopathy as well as a right breast mass and right axillary lymphadenopathy.   She denies f/c/n/v/d/abnormal vaginal bleeding, changes to bowel or bladder habits. Denies unintentional weight loss or gain. Denies post-coital or intercycle vaginal bleeding; denies abdominal pain, distention, bloating, back pain or any other associated symptoms.   Imagin20 ([facility], [facility]) Breast: 1.6 x 1.7 cm tuberous, polycyclic contour, accumulating contrast medium, visualized at the borderline of the lower quadrants of the right breast, with no infiltration of the thoracic muscles.  Right axillary region: 1.4 x 1.0 cm; 1.0 x 1.0 cm and 0.8 x 1.1 cm. lymphadenopathy.  Liver: well defined even borders, the liver parenchyma is with signs of fat rearrangement, a hypdense inclusion 1.6 x 2.1 cm in size, is subcapsularly visualized Segment 8 of the liver. It lacunarly accumulates contrast medium, additionally accumulates contrast medium during the delayed phase. The intrahepatic and extrahepatic bile ducts are not dilated.  Abdominal lymph nodes: paraaortic and interaortocaval lymph nodes are sized 10-12 mm, weakly accumulate contrast medium.  No free fluid in the abdominal cavity.  Uterus: 5.9 x 7.5 cm.  EM: 15 mm with dense fluid visualized, mildly heterogeneous.  Cervix: tissue lesion 56 x 42 mm invading the uterine isthmus and body. The parametria are infiltrated.  Uterine appendages are normally positioned, a cystic inclusion of 3.3 x 3.5 cm is visualized in the projection of the left ovary.  Right Ovary: 2.0 x 2.8 cm.  Lymph Nodes: - right common iliac lymph node: 1.1 x 1.5 cm. - right pelvic wall node: 1.8 x 3.1 cm - left pelvic wall node: 2.4 x 3.0 cm.  - right external iliac lymph node: 1.8 x 1.6 cm   She was seen by Dr. Holm and had a biospy. Pathology: Squamous cell cancer. Patient had right breast biopsy yesterday, pending results.  SH: No smoking or drinking. 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  Menarche: 12 Menopause: still menstruating. LMP 22  She completed C6 of chemotherapy for cervical cancer on 23 with Cisplatin/Taxol/Bevacizumab/Pembrolizumab. Myesha d/c after 3 cycles due to mediport dehiscence. PET scan showed a good response to treatment and she is now going to start maintenance pembrolizumab.  Pet scan shows very good response in breast and lymph nodes from chemotherapy     (Cis/Taxol/Keytruda). Patient sees Dr. Perrin and Dr. Acosta for stage IIIC breast cancer.     Given Stage IV cervical cancer there is no indication for doing breast surgery at this time, as per the breast team. Patient started on Lupron and anastrozole.   She was on Keytruda maintenance from 23 through 24 (14 cycles) then was found to have POD. Of note she completed RT to cervix/pelvis total dose of [id number] in 28 fractions for increased adenopathy with Dr. Louis. Treatment was changed to Carboplatin retreat.      [FreeTextEntry1] : [place] Carboplatin C1 - 7/17/24 [de-identified] : Patient is here for follow up after starting new treatment with Carboplatin. She tolerated treatment well. Her only notes were low appetite on day 5 and dyguesia day 6 for a few hours but both resolved quickly. She denies chest pain, SOB, abdominal pain, nausea, vomiting, diarrhea, constipation, neuropathy.

## 2024-07-29 NOTE — HISTORY OF PRESENT ILLNESS
[de-identified] : Ms. MARISOL OROSCO is a 54 year old woman, primarily Egyptian speaking, who presents today for a follow up for breast cancer. Accompanied by her daughter, Tammy, who the patient prefers to translate for her.   B/L mammo 7/26/22 ([facility]) with benign findings to left breast (BIRADS 2) and right breast shows a suspicious but probably benign lesion (BIRADS 3).  She returned to [place] from the [facility] to establish care after having a biopsy in [facility] that was c/w cervical cancer. She established care with Dr. Radha Holm who repeated a cervical biopsy (path shows high grade squamous intraepithelial lesion to cervix, fragments of SCC w/ extensive tumor necrosis to endocervix) Dr. Holm also ordered PET/CT & further breast imaging   PET/CT 8/18/22 shows large area of intense activity w/in the uterine cavity corresponding to known uterine malignancy, nonspecific focal FDG activity in vaginal region, concerning for disease involvement. FDG avid retroperitoneal & B/L pelvic lymphadenopathy, c/w metastases from uterus. FDG avid right breast nodule likely malignancy. FDG avid right retropectoral, right axillary and right internal mammary nodes, suspicious for metastasis from breast malignancy. Mildly FDG avid prevascular and left retropectoral lymph nodes are indeterminate. Nonspecific approx symmetric FDG avidity in b/l palatine tonsils, probably inflammatory. Nonspecific generalized bone marrow hypermetabolism w/o CT correlate, probably reactive and may be related to anemia.  right breast diagnostic mammo/ bilateral sono to follow-up on [facility] abnormal imaging, done on 8/19/2022.  US showed suspicious mass to right breast 10:00 4 cm fn, US biopsy recommended, highly suspicious for malignancy, BI-RADS 5.  8/23/22, right breast US biopsy, 10:00 4 cm fn, path: moderately to poorly differentiated invasive ductal carcinoma with mucinous and micropapillary features, 1.4 cm, intermediate grade DCIS, cribriform pattern, extensive lymphovascular invasion present, microcalcifications present in malignant and benign epithelium. ER+/[place]+/HER2 negative by FISH  8/26/22- right axillary LN metastatic adenocarcinoma c/w breast origin  8/26/22- left supraclavicular LN positive for metastatic SCC c/w stage IV cervical cancer  CT chest/abd/pelvis 11/15/22: resolved previously seen lymphadenopathy. No evidence for residual right breast mass however dedicated right breast imaging or PET/CT would be more sensitive. Decreased overall size of the cervical mass which would be best assessed by pelvic MRI.   Family history of father with stomach cancer.   B/L mammo/sono 1/10/23 showed biopsy proven right breast (10:00) carcinoma, decreased in size & stable calcifications, indeterminate right breast mass to 2:00- U/S biopsy recommended, BI-RADS 4A  completed 6 cycles of chemo for cervical cancer 1/2023   PET/CT 1/11/23- Compared with PET scan 8/8/2022, there has been interval response to therapy. Residual low-level FDG avidity in lymph nodes in the pelvis is similar to blood pool. No new lesions. FDG-avidity in the soft tissues around the Mediport removal, query soft tissue infection.  CT chest/abd/pelvis 3/20/23: ROYA  Started maintenance Keytruda 2/2023 (w/ Dr. Charles) along with Lupron and Anastrazole w/ Dr. Perrin  DM/US on 8/14/2023 shows biopsy proven mass (10:00) with numerous calcifications spanning at least 5 cm. 6 mm nodule in the right 2:00, USGB recommended. After discussion with myself and the radiologist, USGB will not be performed at the current time given extent of disease. BI-RADS 4 A  CT C/A/P on 11/21/2023: New right external iliac adenopathy.  PET 12/18/2023:  1. Intense activity in the upper cervix extending to the lower uterus with hypermetabolic right external iliac nodes suspicious for recurrence; please correlate findings with recent cervical biopsy. 2. No evidence of locally recurrent FDG avid malignant breast lesion.  CT C/A/P 6/2024 - shows POD  started on Carboplatin   Given Stage IV cervical cancer there is no indication for doing breast surgery at this time, as per the breast team.

## 2024-07-29 NOTE — ASSESSMENT
[FreeTextEntry1] : IMP: 55yo F w/ metastatic cervical cancer & synchronous right breast IDC (ER+/ID+/HER2-) 8/26/22- right axillary LN metastatic adenocarcinoma c/w breast origin 8/26/22- left supraclavicular LN positive for metastatic SCC c/w stage IV cervical cancer cisplatin/Taxol/Keytruda for cervical ca (completed 6 cycles as of 1/4/2023) Avastin D/Alfredo r/t port dehiscence  PET/CT 1/11/23- Compared with PET scan 8/8/2022, there has been interval response to therapy. Residual low-level FDG avidity in lymph nodes in the pelvis is similar to blood pool. No new lesions.  Started maintenance Keytruda 2/2023 (w/ Dr. Charles) along with Lupron and Anastrozole w/ Dr. Perrin  B/L mammo/sono 8/2023 -  - Known biopsy-proven cancer in the right 10:00 location in association with clip and numerous calcifications spanning at least 5 cm on the mammogram for which appropriate surgical and oncologic management is recommended. - 6 mm nodule in the right 2:00 location for which ultrasound guided core biopsy is again recommended. BIRADS 4A **no biopsy done given extent of disease  PLAN: No role for breast surgery at this time RTO 3 months continue Keytruda and Lupron & Arimidex

## 2024-07-29 NOTE — HISTORY OF PRESENT ILLNESS
[de-identified] : Ms. MARISOL OROSCO is a 54 year old woman, primarily Malawian speaking, who presents today for a follow up for breast cancer. Accompanied by her daughter, Tammy, who the patient prefers to translate for her.   B/L mammo 7/26/22 ([facility]) with benign findings to left breast (BIRADS 2) and right breast shows a suspicious but probably benign lesion (BIRADS 3).  She returned to [place] from the [facility] to establish care after having a biopsy in [facility] that was c/w cervical cancer. She established care with Dr. Radha Holm who repeated a cervical biopsy (path shows high grade squamous intraepithelial lesion to cervix, fragments of SCC w/ extensive tumor necrosis to endocervix) Dr. Holm also ordered PET/CT & further breast imaging   PET/CT 8/18/22 shows large area of intense activity w/in the uterine cavity corresponding to known uterine malignancy, nonspecific focal FDG activity in vaginal region, concerning for disease involvement. FDG avid retroperitoneal & B/L pelvic lymphadenopathy, c/w metastases from uterus. FDG avid right breast nodule likely malignancy. FDG avid right retropectoral, right axillary and right internal mammary nodes, suspicious for metastasis from breast malignancy. Mildly FDG avid prevascular and left retropectoral lymph nodes are indeterminate. Nonspecific approx symmetric FDG avidity in b/l palatine tonsils, probably inflammatory. Nonspecific generalized bone marrow hypermetabolism w/o CT correlate, probably reactive and may be related to anemia.  right breast diagnostic mammo/ bilateral sono to follow-up on [facility] abnormal imaging, done on 8/19/2022.  US showed suspicious mass to right breast 10:00 4 cm fn, US biopsy recommended, highly suspicious for malignancy, BI-RADS 5.  8/23/22, right breast US biopsy, 10:00 4 cm fn, path: moderately to poorly differentiated invasive ductal carcinoma with mucinous and micropapillary features, 1.4 cm, intermediate grade DCIS, cribriform pattern, extensive lymphovascular invasion present, microcalcifications present in malignant and benign epithelium. ER+/[place]+/HER2 negative by FISH  8/26/22- right axillary LN metastatic adenocarcinoma c/w breast origin  8/26/22- left supraclavicular LN positive for metastatic SCC c/w stage IV cervical cancer  CT chest/abd/pelvis 11/15/22: resolved previously seen lymphadenopathy. No evidence for residual right breast mass however dedicated right breast imaging or PET/CT would be more sensitive. Decreased overall size of the cervical mass which would be best assessed by pelvic MRI.   Family history of father with stomach cancer.   B/L mammo/sono 1/10/23 showed biopsy proven right breast (10:00) carcinoma, decreased in size & stable calcifications, indeterminate right breast mass to 2:00- U/S biopsy recommended, BI-RADS 4A  completed 6 cycles of chemo for cervical cancer 1/2023   PET/CT 1/11/23- Compared with PET scan 8/8/2022, there has been interval response to therapy. Residual low-level FDG avidity in lymph nodes in the pelvis is similar to blood pool. No new lesions. FDG-avidity in the soft tissues around the Mediport removal, query soft tissue infection.  CT chest/abd/pelvis 3/20/23: ROYA  Started maintenance Keytruda 2/2023 (w/ Dr. Charles) along with Lupron and Anastrazole w/ Dr. Perrin  DM/US on 8/14/2023 shows biopsy proven mass (10:00) with numerous calcifications spanning at least 5 cm. 6 mm nodule in the right 2:00, USGB recommended. After discussion with myself and the radiologist, USGB will not be performed at the current time given extent of disease. BI-RADS 4 A  CT C/A/P on 11/21/2023: New right external iliac adenopathy.  PET 12/18/2023:  1. Intense activity in the upper cervix extending to the lower uterus with hypermetabolic right external iliac nodes suspicious for recurrence; please correlate findings with recent cervical biopsy. 2. No evidence of locally recurrent FDG avid malignant breast lesion.  CT C/A/P 6/2024 - shows POD  started on Carboplatin   Given Stage IV cervical cancer there is no indication for doing breast surgery at this time, as per the breast team.

## 2024-08-07 ENCOUNTER — RESULT REVIEW (OUTPATIENT)
Age: 54
End: 2024-08-07

## 2024-08-07 ENCOUNTER — APPOINTMENT (OUTPATIENT)
Dept: INFUSION THERAPY | Facility: HOSPITAL | Age: 54
End: 2024-08-07

## 2024-08-07 ENCOUNTER — APPOINTMENT (OUTPATIENT)
Dept: HEMATOLOGY ONCOLOGY | Facility: CLINIC | Age: 54
End: 2024-08-07

## 2024-08-07 LAB
BASOPHILS # BLD AUTO: 0.02 K/UL — SIGNIFICANT CHANGE UP (ref 0–0.2)
BASOPHILS NFR BLD AUTO: 0.7 % — SIGNIFICANT CHANGE UP (ref 0–2)
EOSINOPHIL # BLD AUTO: 0 K/UL — SIGNIFICANT CHANGE UP (ref 0–0.5)
EOSINOPHIL NFR BLD AUTO: 0 % — SIGNIFICANT CHANGE UP (ref 0–6)
HCT VFR BLD CALC: 33.1 % — LOW (ref 34.5–45)
HGB BLD-MCNC: 11.1 G/DL — LOW (ref 11.5–15.5)
IMM GRANULOCYTES NFR BLD AUTO: 1.4 % — HIGH (ref 0–0.9)
LYMPHOCYTES # BLD AUTO: 0.41 K/UL — LOW (ref 1–3.3)
LYMPHOCYTES # BLD AUTO: 14.9 % — SIGNIFICANT CHANGE UP (ref 13–44)
MCHC RBC-ENTMCNC: 27.1 PG — SIGNIFICANT CHANGE UP (ref 27–34)
MCHC RBC-ENTMCNC: 33.5 G/DL — SIGNIFICANT CHANGE UP (ref 32–36)
MCV RBC AUTO: 80.7 FL — SIGNIFICANT CHANGE UP (ref 80–100)
MONOCYTES # BLD AUTO: 0.07 K/UL — SIGNIFICANT CHANGE UP (ref 0–0.9)
MONOCYTES NFR BLD AUTO: 2.5 % — SIGNIFICANT CHANGE UP (ref 2–14)
NEUTROPHILS # BLD AUTO: 2.22 K/UL — SIGNIFICANT CHANGE UP (ref 1.8–7.4)
NEUTROPHILS NFR BLD AUTO: 80.5 % — HIGH (ref 43–77)
NRBC # BLD: 0 /100 WBCS — SIGNIFICANT CHANGE UP (ref 0–0)
PLATELET # BLD AUTO: 139 K/UL — LOW (ref 150–400)
RBC # BLD: 4.1 M/UL — SIGNIFICANT CHANGE UP (ref 3.8–5.2)
RBC # FLD: 16 % — HIGH (ref 10.3–14.5)
WBC # BLD: 2.76 K/UL — LOW (ref 3.8–10.5)
WBC # FLD AUTO: 2.76 K/UL — LOW (ref 3.8–10.5)

## 2024-08-21 ENCOUNTER — NON-APPOINTMENT (OUTPATIENT)
Age: 54
End: 2024-08-21

## 2024-08-21 DIAGNOSIS — N88.8 OTHER SPECIFIED NONINFLAMMATORY DISORDERS OF CERVIX UTERI: ICD-10-CM

## 2024-08-21 NOTE — ASSESSMENT
[FreeTextEntry1] : IMP: 53yo F w/ metastatic cervical cancer & synchronous right breast IDC (ER+/NM+/HER2-) 8/26/22- right axillary LN metastatic adenocarcinoma c/w breast origin 8/26/22- left supraclavicular LN positive for metastatic SCC c/w stage IV cervical cancer cisplatin/Taxol/Keytruda for cervical ca (completed 6 cycles as of 1/4/2023) Avastin D/Alfredo r/t port dehiscence  PET/CT 1/11/23- Compared with PET scan 8/8/2022, there has been interval response to therapy. Residual low-level FDG avidity in lymph nodes in the pelvis is similar to blood pool. No new lesions.  Started maintenance Keytruda 2/2023 (w/ Dr. Charles) along with Lupron and Anastrozole w/ Dr. Perrin  B/L mammo/sono 8/2023 -  - Known biopsy-proven cancer in the right 10:00 location in association with clip and numerous calcifications spanning at least 5 cm on the mammogram for which appropriate surgical and oncologic management is recommended. - 6 mm nodule in the right 2:00 location for which ultrasound guided core biopsy is again recommended. BIRADS 4A **no biopsy done given extent of disease  PLAN: No role for breast surgery at this time RTO 3 months continue Keytruda and Lupron & Arimidex

## 2024-08-21 NOTE — HISTORY OF PRESENT ILLNESS
[de-identified] : Ms. MARISOL OROSCO is a 54 year old woman, primarily Ugandan speaking, who presents today for a follow up for breast cancer. Accompanied by her daughter, Tamym, who the patient prefers to translate for her.   B/L mammo 7/26/22 ([facility] with benign findings to left breast (BIRADS 2) and right breast shows a suspicious but probably benign lesion (BIRADS 3).  She returned to [place] from the [facility] to establish care after having a biopsy in [facility] that was c/w cervical cancer. She established care with Dr. Radha Holm who repeated a cervical biopsy (path shows high grade squamous intraepithelial lesion to cervix, fragments of SCC w/ extensive tumor necrosis to endocervix) Dr. Holm also ordered PET/CT & further breast imaging   PET/CT 8/18/22 shows large area of intense activity w/in the uterine cavity corresponding to known uterine malignancy, nonspecific focal FDG activity in vaginal region, concerning for disease involvement. FDG avid retroperitoneal & B/L pelvic lymphadenopathy, c/w metastases from uterus. FDG avid right breast nodule likely malignancy. FDG avid right retropectoral, right axillary and right internal mammary nodes, suspicious for metastasis from breast malignancy. Mildly FDG avid prevascular and left retropectoral lymph nodes are indeterminate. Nonspecific approx symmetric FDG avidity in b/l palatine tonsils, probably inflammatory. Nonspecific generalized bone marrow hypermetabolism w/o CT correlate, probably reactive and may be related to anemia.  right breast diagnostic mammo/ bilateral sono to follow-up on [facility] abnormal imaging, done on 8/19/2022.  US showed suspicious mass to right breast 10:00 4 cm fn, US biopsy recommended, highly suspicious for malignancy, BI-RADS 5.  8/23/22, right breast US biopsy, 10:00 4 cm fn, path: moderately to poorly differentiated invasive ductal carcinoma with mucinous and micropapillary features, 1.4 cm, intermediate grade DCIS, cribriform pattern, extensive lymphovascular invasion present, microcalcifications present in malignant and benign epithelium. ER+/[place]+/HER2 negative by FISH  8/26/22- right axillary LN metastatic adenocarcinoma c/w breast origin  8/26/22- left supraclavicular LN positive for metastatic SCC c/w stage IV cervical cancer  CT chest/abd/pelvis 11/15/22: resolved previously seen lymphadenopathy. No evidence for residual right breast mass however dedicated right breast imaging or PET/CT would be more sensitive. Decreased overall size of the cervical mass which would be best assessed by pelvic MRI.   Family history of father with stomach cancer.   B/L mammo/sono 1/10/23 showed biopsy proven right breast (10:00) carcinoma, decreased in size & stable calcifications, indeterminate right breast mass to 2:00- U/S biopsy recommended, BI-RADS 4A  completed 6 cycles of chemo for cervical cancer 1/2023   PET/CT 1/11/23- Compared with PET scan 8/8/2022, there has been interval response to therapy. Residual low-level FDG avidity in lymph nodes in the pelvis is similar to blood pool. No new lesions. FDG-avidity in the soft tissues around the Mediport removal, query soft tissue infection.  CT chest/abd/pelvis 3/20/23: ROYA  Started maintenance Keytruda 2/2023 (w/ Dr. Charles) along with Lupron and Anastrazole w/ Dr. Perrin  DM/US on 8/14/2023 shows biopsy proven mass (10:00) with numerous calcifications spanning at least 5 cm. 6 mm nodule in the right 2:00, USGB recommended. After discussion with myself and the radiologist, USGB will not be performed at the current time given extent of disease. BI-RADS 4 A  CT C/A/P on 11/21/2023: New right external iliac adenopathy.  PET 12/18/2023:  1. Intense activity in the upper cervix extending to the lower uterus with hypermetabolic right external iliac nodes suspicious for recurrence; please correlate findings with recent cervical biopsy. 2. No evidence of locally recurrent FDG avid malignant breast lesion.  CT C/A/P 6/2024 - shows POD  started on Carboplatin   Given Stage IV cervical cancer there is no indication for doing breast surgery at this time, as per the breast team.

## 2024-08-22 NOTE — ASSESSMENT
[FreeTextEntry1] : IMP: 55yo F w/ metastatic cervical cancer & synchronous right breast IDC (ER+/NM+/HER2-) 8/26/22- right axillary LN metastatic adenocarcinoma c/w breast origin 8/26/22- left supraclavicular LN positive for metastatic SCC c/w stage IV cervical cancer cisplatin/Taxol/Keytruda for cervical ca (completed 6 cycles as of 1/4/2023) Avastin D/Alfredo r/t port dehiscence  PET/CT 1/11/23- Compared with PET scan 8/8/2022, there has been interval response to therapy. Residual low-level FDG avidity in lymph nodes in the pelvis is similar to blood pool. No new lesions.  Started maintenance Keytruda 2/2023 (w/ Dr. Charles) along with Lupron and Anastrozole w/ Dr. Perrin  B/L mammo/sono 8/2023 -  - Known biopsy-proven cancer in the right 10:00 location in association with clip and numerous calcifications spanning at least 5 cm on the mammogram for which appropriate surgical and oncologic management is recommended. - 6 mm nodule in the right 2:00 location for which ultrasound guided core biopsy is again recommended. BIRADS 4A **no biopsy done given extent of disease  PLAN: No role for breast surgery at this time RTO 3 months continue Keytruda and Lupron & Arimidex

## 2024-08-22 NOTE — HISTORY OF PRESENT ILLNESS
[de-identified] : Ms. MARISOL OROSCO is a 54 year old woman, primarily Faroese speaking, who presents today for a follow up for breast cancer. Accompanied by her daughter, Tammy, who the patient prefers to translate for her.   B/L mammo 7/26/22 ([facility]) with benign findings to left breast (BIRADS 2) and right breast shows a suspicious but probably benign lesion (BIRADS 3).  She returned to [place] from the [facility] to establish care after having a biopsy in [facility] that was c/w cervical cancer. She established care with Dr. Radha Holm who repeated a cervical biopsy (path shows high grade squamous intraepithelial lesion to cervix, fragments of SCC w/ extensive tumor necrosis to endocervix) Dr. Holm also ordered PET/CT & further breast imaging   PET/CT 8/18/22 shows large area of intense activity w/in the uterine cavity corresponding to known uterine malignancy, nonspecific focal FDG activity in vaginal region, concerning for disease involvement. FDG avid retroperitoneal & B/L pelvic lymphadenopathy, c/w metastases from uterus. FDG avid right breast nodule likely malignancy. FDG avid right retropectoral, right axillary and right internal mammary nodes, suspicious for metastasis from breast malignancy. Mildly FDG avid prevascular and left retropectoral lymph nodes are indeterminate. Nonspecific approx symmetric FDG avidity in b/l palatine tonsils, probably inflammatory. Nonspecific generalized bone marrow hypermetabolism w/o CT correlate, probably reactive and may be related to anemia.  right breast diagnostic mammo/ bilateral sono to follow-up on [facility] abnormal imaging, done on 8/19/2022.  US showed suspicious mass to right breast 10:00 4 cm fn, US biopsy recommended, highly suspicious for malignancy, BI-RADS 5.  8/23/22, right breast US biopsy, 10:00 4 cm fn, path: moderately to poorly differentiated invasive ductal carcinoma with mucinous and micropapillary features, 1.4 cm, intermediate grade DCIS, cribriform pattern, extensive lymphovascular invasion present, microcalcifications present in malignant and benign epithelium. ER+/VT+/HER2 negative by FISH  8/26/22- right axillary LN metastatic adenocarcinoma c/w breast origin  8/26/22- left supraclavicular LN positive for metastatic SCC c/w stage IV cervical cancer  CT chest/abd/pelvis 11/15/22: resolved previously seen lymphadenopathy. No evidence for residual right breast mass however dedicated right breast imaging or PET/CT would be more sensitive. Decreased overall size of the cervical mass which would be best assessed by pelvic MRI.   Family history of father with stomach cancer.   B/L mammo/sono 1/10/23 showed biopsy proven right breast (10:00) carcinoma, decreased in size & stable calcifications, indeterminate right breast mass to 2:00- U/S biopsy recommended, BI-RADS 4A  completed 6 cycles of chemo for cervical cancer 1/2023   PET/CT 1/11/23- Compared with PET scan 8/8/2022, there has been interval response to therapy. Residual low-level FDG avidity in lymph nodes in the pelvis is similar to blood pool. No new lesions. FDG-avidity in the soft tissues around the Mediport removal, query soft tissue infection.  CT chest/abd/pelvis 3/20/23: ROYA  Started maintenance Keytruda 2/2023 (w/ Dr. Charles) along with Lupron and Anastrazole w/ Dr. Perrin  DM/US on 8/14/2023 shows biopsy proven mass (10:00) with numerous calcifications spanning at least 5 cm. 6 mm nodule in the right 2:00, USGB recommended. After discussion with myself and the radiologist, USGB will not be performed at the current time given extent of disease. BI-RADS 4 A  CT C/A/P on 11/21/2023: New right external iliac adenopathy.  PET 12/18/2023:  1. Intense activity in the upper cervix extending to the lower uterus with hypermetabolic right external iliac nodes suspicious for recurrence; please correlate findings with recent cervical biopsy. 2. No evidence of locally recurrent FDG avid malignant breast lesion.  CT C/A/P 6/2024 - shows POD  started on Carboplatin   Given Stage IV cervical cancer there is no indication for doing breast surgery at this time, as per the breast team.

## 2024-08-22 NOTE — ASSESSMENT
[FreeTextEntry1] : IMP: 55yo F w/ metastatic cervical cancer & synchronous right breast IDC (ER+/MA+/HER2-) 8/26/22- right axillary LN metastatic adenocarcinoma c/w breast origin 8/26/22- left supraclavicular LN positive for metastatic SCC c/w stage IV cervical cancer cisplatin/Taxol/Keytruda for cervical ca (completed 6 cycles as of 1/4/2023) Avastin D/Alfredo r/t port dehiscence  PET/CT 1/11/23- Compared with PET scan 8/8/2022, there has been interval response to therapy. Residual low-level FDG avidity in lymph nodes in the pelvis is similar to blood pool. No new lesions.  Started maintenance Keytruda 2/2023 (w/ Dr. Charles) along with Lupron and Anastrozole w/ Dr. Perrin  B/L mammo/sono 8/2023 -  - Known biopsy-proven cancer in the right 10:00 location in association with clip and numerous calcifications spanning at least 5 cm on the mammogram for which appropriate surgical and oncologic management is recommended. - 6 mm nodule in the right 2:00 location for which ultrasound guided core biopsy is again recommended. BIRADS 4A **no biopsy done given extent of disease  PLAN: No role for breast surgery at this time RTO 3 months continue Keytruda and Lupron & Arimidex

## 2024-08-22 NOTE — HISTORY OF PRESENT ILLNESS
[de-identified] : Ms. MARISOL OROSCO is a 54 year old woman, primarily Jordanian speaking, who presents today for a follow up for breast cancer. Accompanied by her daughter, Tammy, who the patient prefers to translate for her.   B/L mammo 7/26/22 ([facility]) with benign findings to left breast (BIRADS 2) and right breast shows a suspicious but probably benign lesion (BIRADS 3).  She returned to [place] from the [facility] to establish care after having a biopsy in [facility] that was c/w cervical cancer. She established care with Dr. Radha Holm who repeated a cervical biopsy (path shows high grade squamous intraepithelial lesion to cervix, fragments of SCC w/ extensive tumor necrosis to endocervix) Dr. Holm also ordered PET/CT & further breast imaging   PET/CT 8/18/22 shows large area of intense activity w/in the uterine cavity corresponding to known uterine malignancy, nonspecific focal FDG activity in vaginal region, concerning for disease involvement. FDG avid retroperitoneal & B/L pelvic lymphadenopathy, c/w metastases from uterus. FDG avid right breast nodule likely malignancy. FDG avid right retropectoral, right axillary and right internal mammary nodes, suspicious for metastasis from breast malignancy. Mildly FDG avid prevascular and left retropectoral lymph nodes are indeterminate. Nonspecific approx symmetric FDG avidity in b/l palatine tonsils, probably inflammatory. Nonspecific generalized bone marrow hypermetabolism w/o CT correlate, probably reactive and may be related to anemia.  right breast diagnostic mammo/ bilateral sono to follow-up on [facility] abnormal imaging, done on 8/19/2022.  US showed suspicious mass to right breast 10:00 4 cm fn, US biopsy recommended, highly suspicious for malignancy, BI-RADS 5.  8/23/22, right breast US biopsy, 10:00 4 cm fn, path: moderately to poorly differentiated invasive ductal carcinoma with mucinous and micropapillary features, 1.4 cm, intermediate grade DCIS, cribriform pattern, extensive lymphovascular invasion present, microcalcifications present in malignant and benign epithelium. ER+/VT+/HER2 negative by FISH  8/26/22- right axillary LN metastatic adenocarcinoma c/w breast origin  8/26/22- left supraclavicular LN positive for metastatic SCC c/w stage IV cervical cancer  CT chest/abd/pelvis 11/15/22: resolved previously seen lymphadenopathy. No evidence for residual right breast mass however dedicated right breast imaging or PET/CT would be more sensitive. Decreased overall size of the cervical mass which would be best assessed by pelvic MRI.   Family history of father with stomach cancer.   B/L mammo/sono 1/10/23 showed biopsy proven right breast (10:00) carcinoma, decreased in size & stable calcifications, indeterminate right breast mass to 2:00- U/S biopsy recommended, BI-RADS 4A  completed 6 cycles of chemo for cervical cancer 1/2023   PET/CT 1/11/23- Compared with PET scan 8/8/2022, there has been interval response to therapy. Residual low-level FDG avidity in lymph nodes in the pelvis is similar to blood pool. No new lesions. FDG-avidity in the soft tissues around the Mediport removal, query soft tissue infection.  CT chest/abd/pelvis 3/20/23: ROYA  Started maintenance Keytruda 2/2023 (w/ Dr. Charles) along with Lupron and Anastrazole w/ Dr. Perrin  DM/US on 8/14/2023 shows biopsy proven mass (10:00) with numerous calcifications spanning at least 5 cm. 6 mm nodule in the right 2:00, USGB recommended. After discussion with myself and the radiologist, USGB will not be performed at the current time given extent of disease. BI-RADS 4 A  CT C/A/P on 11/21/2023: New right external iliac adenopathy.  PET 12/18/2023:  1. Intense activity in the upper cervix extending to the lower uterus with hypermetabolic right external iliac nodes suspicious for recurrence; please correlate findings with recent cervical biopsy. 2. No evidence of locally recurrent FDG avid malignant breast lesion.  CT C/A/P 6/2024 - shows POD  started on Carboplatin   Given Stage IV cervical cancer there is no indication for doing breast surgery at this time, as per the breast team.

## 2024-08-23 ENCOUNTER — RESULT REVIEW (OUTPATIENT)
Age: 54
End: 2024-08-23

## 2024-08-23 ENCOUNTER — APPOINTMENT (OUTPATIENT)
Dept: HEMATOLOGY ONCOLOGY | Facility: CLINIC | Age: 54
End: 2024-08-23
Payer: COMMERCIAL

## 2024-08-23 VITALS
SYSTOLIC BLOOD PRESSURE: 144 MMHG | TEMPERATURE: 97.1 F | BODY MASS INDEX: 28.74 KG/M2 | OXYGEN SATURATION: 98 % | HEART RATE: 70 BPM | RESPIRATION RATE: 16 BRPM | DIASTOLIC BLOOD PRESSURE: 80 MMHG | WEIGHT: 152.12 LBS

## 2024-08-23 DIAGNOSIS — C53.9 MALIGNANT NEOPLASM OF CERVIX UTERI, UNSPECIFIED: ICD-10-CM

## 2024-08-23 LAB
ALBUMIN SERPL ELPH-MCNC: 4.4 G/DL
ALP BLD-CCNC: 96 U/L
ALT SERPL-CCNC: 18 U/L
ANION GAP SERPL CALC-SCNC: 11 MMOL/L
AST SERPL-CCNC: 25 U/L
BASOPHILS # BLD AUTO: 0.01 K/UL — SIGNIFICANT CHANGE UP (ref 0–0.2)
BASOPHILS NFR BLD AUTO: 0.5 % — SIGNIFICANT CHANGE UP (ref 0–2)
BILIRUB SERPL-MCNC: 0.4 MG/DL
BUN SERPL-MCNC: 16 MG/DL
CALCIUM SERPL-MCNC: 10.3 MG/DL
CHLORIDE SERPL-SCNC: 106 MMOL/L
CO2 SERPL-SCNC: 24 MMOL/L
CREAT SERPL-MCNC: 0.89 MG/DL
EGFR: 77 ML/MIN/1.73M2
EOSINOPHIL # BLD AUTO: 0.06 K/UL — SIGNIFICANT CHANGE UP (ref 0–0.5)
EOSINOPHIL NFR BLD AUTO: 2.7 % — SIGNIFICANT CHANGE UP (ref 0–6)
GLUCOSE SERPL-MCNC: 89 MG/DL
HCT VFR BLD CALC: 32 % — LOW (ref 34.5–45)
HGB BLD-MCNC: 10.6 G/DL — LOW (ref 11.5–15.5)
IMM GRANULOCYTES NFR BLD AUTO: 0.5 % — SIGNIFICANT CHANGE UP (ref 0–0.9)
LYMPHOCYTES # BLD AUTO: 0.54 K/UL — LOW (ref 1–3.3)
LYMPHOCYTES # BLD AUTO: 24.3 % — SIGNIFICANT CHANGE UP (ref 13–44)
MAGNESIUM SERPL-MCNC: 2 MG/DL
MCHC RBC-ENTMCNC: 28.3 PG — SIGNIFICANT CHANGE UP (ref 27–34)
MCHC RBC-ENTMCNC: 33.1 G/DL — SIGNIFICANT CHANGE UP (ref 32–36)
MCV RBC AUTO: 85.3 FL — SIGNIFICANT CHANGE UP (ref 80–100)
MONOCYTES # BLD AUTO: 0.33 K/UL — SIGNIFICANT CHANGE UP (ref 0–0.9)
MONOCYTES NFR BLD AUTO: 14.9 % — HIGH (ref 2–14)
NEUTROPHILS # BLD AUTO: 1.27 K/UL — LOW (ref 1.8–7.4)
NEUTROPHILS NFR BLD AUTO: 57.1 % — SIGNIFICANT CHANGE UP (ref 43–77)
NRBC # BLD: 0 /100 WBCS — SIGNIFICANT CHANGE UP (ref 0–0)
PLATELET # BLD AUTO: 115 K/UL — LOW (ref 150–400)
POTASSIUM SERPL-SCNC: 5.1 MMOL/L
PROT SERPL-MCNC: 7.6 G/DL
RBC # BLD: 3.75 M/UL — LOW (ref 3.8–5.2)
RBC # FLD: 18.4 % — HIGH (ref 10.3–14.5)
SODIUM SERPL-SCNC: 142 MMOL/L
WBC # BLD: 2.22 K/UL — LOW (ref 3.8–10.5)
WBC # FLD AUTO: 2.22 K/UL — LOW (ref 3.8–10.5)

## 2024-08-23 PROCEDURE — G2211 COMPLEX E/M VISIT ADD ON: CPT

## 2024-08-23 PROCEDURE — 99213 OFFICE O/P EST LOW 20 MIN: CPT

## 2024-08-23 NOTE — HISTORY OF PRESENT ILLNESS
[Disease: _____________________] : Disease: [unfilled] [AJCC Stage: ____] : AJCC Stage: [unfilled] [de-identified] : 52 y.o female with newly diagnosed with cervical cancer in [facility].    Ms. Sharma, 52 years old, accompanied by her daughter, Tammy and is referred by Dr. Charles, for HSIL (22 from [facility]) cervical mass, thickened endometrium noted on CT (from [facility]). 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   Pt is without symptoms; she has not seen a GYN in over 10 years and recalls never having an abnormal pap smear. She returned home to the [facility] to help her mother with medical problems and decided to have a GYN checkup at that time. Pap smear returned HSIL / +HPV. Pathology noted SCC moderately differentiated and CT scan identified cervical mass; thickened lining of the uterus; lymphadenopathy as well as a right breast mass and right axillary lymphadenopathy.   She denies f/c/n/v/d/abnormal vaginal bleeding, changes to bowel or bladder habits. Denies unintentional weight loss or gain. Denies post-coital or intercycle vaginal bleeding; denies abdominal pain, distention, bloating, back pain or any other associated symptoms.   Imagin20 ([facility], [facility]) Breast: 1.6 x 1.7 cm tuberous, polycyclic contour, accumulating contrast medium, visualized at the borderline of the lower quadrants of the right breast, with no infiltration of the thoracic muscles.  Right axillary region: 1.4 x 1.0 cm; 1.0 x 1.0 cm and 0.8 x 1.1 cm. lymphadenopathy.  Liver: well defined even borders, the liver parenchyma is with signs of fat rearrangement, a hypdense inclusion 1.6 x 2.1 cm in size, is subcapsularly visualized Segment 8 of the liver. It lacunarly accumulates contrast medium, additionally accumulates contrast medium during the delayed phase. The intrahepatic and extrahepatic bile ducts are not dilated.  Abdominal lymph nodes: paraaortic and interaortocaval lymph nodes are sized 10-12 mm, weakly accumulate contrast medium.  No free fluid in the abdominal cavity.  Uterus: 5.9 x 7.5 cm.  EM: 15 mm with dense fluid visualized, mildly heterogeneous.  Cervix: tissue lesion 56 x 42 mm invading the uterine isthmus and body. The parametria are infiltrated.  Uterine appendages are normally positioned, a cystic inclusion of 3.3 x 3.5 cm is visualized in the projection of the left ovary.  Right Ovary: 2.0 x 2.8 cm.  Lymph Nodes: - right common iliac lymph node: 1.1 x 1.5 cm. - right pelvic wall node: 1.8 x 3.1 cm - left pelvic wall node: 2.4 x 3.0 cm.  - right external iliac lymph node: 1.8 x 1.6 cm   She was seen by Dr. Holm and had a biospy. Pathology: Squamous cell cancer. Patient had right breast biopsy yesterday, pending results.  SH: No smoking or drinking. 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  Menarche: 12 Menopause: still menstruating. LMP 22  She completed C6 of chemotherapy for cervical cancer on 23 with Cisplatin/Taxol/Bevacizumab/Pembrolizumab. Myesha d/c after 3 cycles due to mediport dehiscence. PET scan showed a good response to treatment and she is now going to start maintenance pembrolizumab.  Pet scan shows very good response in breast and lymph nodes from chemotherapy     (Cis/Taxol/Keytruda). Patient sees Dr. Perrin and Dr. Acosta for stage IIIC breast cancer.     Given Stage IV cervical cancer there is no indication for doing breast surgery at this time, as per the breast team. Patient started on Lupron and anastrozole.   She was on Keytruda maintenance from 23 through 24 (14 cycles) then was found to have POD. Of note she completed RT to cervix/pelvis total dose of 5936cGy in 28 fractions for increased adenopathy with Dr. Louis. Treatment was changed to Carboplatin retreat.      [FreeTextEntry1] : [place] Carboplatin C2 - 8/7/24 [de-identified] : Patient is here for follow up after second cycle of chemo. She just returned from traveling in [place] for 10 days, feeling well. She had one day of nausea post chemo but did not want to use anti-emetic because she does not want to take any more medicines. She denies abdominal pain, bloating, vomiting, bowel/bladder issues.

## 2024-08-23 NOTE — REVIEW OF SYSTEMS
[Diarrhea: Grade 0] : Diarrhea: Grade 0 [Negative] : Allergic/Immunologic [FreeTextEntry7] : one day of nausea, no vomiting

## 2024-08-24 ENCOUNTER — OUTPATIENT (OUTPATIENT)
Dept: OUTPATIENT SERVICES | Facility: HOSPITAL | Age: 54
LOS: 1 days | Discharge: HOME | End: 2024-08-24

## 2024-08-24 DIAGNOSIS — C53.9 MALIGNANT NEOPLASM OF CERVIX UTERI, UNSPECIFIED: ICD-10-CM

## 2024-08-26 ENCOUNTER — APPOINTMENT (OUTPATIENT)
Dept: HEMATOLOGY ONCOLOGY | Facility: CLINIC | Age: 54
End: 2024-08-26

## 2024-08-28 ENCOUNTER — RESULT REVIEW (OUTPATIENT)
Age: 54
End: 2024-08-28

## 2024-08-28 ENCOUNTER — APPOINTMENT (OUTPATIENT)
Dept: HEMATOLOGY ONCOLOGY | Facility: CLINIC | Age: 54
End: 2024-08-28

## 2024-08-28 ENCOUNTER — NON-APPOINTMENT (OUTPATIENT)
Age: 54
End: 2024-08-28

## 2024-08-28 ENCOUNTER — APPOINTMENT (OUTPATIENT)
Dept: INFUSION THERAPY | Facility: HOSPITAL | Age: 54
End: 2024-08-28

## 2024-08-28 LAB
BASOPHILS # BLD AUTO: 0.01 K/UL — SIGNIFICANT CHANGE UP (ref 0–0.2)
BASOPHILS NFR BLD AUTO: 0.4 % — SIGNIFICANT CHANGE UP (ref 0–2)
EOSINOPHIL # BLD AUTO: 0 K/UL — SIGNIFICANT CHANGE UP (ref 0–0.5)
EOSINOPHIL NFR BLD AUTO: 0 % — SIGNIFICANT CHANGE UP (ref 0–6)
HCT VFR BLD CALC: 32.4 % — LOW (ref 34.5–45)
HGB BLD-MCNC: 11.2 G/DL — LOW (ref 11.5–15.5)
IMM GRANULOCYTES NFR BLD AUTO: 0.7 % — SIGNIFICANT CHANGE UP (ref 0–0.9)
LYMPHOCYTES # BLD AUTO: 0.35 K/UL — LOW (ref 1–3.3)
LYMPHOCYTES # BLD AUTO: 12.5 % — LOW (ref 13–44)
MCHC RBC-ENTMCNC: 28.4 PG — SIGNIFICANT CHANGE UP (ref 27–34)
MCHC RBC-ENTMCNC: 34.6 G/DL — SIGNIFICANT CHANGE UP (ref 32–36)
MCV RBC AUTO: 82 FL — SIGNIFICANT CHANGE UP (ref 80–100)
MONOCYTES # BLD AUTO: 0.06 K/UL — SIGNIFICANT CHANGE UP (ref 0–0.9)
MONOCYTES NFR BLD AUTO: 2.1 % — SIGNIFICANT CHANGE UP (ref 2–14)
NEUTROPHILS # BLD AUTO: 2.37 K/UL — SIGNIFICANT CHANGE UP (ref 1.8–7.4)
NEUTROPHILS NFR BLD AUTO: 84.3 % — HIGH (ref 43–77)
NRBC # BLD: 0 /100 WBCS — SIGNIFICANT CHANGE UP (ref 0–0)
PLATELET # BLD AUTO: 113 K/UL — LOW (ref 150–400)
RBC # BLD: 3.95 M/UL — SIGNIFICANT CHANGE UP (ref 3.8–5.2)
RBC # FLD: 19.1 % — HIGH (ref 10.3–14.5)
WBC # BLD: 2.81 K/UL — LOW (ref 3.8–10.5)
WBC # FLD AUTO: 2.81 K/UL — LOW (ref 3.8–10.5)

## 2024-08-29 ENCOUNTER — APPOINTMENT (OUTPATIENT)
Dept: SURGICAL ONCOLOGY | Facility: CLINIC | Age: 54
End: 2024-08-29
Payer: COMMERCIAL

## 2024-08-29 VITALS
DIASTOLIC BLOOD PRESSURE: 75 MMHG | HEIGHT: 61 IN | SYSTOLIC BLOOD PRESSURE: 111 MMHG | BODY MASS INDEX: 28.32 KG/M2 | RESPIRATION RATE: 16 BRPM | WEIGHT: 150 LBS | HEART RATE: 69 BPM | OXYGEN SATURATION: 97 %

## 2024-08-29 DIAGNOSIS — R11.2 NAUSEA WITH VOMITING, UNSPECIFIED: ICD-10-CM

## 2024-08-29 DIAGNOSIS — Z51.11 ENCOUNTER FOR ANTINEOPLASTIC CHEMOTHERAPY: ICD-10-CM

## 2024-08-29 DIAGNOSIS — R59.9 ENLARGED LYMPH NODES, UNSPECIFIED: ICD-10-CM

## 2024-08-29 PROCEDURE — 99214 OFFICE O/P EST MOD 30 MIN: CPT

## 2024-09-04 ENCOUNTER — APPOINTMENT (OUTPATIENT)
Dept: HEMATOLOGY ONCOLOGY | Facility: CLINIC | Age: 54
End: 2024-09-04
Payer: COMMERCIAL

## 2024-09-04 ENCOUNTER — RESULT REVIEW (OUTPATIENT)
Age: 54
End: 2024-09-04

## 2024-09-04 VITALS
DIASTOLIC BLOOD PRESSURE: 72 MMHG | RESPIRATION RATE: 16 BRPM | HEART RATE: 76 BPM | BODY MASS INDEX: 28.95 KG/M2 | WEIGHT: 153.22 LBS | SYSTOLIC BLOOD PRESSURE: 111 MMHG | OXYGEN SATURATION: 97 % | TEMPERATURE: 97.3 F

## 2024-09-04 LAB
BASOPHILS # BLD AUTO: 0.01 K/UL — SIGNIFICANT CHANGE UP (ref 0–0.2)
BASOPHILS NFR BLD AUTO: 0.4 % — SIGNIFICANT CHANGE UP (ref 0–2)
EOSINOPHIL # BLD AUTO: 0.06 K/UL — SIGNIFICANT CHANGE UP (ref 0–0.5)
EOSINOPHIL NFR BLD AUTO: 2.6 % — SIGNIFICANT CHANGE UP (ref 0–6)
HCT VFR BLD CALC: 29.1 % — LOW (ref 34.5–45)
HGB BLD-MCNC: 10 G/DL — LOW (ref 11.5–15.5)
IMM GRANULOCYTES NFR BLD AUTO: 0.4 % — SIGNIFICANT CHANGE UP (ref 0–0.9)
LYMPHOCYTES # BLD AUTO: 0.64 K/UL — LOW (ref 1–3.3)
LYMPHOCYTES # BLD AUTO: 28.2 % — SIGNIFICANT CHANGE UP (ref 13–44)
MCHC RBC-ENTMCNC: 28.7 PG — SIGNIFICANT CHANGE UP (ref 27–34)
MCHC RBC-ENTMCNC: 34.4 G/DL — SIGNIFICANT CHANGE UP (ref 32–36)
MCV RBC AUTO: 83.6 FL — SIGNIFICANT CHANGE UP (ref 80–100)
MONOCYTES # BLD AUTO: 0.29 K/UL — SIGNIFICANT CHANGE UP (ref 0–0.9)
MONOCYTES NFR BLD AUTO: 12.8 % — SIGNIFICANT CHANGE UP (ref 2–14)
NEUTROPHILS # BLD AUTO: 1.26 K/UL — LOW (ref 1.8–7.4)
NEUTROPHILS NFR BLD AUTO: 55.6 % — SIGNIFICANT CHANGE UP (ref 43–77)
NRBC # BLD: 0 /100 WBCS — SIGNIFICANT CHANGE UP (ref 0–0)
PLATELET # BLD AUTO: 154 K/UL — SIGNIFICANT CHANGE UP (ref 150–400)
RBC # BLD: 3.48 M/UL — LOW (ref 3.8–5.2)
RBC # FLD: 19.6 % — HIGH (ref 10.3–14.5)
WBC # BLD: 2.27 K/UL — LOW (ref 3.8–10.5)
WBC # FLD AUTO: 2.27 K/UL — LOW (ref 3.8–10.5)

## 2024-09-04 PROCEDURE — 99214 OFFICE O/P EST MOD 30 MIN: CPT

## 2024-09-06 ENCOUNTER — RESULT REVIEW (OUTPATIENT)
Age: 54
End: 2024-09-06

## 2024-09-10 NOTE — HISTORY OF PRESENT ILLNESS
[Disease: _____________________] : Disease: [unfilled] [AJCC Stage: ____] : AJCC Stage: [unfilled] [de-identified] : 52 y.o female with newly diagnosed with cervical cancer in [facility].  Ms. Sharma, 52 years old, accompanied by her daughter, Tammy and is referred by Dr. Charles, for HSIL (22 from [facility]) cervical mass, thickened endometrium noted on CT (from [facility]). 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   Pt is without symptoms; she has not seen a GYN in over 10 years and recalls never having an abnormal pap smear. She returned home to the [facility] to help her mother with medical problems and decided to have a GYN checkup at that time. Pap smear returned HSIL / +HPV. Pathology noted SCC moderately differentiated and CT scan identified cervical mass; thickened lining of the uterus; lymphadenopathy as well as a right breast mass and right axillary lymphadenopathy.   She denies f/c/n/v/d/abnormal vaginal bleeding, changes to bowel or bladder habits. Denies unintentional weight loss or gain. Denies post-coital or intercycle vaginal bleeding; denies abdominal pain, distention, bloating, back pain or any other associated symptoms.   Imagin20 ([facility], [facility]) Breast: 1.6 x 1.7 cm tuberous, polycyclic contour, accumulating contrast medium, visualized at the borderline of the lower quadrants of the right breast, with no infiltration of the thoracic muscles.  Right axillary region: 1.4 x 1.0 cm; 1.0 x 1.0 cm and 0.8 x 1.1 cm. lymphadenopathy.  Liver: well defined even borders, the liver parenchyma is with signs of fat rearrangement, a hypdense inclusion 1.6 x 2.1 cm in size, is subcapsularly visualized Segment 8 of the liver. It lacunarly accumulates contrast medium, additionally accumulates contrast medium during the delayed phase. The intrahepatic and extrahepatic bile ducts are not dilated.  Abdominal lymph nodes: paraaortic and interaortocaval lymph nodes are sized 10-12 mm, weakly accumulate contrast medium.  No free fluid in the abdominal cavity.  Uterus: 5.9 x 7.5 cm.  EM: 15 mm with dense fluid visualized, mildly heterogeneous.  Cervix: tissue lesion 56 x 42 mm invading the uterine isthmus and body. The parametria are infiltrated.  Uterine appendages are normally positioned, a cystic inclusion of 3.3 x 3.5 cm is visualized in the projection of the left ovary.  Right Ovary: 2.0 x 2.8 cm.  Lymph Nodes: - right common iliac lymph node: 1.1 x 1.5 cm. - right pelvic wall node: 1.8 x 3.1 cm - left pelvic wall node: 2.4 x 3.0 cm.  - right external iliac lymph node: 1.8 x 1.6 cm   She was seen by Dr. Holm and had a biospy. Pathology: Squamous cell cancer. Patient had right breast biopsy yesterday, pending results.  SH: No smoking or drinking. 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  Menarche: 12 Menopause: still menstruating. LMP 22  She completed C6 of chemotherapy for cervical cancer on 23 with Cisplatin/Taxol/Bevacizumab/Pembrolizumab. Myesha d/c after 3 cycles due to mediport dehiscence. PET scan showed a good response to treatment and she is now going to start maintenance pembrolizumab.  Pet scan shows very good response in breast and lymph nodes from chemotherapy     (Cis/Taxol/Keytruda). Patient sees Dr. Perrin and Dr. Acosta for stage IIIC breast cancer.     Given Stage IV cervical cancer there is no indication for doing breast surgery at this time, as per the breast team. Patient started on Lupron and anastrozole.   She was on Keytruda maintenance from 23 through 24 (14 cycles) then was found to have POD. Of note she completed RT to cervix/pelvis total dose of 5936cGy in 28 fractions for increased adenopathy with Dr. Louis. Treatment was changed to Carboplatin retreat and completed 3 cycles 2024 [FreeTextEntry1] : [place] Carboplatin C2 - 8/7/24 [de-identified] : Patient is here for follow up after third cycle of carbo retreat accompanied by daughter.  Reports to be overall feeling well, no new complaints at this times. She denies abdominal pain, bloating, vomiting, bowel/bladder issues, numbness/tingling.

## 2024-09-10 NOTE — HISTORY OF PRESENT ILLNESS
[Disease: _____________________] : Disease: [unfilled] [AJCC Stage: ____] : AJCC Stage: [unfilled] [de-identified] : 52 y.o female with newly diagnosed with cervical cancer in [facility].  Ms. Sharma, 52 years old, accompanied by her daughter, Tammy and is referred by Dr. Charles, for HSIL (22 from [facility]) cervical mass, thickened endometrium noted on CT (from [facility]). 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   Pt is without symptoms; she has not seen a GYN in over 10 years and recalls never having an abnormal pap smear. She returned home to the [facility] to help her mother with medical problems and decided to have a GYN checkup at that time. Pap smear returned HSIL / +HPV. Pathology noted SCC moderately differentiated and CT scan identified cervical mass; thickened lining of the uterus; lymphadenopathy as well as a right breast mass and right axillary lymphadenopathy.   She denies f/c/n/v/d/abnormal vaginal bleeding, changes to bowel or bladder habits. Denies unintentional weight loss or gain. Denies post-coital or intercycle vaginal bleeding; denies abdominal pain, distention, bloating, back pain or any other associated symptoms.   Imagin20 ([facility], [facility]) Breast: 1.6 x 1.7 cm tuberous, polycyclic contour, accumulating contrast medium, visualized at the borderline of the lower quadrants of the right breast, with no infiltration of the thoracic muscles.  Right axillary region: 1.4 x 1.0 cm; 1.0 x 1.0 cm and 0.8 x 1.1 cm. lymphadenopathy.  Liver: well defined even borders, the liver parenchyma is with signs of fat rearrangement, a hypdense inclusion 1.6 x 2.1 cm in size, is subcapsularly visualized Segment 8 of the liver. It lacunarly accumulates contrast medium, additionally accumulates contrast medium during the delayed phase. The intrahepatic and extrahepatic bile ducts are not dilated.  Abdominal lymph nodes: paraaortic and interaortocaval lymph nodes are sized 10-12 mm, weakly accumulate contrast medium.  No free fluid in the abdominal cavity.  Uterus: 5.9 x 7.5 cm.  EM: 15 mm with dense fluid visualized, mildly heterogeneous.  Cervix: tissue lesion 56 x 42 mm invading the uterine isthmus and body. The parametria are infiltrated.  Uterine appendages are normally positioned, a cystic inclusion of 3.3 x 3.5 cm is visualized in the projection of the left ovary.  Right Ovary: 2.0 x 2.8 cm.  Lymph Nodes: - right common iliac lymph node: 1.1 x 1.5 cm. - right pelvic wall node: 1.8 x 3.1 cm - left pelvic wall node: 2.4 x 3.0 cm.  - right external iliac lymph node: 1.8 x 1.6 cm   She was seen by Dr. Holm and had a biospy. Pathology: Squamous cell cancer. Patient had right breast biopsy yesterday, pending results.  SH: No smoking or drinking. 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  Menarche: 12 Menopause: still menstruating. LMP 22  She completed C6 of chemotherapy for cervical cancer on 23 with Cisplatin/Taxol/Bevacizumab/Pembrolizumab. Myesha d/c after 3 cycles due to mediport dehiscence. PET scan showed a good response to treatment and she is now going to start maintenance pembrolizumab.  Pet scan shows very good response in breast and lymph nodes from chemotherapy     (Cis/Taxol/Keytruda). Patient sees Dr. Perrin and Dr. Acosta for stage IIIC breast cancer.     Given Stage IV cervical cancer there is no indication for doing breast surgery at this time, as per the breast team. Patient started on Lupron and anastrozole.   She was on Keytruda maintenance from 23 through 24 (14 cycles) then was found to have POD. Of note she completed RT to cervix/pelvis total dose of 5936cGy in 28 fractions for increased adenopathy with Dr. Louis. Treatment was changed to Carboplatin retreat and completed 3 cycles 2024 [FreeTextEntry1] : [place] Carboplatin C2 - 8/7/24 [de-identified] : Patient is here for follow up after third cycle of carbo retreat accompanied by daughter.  Reports to be overall feeling well, no new complaints at this times. She denies abdominal pain, bloating, vomiting, bowel/bladder issues, numbness/tingling.

## 2024-09-11 ENCOUNTER — APPOINTMENT (OUTPATIENT)
Dept: GYNECOLOGIC ONCOLOGY | Facility: CLINIC | Age: 54
End: 2024-09-11
Payer: COMMERCIAL

## 2024-09-11 VITALS
HEART RATE: 72 BPM | DIASTOLIC BLOOD PRESSURE: 84 MMHG | HEIGHT: 61 IN | RESPIRATION RATE: 16 BRPM | SYSTOLIC BLOOD PRESSURE: 121 MMHG | BODY MASS INDEX: 28.32 KG/M2 | OXYGEN SATURATION: 95 % | TEMPERATURE: 97.1 F | WEIGHT: 150 LBS

## 2024-09-11 DIAGNOSIS — C53.9 MALIGNANT NEOPLASM OF CERVIX UTERI, UNSPECIFIED: ICD-10-CM

## 2024-09-11 PROCEDURE — 99459 PELVIC EXAMINATION: CPT

## 2024-09-11 PROCEDURE — 99214 OFFICE O/P EST MOD 30 MIN: CPT

## 2024-09-12 ENCOUNTER — APPOINTMENT (OUTPATIENT)
Dept: HEMATOLOGY ONCOLOGY | Facility: CLINIC | Age: 54
End: 2024-09-12
Payer: COMMERCIAL

## 2024-09-12 VITALS
HEART RATE: 72 BPM | BODY MASS INDEX: 28.74 KG/M2 | RESPIRATION RATE: 16 BRPM | TEMPERATURE: 98.9 F | WEIGHT: 152.12 LBS | DIASTOLIC BLOOD PRESSURE: 76 MMHG | SYSTOLIC BLOOD PRESSURE: 113 MMHG

## 2024-09-12 DIAGNOSIS — C53.9 MALIGNANT NEOPLASM OF CERVIX UTERI, UNSPECIFIED: ICD-10-CM

## 2024-09-12 DIAGNOSIS — M25.50 PAIN IN UNSPECIFIED JOINT: ICD-10-CM

## 2024-09-12 DIAGNOSIS — C50.911 MALIGNANT NEOPLASM OF UNSPECIFIED SITE OF RIGHT FEMALE BREAST: ICD-10-CM

## 2024-09-12 DIAGNOSIS — T45.1X5A PAIN IN UNSPECIFIED JOINT: ICD-10-CM

## 2024-09-12 PROCEDURE — 99214 OFFICE O/P EST MOD 30 MIN: CPT

## 2024-09-12 PROCEDURE — G2211 COMPLEX E/M VISIT ADD ON: CPT

## 2024-09-12 NOTE — HISTORY OF PRESENT ILLNESS
[Disease: _____________________] : Disease: [unfilled] [T: ___] : T[unfilled] [N: ___] : N[unfilled] [M: ___] : M[unfilled] [AJCC Stage: ____] : AJCC Stage: [unfilled] [de-identified] : Right breast IDC with extensive lymphovascular invasion, axillary LN positive, ER 95%, HI 95%, HER-2 2+ FISH negative [de-identified] : Cervical cancer diagnosed at age 52 in 2022 while in [facility] Right breast cancer diagnosed simultaneously at age 52 7/2022 Pap smear revealed HSIL and +HPV.  Pathology noted SCCA, moderately differentiated 7/22/22 CT scan in [facility] identified cervical mass, 5.6 x 4.2 cm, invading the uterine isthmus and body. The parametria are infiltrated. Thickened uterine lining. Lymphadenopathy with right common iliac lymph node: 1.1 x 1.5 cm, right pelvic wall node: 1.8 x 3.1 cm, left pelvic wall node: 2.4 x 3.0 cm, right external iliac lymph node: 1.8 x 1.6 cm  CT scan also reported a 1.6 x 1.7 cm tuberous mass at the borderline of the lower quadrant of the right breast, with no infiltration of the thoracic muscles.  Right axillary lymphadenopathy measuring 1.4 x 1.0 cm, 1.0 x 1.0 cm and 0.8 x 1.1 cm.  8/10/22 Cervical biopsy revealed high grade squamous intraepithelial lesion (HGSIL) (CIN3) with PD-L1 Tumor Proportion Score (TPS) 30%, positive 8/18/22 PET/CT scan showed a large area of intense activity within the uterine cavity extending into the uterine cervix with SUV 19.5. FDG avid retroperitoneal and bilateral pelvic lymphadenopathy (SUV 7.4 - 21.1). FDG avid right breast nodule (SUV 13.9) and right retropectoral (SUV 6.1), axillary (SUV 18.2) and internal mammary lymph nodes (SUV 5.8) suspicious for metastasis from breast. Mildly FDG avid prevascular and left retropectoral LN are indeterminate. Nonspecific FDG avidity in bilateral palatine tonsils, probably inflammatory. Nonspecific generalized BM hypermetabolism without CT correlate, probably reactive and may be related to anemia. 8/19/22 Mammogram and sonogram revealed heterogeneously dense breast parenchyma and an irregularly marginated mass in the upper outer quadrant  of the right breast with adjacent clusters of indeterminate calcifications. Sonogram revealed a 1.0 x 1.2 cm mass at 10:00. BI-RADS 5 8/23/22 Right breast biopsy revealed moderately to poorly differentiated IDC with extensive lymphovascular invasion, ER 95%, OR 95%, HER-2 2+ FISH negative 8/26/22 Right axillary LN revealed metastatic adenocarcinoma c/w breast origin.  8/26/22 Left supraclavicular LN positive for metastatic squamous cell carcinoma consistent with Stage IV cervical cancer 9/1/22 - 1/4/23 Chemotherapy for cervical cancer with 6 cycles of Cisplatin/Taxol/Bevacizumab/Pembrolizumab. Bevacizumab d/c after 3 cycles due to mediport dehiscence 1/11/23 PET/CT scan showed interval response to therapy with residual low level FDG avidity in lymph nodes in the pelvis similar to blood pool and no new lesions. Interval resolution of FDG-avid right breast lesion and retropectoral, right axillary, mediastinal, and right internal mammary lymph nodes. Low-level symmetrical FDG avidity in the christopher, nonspecific.  No FDG avid lymph nodes in the axilla above background. Interval decrease in size and FDG avidity of pelvic lymphadenopathy. Reference nodes: -Interval resolution of right internal iliac node; previously measuring approximately 1.1 cm, SUV 14. -Right external iliac mass, SUV 3.7 measuring 1.4 x 0.7 cm, image 220; previously 3 x 1.9 cm, SUV 21. -Right internal iliac node, no discernible FDG avidity, 0.8 x 0.6 cm, image 217; previously SUV 7.4, 1.6 x 1.5 cm. -Left pelvic sidewall mass, SUV 3.3, 1.7 x 0.6 cm, image 224; previously SUV 20, 3.4 x 2.2 cm (SUV 20.2; image 231). 9/14/22 - 1/4/23 Cisplatin/Taxol/Bevacizumab/Pembrolizumab for cervical cancer. Bevacizumab d/c after 3 cycles due to mediport dehiscence.  1/11/23 PET scan showed a good response to treatment. 2/1/23 Maintenance pembrolizumab started and to continue every 6 weeks. 2/23 Lupron started and to continue every 3 months with anastrozole 1 mg daily.   [de-identified] : Shari started Lupron and anastrozole in 2/2023. She is accompanied by her daughter Tammy, who also served as an . She continues on Carboplatin with Dr. Charles for the cervical cancer. She completed C6 of chemotherapy for cervical cancer on 1/4/23 with Cisplatin/Taxol/Bevacizumab/Pembrolizumab. Myesha d/c after 3 cycles due to mediport dehiscence.  PET scan showed a good response to treatment and she started maintenance pembrolizumab every 3 weeks, later decreased to every 6 weeks. PET scan from 12/13/23 showed intense activity in the upper cervix extending to the lower uterus with hypermetabolic right external iliac nodes suspicious for recurrence. This was reviewed by Dr. Charles and she was referred to RT for further evaluation and management.  She started Lupron and anastrozole in 2/23 and is tolerating them very well with mild arthralgias with intense stiffness in the morning better with activity.  She denies any hot flashes and rare hot flashes. She c/o intense "buttock pain " after each and every Eligard injection, often times lasting for week or longer after the injection. Before starting the Lupron her periods were very regular, coming monthly. Although in 2020 she was getting her period for over a trigger, but triggered after the COVID vaccine as per daughter. 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 She is on maintenance pembrolizumab every 6 weeks and had an MRI pelvis on 3/12/24 and is due for CT scan sometime this month.  HEALTH MAINTENANCE: PCP: Dr. Heather Kumari Mammogram and breast ultrasound: 8/14/23 BI-RADS 4A Known biopsy-proven cancer in the right 10:00 location in association with clip and numerous calcifications spanning at least 5 cm on the mammogram for which appropriate surgical and oncologic management is recommended. A6 mm nodule in the right 2:00 location for which ultrasound guided core biopsy is again recommended. Findings and recommendations were conveyed to the patient her daughter as well as Dr. Acosta on 8/14/2023. The ultrasound-guided core biopsy is not being performed at the current time as per Dr. Acosta given patient's clinical extent of disease Bone density: 9/13/23 showed T scores of -1.2 in spine, -1.8 in femoral neck and -1.2 in total hip Pap Smear: 7/22 Colonoscopy: none

## 2024-09-13 ENCOUNTER — OUTPATIENT (OUTPATIENT)
Dept: OUTPATIENT SERVICES | Facility: HOSPITAL | Age: 54
LOS: 1 days | End: 2024-09-13
Payer: COMMERCIAL

## 2024-09-13 ENCOUNTER — APPOINTMENT (OUTPATIENT)
Dept: CT IMAGING | Facility: CLINIC | Age: 54
End: 2024-09-13
Payer: COMMERCIAL

## 2024-09-13 DIAGNOSIS — C53.9 MALIGNANT NEOPLASM OF CERVIX UTERI, UNSPECIFIED: ICD-10-CM

## 2024-09-13 PROCEDURE — 74177 CT ABD & PELVIS W/CONTRAST: CPT | Mod: 26

## 2024-09-13 PROCEDURE — 74177 CT ABD & PELVIS W/CONTRAST: CPT

## 2024-09-13 PROCEDURE — 71260 CT THORAX DX C+: CPT | Mod: 26

## 2024-09-13 PROCEDURE — 71260 CT THORAX DX C+: CPT

## 2024-09-18 ENCOUNTER — RESULT REVIEW (OUTPATIENT)
Age: 54
End: 2024-09-18

## 2024-09-18 ENCOUNTER — APPOINTMENT (OUTPATIENT)
Dept: HEMATOLOGY ONCOLOGY | Facility: CLINIC | Age: 54
End: 2024-09-18

## 2024-09-18 ENCOUNTER — APPOINTMENT (OUTPATIENT)
Dept: INFUSION THERAPY | Facility: HOSPITAL | Age: 54
End: 2024-09-18

## 2024-09-18 LAB
BASOPHILS # BLD AUTO: 0 K/UL — SIGNIFICANT CHANGE UP (ref 0–0.2)
BASOPHILS NFR BLD AUTO: 0 % — SIGNIFICANT CHANGE UP (ref 0–2)
EOSINOPHIL # BLD AUTO: 0 K/UL — SIGNIFICANT CHANGE UP (ref 0–0.5)
EOSINOPHIL NFR BLD AUTO: 0 % — SIGNIFICANT CHANGE UP (ref 0–6)
HCT VFR BLD CALC: 31.9 % — LOW (ref 34.5–45)
HGB BLD-MCNC: 11.1 G/DL — LOW (ref 11.5–15.5)
IMM GRANULOCYTES NFR BLD AUTO: 0.8 % — SIGNIFICANT CHANGE UP (ref 0–0.9)
LYMPHOCYTES # BLD AUTO: 0.34 K/UL — LOW (ref 1–3.3)
LYMPHOCYTES # BLD AUTO: 13.8 % — SIGNIFICANT CHANGE UP (ref 13–44)
MCHC RBC-ENTMCNC: 29.5 PG — SIGNIFICANT CHANGE UP (ref 27–34)
MCHC RBC-ENTMCNC: 34.8 G/DL — SIGNIFICANT CHANGE UP (ref 32–36)
MCV RBC AUTO: 84.8 FL — SIGNIFICANT CHANGE UP (ref 80–100)
MONOCYTES # BLD AUTO: 0.08 K/UL — SIGNIFICANT CHANGE UP (ref 0–0.9)
MONOCYTES NFR BLD AUTO: 3.3 % — SIGNIFICANT CHANGE UP (ref 2–14)
NEUTROPHILS # BLD AUTO: 2.02 K/UL — SIGNIFICANT CHANGE UP (ref 1.8–7.4)
NEUTROPHILS NFR BLD AUTO: 82.1 % — HIGH (ref 43–77)
NRBC # BLD: 0 /100 WBCS — SIGNIFICANT CHANGE UP (ref 0–0)
PLATELET # BLD AUTO: 101 K/UL — LOW (ref 150–400)
RBC # BLD: 3.76 M/UL — LOW (ref 3.8–5.2)
RBC # FLD: 21.6 % — HIGH (ref 10.3–14.5)
WBC # BLD: 2.46 K/UL — LOW (ref 3.8–10.5)
WBC # FLD AUTO: 2.46 K/UL — LOW (ref 3.8–10.5)

## 2024-09-19 ENCOUNTER — NON-APPOINTMENT (OUTPATIENT)
Age: 54
End: 2024-09-19

## 2024-09-20 ENCOUNTER — APPOINTMENT (OUTPATIENT)
Dept: CT IMAGING | Facility: CLINIC | Age: 54
End: 2024-09-20

## 2024-09-25 ENCOUNTER — RESULT REVIEW (OUTPATIENT)
Age: 54
End: 2024-09-25

## 2024-09-25 ENCOUNTER — APPOINTMENT (OUTPATIENT)
Dept: HEMATOLOGY ONCOLOGY | Facility: CLINIC | Age: 54
End: 2024-09-25
Payer: COMMERCIAL

## 2024-09-25 VITALS
TEMPERATURE: 99 F | OXYGEN SATURATION: 100 % | BODY MASS INDEX: 28.33 KG/M2 | DIASTOLIC BLOOD PRESSURE: 68 MMHG | SYSTOLIC BLOOD PRESSURE: 97 MMHG | RESPIRATION RATE: 15 BRPM | HEART RATE: 63 BPM | WEIGHT: 149.91 LBS

## 2024-09-25 DIAGNOSIS — C53.9 MALIGNANT NEOPLASM OF CERVIX UTERI, UNSPECIFIED: ICD-10-CM

## 2024-09-25 LAB
ALBUMIN SERPL ELPH-MCNC: 4.4 G/DL
ALP BLD-CCNC: 91 U/L
ALT SERPL-CCNC: 19 U/L
ANION GAP SERPL CALC-SCNC: 12 MMOL/L
AST SERPL-CCNC: 21 U/L
BASOPHILS # BLD AUTO: 0.02 K/UL — SIGNIFICANT CHANGE UP (ref 0–0.2)
BASOPHILS NFR BLD AUTO: 0.9 % — SIGNIFICANT CHANGE UP (ref 0–2)
BILIRUB SERPL-MCNC: 0.3 MG/DL
BUN SERPL-MCNC: 17 MG/DL
CALCIUM SERPL-MCNC: 9.6 MG/DL
CHLORIDE SERPL-SCNC: 100 MMOL/L
CO2 SERPL-SCNC: 26 MMOL/L
CREAT SERPL-MCNC: 0.86 MG/DL
EGFR: 80 ML/MIN/1.73M2
EOSINOPHIL # BLD AUTO: 0.06 K/UL — SIGNIFICANT CHANGE UP (ref 0–0.5)
EOSINOPHIL NFR BLD AUTO: 2.8 % — SIGNIFICANT CHANGE UP (ref 0–6)
GLUCOSE SERPL-MCNC: 94 MG/DL
HCT VFR BLD CALC: 29.1 % — LOW (ref 34.5–45)
HGB BLD-MCNC: 10 G/DL — LOW (ref 11.5–15.5)
IMM GRANULOCYTES NFR BLD AUTO: 0.5 % — SIGNIFICANT CHANGE UP (ref 0–0.9)
LYMPHOCYTES # BLD AUTO: 0.46 K/UL — LOW (ref 1–3.3)
LYMPHOCYTES # BLD AUTO: 21.3 % — SIGNIFICANT CHANGE UP (ref 13–44)
MAGNESIUM SERPL-MCNC: 2 MG/DL
MCHC RBC-ENTMCNC: 30.5 PG — SIGNIFICANT CHANGE UP (ref 27–34)
MCHC RBC-ENTMCNC: 34.4 G/DL — SIGNIFICANT CHANGE UP (ref 32–36)
MCV RBC AUTO: 88.7 FL — SIGNIFICANT CHANGE UP (ref 80–100)
MONOCYTES # BLD AUTO: 0.29 K/UL — SIGNIFICANT CHANGE UP (ref 0–0.9)
MONOCYTES NFR BLD AUTO: 13.4 % — SIGNIFICANT CHANGE UP (ref 2–14)
NEUTROPHILS # BLD AUTO: 1.32 K/UL — LOW (ref 1.8–7.4)
NEUTROPHILS NFR BLD AUTO: 61.1 % — SIGNIFICANT CHANGE UP (ref 43–77)
NRBC # BLD: 0 /100 WBCS — SIGNIFICANT CHANGE UP (ref 0–0)
PLATELET # BLD AUTO: 103 K/UL — LOW (ref 150–400)
POTASSIUM SERPL-SCNC: 5.5 MMOL/L
PROT SERPL-MCNC: 7.3 G/DL
RBC # BLD: 3.28 M/UL — LOW (ref 3.8–5.2)
RBC # FLD: 21.3 % — HIGH (ref 10.3–14.5)
SODIUM SERPL-SCNC: 138 MMOL/L
WBC # BLD: 2.16 K/UL — LOW (ref 3.8–10.5)
WBC # FLD AUTO: 2.16 K/UL — LOW (ref 3.8–10.5)

## 2024-09-25 PROCEDURE — G2211 COMPLEX E/M VISIT ADD ON: CPT

## 2024-09-25 PROCEDURE — 99213 OFFICE O/P EST LOW 20 MIN: CPT

## 2024-09-25 NOTE — HISTORY OF PRESENT ILLNESS
[Disease: _____________________] : Disease: [unfilled] [AJCC Stage: ____] : AJCC Stage: [unfilled] [de-identified] : 52 y.o female with newly diagnosed with cervical cancer in [facility].  Ms. Sharma, 52 years old, accompanied by her daughter, Tammy and is referred by Dr. Charles, for HSIL (22 from [facility]) cervical mass, thickened endometrium noted on CT (from [facility]). 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   Pt is without symptoms; she has not seen a GYN in over 10 years and recalls never having an abnormal pap smear. She returned home to the [facility] to help her mother with medical problems and decided to have a GYN checkup at that time. Pap smear returned HSIL / +HPV. Pathology noted SCC moderately differentiated and CT scan identified cervical mass; thickened lining of the uterus; lymphadenopathy as well as a right breast mass and right axillary lymphadenopathy.   She denies f/c/n/v/d/abnormal vaginal bleeding, changes to bowel or bladder habits. Denies unintentional weight loss or gain. Denies post-coital or intercycle vaginal bleeding; denies abdominal pain, distention, bloating, back pain or any other associated symptoms.   Imagin20 ([facility], [facility]) Breast: 1.6 x 1.7 cm tuberous, polycyclic contour, accumulating contrast medium, visualized at the borderline of the lower quadrants of the right breast, with no infiltration of the thoracic muscles.  Right axillary region: 1.4 x 1.0 cm; 1.0 x 1.0 cm and 0.8 x 1.1 cm. lymphadenopathy.  Liver: well defined even borders, the liver parenchyma is with signs of fat rearrangement, a hypdense inclusion 1.6 x 2.1 cm in size, is subcapsularly visualized Segment 8 of the liver. It lacunarly accumulates contrast medium, additionally accumulates contrast medium during the delayed phase. The intrahepatic and extrahepatic bile ducts are not dilated.  Abdominal lymph nodes: paraaortic and interaortocaval lymph nodes are sized 10-12 mm, weakly accumulate contrast medium.  No free fluid in the abdominal cavity.  Uterus: 5.9 x 7.5 cm.  EM: 15 mm with dense fluid visualized, mildly heterogeneous.  Cervix: tissue lesion 56 x 42 mm invading the uterine isthmus and body. The parametria are infiltrated.  Uterine appendages are normally positioned, a cystic inclusion of 3.3 x 3.5 cm is visualized in the projection of the left ovary.  Right Ovary: 2.0 x 2.8 cm.  Lymph Nodes: - right common iliac lymph node: 1.1 x 1.5 cm. - right pelvic wall node: 1.8 x 3.1 cm - left pelvic wall node: 2.4 x 3.0 cm.  - right external iliac lymph node: 1.8 x 1.6 cm   She was seen by Dr. Holm and had a biospy. Pathology: Squamous cell cancer. Patient had right breast biopsy yesterday, pending results.  SH: No smoking or drinking. 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  Menarche: 12 Menopause: still menstruating. LMP 22  She completed C6 of chemotherapy for cervical cancer on 23 with Cisplatin/Taxol/Bevacizumab/Pembrolizumab. Myesha d/c after 3 cycles due to mediport dehiscence. PET scan showed a good response to treatment and she is now going to start maintenance pembrolizumab.  Pet scan shows very good response in breast and lymph nodes from chemotherapy     (Cis/Taxol/Keytruda). Patient sees Dr. Perrin and Dr. Acosta for stage IIIC breast cancer.     Given Stage IV cervical cancer there is no indication for doing breast surgery at this time, as per the breast team. Patient started on Lupron and anastrozole.   She was on Keytruda maintenance from 23 through 24 (14 cycles) then was found to have POD. Of note she completed RT to cervix/pelvis total dose of [id number] in 28 fractions for increased adenopathy with Dr. Louis. Treatment was changed to Carboplatin retreat and completed 3 cycles 2024 [FreeTextEntry1] : [facility] Carboplatin C4 - 9/18/24 [de-identified] : Patient is here for follow up, continues to tolerate treatment well. She had one day of nausea and a few days of constipation after treatment, now resolved. Appetite is good. She continues to work full time. Denies chest pain, SOB, abdominal pain, bloating, nausea, vomiting, bleeding, neuropathy.

## 2024-09-25 NOTE — REVIEW OF SYSTEMS
[Diarrhea: Grade 0] : Diarrhea: Grade 0 [Negative] : Allergic/Immunologic [Constipation] : constipation [FreeTextEntry7] : one day of nausea, no vomiting

## 2024-10-09 ENCOUNTER — APPOINTMENT (OUTPATIENT)
Dept: INFUSION THERAPY | Facility: HOSPITAL | Age: 54
End: 2024-10-09

## 2024-10-09 ENCOUNTER — RESULT REVIEW (OUTPATIENT)
Age: 54
End: 2024-10-09

## 2024-10-09 ENCOUNTER — APPOINTMENT (OUTPATIENT)
Dept: HEMATOLOGY ONCOLOGY | Facility: CLINIC | Age: 54
End: 2024-10-09

## 2024-10-09 LAB
BASOPHILS # BLD AUTO: 0.01 K/UL — SIGNIFICANT CHANGE UP (ref 0–0.2)
BASOPHILS NFR BLD AUTO: 0.6 % — SIGNIFICANT CHANGE UP (ref 0–2)
EOSINOPHIL # BLD AUTO: 0.01 K/UL — SIGNIFICANT CHANGE UP (ref 0–0.5)
EOSINOPHIL NFR BLD AUTO: 0.6 % — SIGNIFICANT CHANGE UP (ref 0–6)
HCT VFR BLD CALC: 30.1 % — LOW (ref 34.5–45)
HGB BLD-MCNC: 10.9 G/DL — LOW (ref 11.5–15.5)
IMM GRANULOCYTES NFR BLD AUTO: 1.1 % — HIGH (ref 0–0.9)
LYMPHOCYTES # BLD AUTO: 0.33 K/UL — LOW (ref 1–3.3)
LYMPHOCYTES # BLD AUTO: 18.6 % — SIGNIFICANT CHANGE UP (ref 13–44)
MCHC RBC-ENTMCNC: 31.8 PG — SIGNIFICANT CHANGE UP (ref 27–34)
MCHC RBC-ENTMCNC: 36.2 G/DL — HIGH (ref 32–36)
MCV RBC AUTO: 87.8 FL — SIGNIFICANT CHANGE UP (ref 80–100)
MONOCYTES # BLD AUTO: 0.08 K/UL — SIGNIFICANT CHANGE UP (ref 0–0.9)
MONOCYTES NFR BLD AUTO: 4.5 % — SIGNIFICANT CHANGE UP (ref 2–14)
NEUTROPHILS # BLD AUTO: 1.32 K/UL — LOW (ref 1.8–7.4)
NEUTROPHILS NFR BLD AUTO: 74.6 % — SIGNIFICANT CHANGE UP (ref 43–77)
NRBC # BLD: 0 /100 WBCS — SIGNIFICANT CHANGE UP (ref 0–0)
PLATELET # BLD AUTO: 82 K/UL — LOW (ref 150–400)
RBC # BLD: 3.43 M/UL — LOW (ref 3.8–5.2)
RBC # FLD: 21.5 % — HIGH (ref 10.3–14.5)
WBC # BLD: 1.77 K/UL — LOW (ref 3.8–10.5)
WBC # FLD AUTO: 1.77 K/UL — LOW (ref 3.8–10.5)

## 2024-10-16 ENCOUNTER — APPOINTMENT (OUTPATIENT)
Dept: HEMATOLOGY ONCOLOGY | Facility: CLINIC | Age: 54
End: 2024-10-16

## 2024-10-16 ENCOUNTER — RESULT REVIEW (OUTPATIENT)
Age: 54
End: 2024-10-16

## 2024-10-16 ENCOUNTER — APPOINTMENT (OUTPATIENT)
Dept: INFUSION THERAPY | Facility: HOSPITAL | Age: 54
End: 2024-10-16

## 2024-10-16 ENCOUNTER — LABORATORY RESULT (OUTPATIENT)
Age: 54
End: 2024-10-16

## 2024-10-16 LAB
ANION GAP SERPL CALC-SCNC: 17 MMOL/L — SIGNIFICANT CHANGE UP (ref 5–17)
BASOPHILS # BLD AUTO: 0 K/UL — SIGNIFICANT CHANGE UP (ref 0–0.2)
BASOPHILS NFR BLD AUTO: 0 % — SIGNIFICANT CHANGE UP (ref 0–2)
BUN SERPL-MCNC: 16 MG/DL — SIGNIFICANT CHANGE UP (ref 7–23)
CALCIUM SERPL-MCNC: 10.7 MG/DL — HIGH (ref 8.4–10.5)
CHLORIDE SERPL-SCNC: 103 MMOL/L — SIGNIFICANT CHANGE UP (ref 96–108)
CO2 SERPL-SCNC: 22 MMOL/L — SIGNIFICANT CHANGE UP (ref 22–31)
CREAT SERPL-MCNC: 0.69 MG/DL — SIGNIFICANT CHANGE UP (ref 0.5–1.3)
EGFR: 103 ML/MIN/1.73M2 — SIGNIFICANT CHANGE UP
EOSINOPHIL # BLD AUTO: 0 K/UL — SIGNIFICANT CHANGE UP (ref 0–0.5)
EOSINOPHIL NFR BLD AUTO: 0 % — SIGNIFICANT CHANGE UP (ref 0–6)
GLUCOSE SERPL-MCNC: 155 MG/DL — HIGH (ref 70–99)
HCT VFR BLD CALC: 31 % — LOW (ref 34.5–45)
HGB BLD-MCNC: 10.8 G/DL — LOW (ref 11.5–15.5)
IMM GRANULOCYTES NFR BLD AUTO: 0.7 % — SIGNIFICANT CHANGE UP (ref 0–0.9)
LYMPHOCYTES # BLD AUTO: 0.36 K/UL — LOW (ref 1–3.3)
LYMPHOCYTES # BLD AUTO: 13.4 % — SIGNIFICANT CHANGE UP (ref 13–44)
MCHC RBC-ENTMCNC: 31.4 PG — SIGNIFICANT CHANGE UP (ref 27–34)
MCHC RBC-ENTMCNC: 34.8 G/DL — SIGNIFICANT CHANGE UP (ref 32–36)
MCV RBC AUTO: 90.1 FL — SIGNIFICANT CHANGE UP (ref 80–100)
MONOCYTES # BLD AUTO: 0.1 K/UL — SIGNIFICANT CHANGE UP (ref 0–0.9)
MONOCYTES NFR BLD AUTO: 3.7 % — SIGNIFICANT CHANGE UP (ref 2–14)
NEUTROPHILS # BLD AUTO: 2.2 K/UL — SIGNIFICANT CHANGE UP (ref 1.8–7.4)
NEUTROPHILS NFR BLD AUTO: 82.2 % — HIGH (ref 43–77)
NRBC # BLD: 0 /100 WBCS — SIGNIFICANT CHANGE UP (ref 0–0)
PLATELET # BLD AUTO: 137 K/UL — LOW (ref 150–400)
POTASSIUM SERPL-MCNC: 4.1 MMOL/L — SIGNIFICANT CHANGE UP (ref 3.5–5.3)
POTASSIUM SERPL-SCNC: 4.1 MMOL/L — SIGNIFICANT CHANGE UP (ref 3.5–5.3)
RBC # BLD: 3.44 M/UL — LOW (ref 3.8–5.2)
RBC # FLD: 20.9 % — HIGH (ref 10.3–14.5)
SODIUM SERPL-SCNC: 142 MMOL/L — SIGNIFICANT CHANGE UP (ref 135–145)
WBC # BLD: 2.68 K/UL — LOW (ref 3.8–10.5)
WBC # FLD AUTO: 2.68 K/UL — LOW (ref 3.8–10.5)

## 2024-10-17 LAB
ALBUMIN SERPL ELPH-MCNC: 4.9 G/DL
ALP BLD-CCNC: 97 U/L
ALT SERPL-CCNC: 15 U/L
ANION GAP SERPL CALC-SCNC: 18 MMOL/L
AST SERPL-CCNC: 18 U/L
BILIRUB SERPL-MCNC: 0.3 MG/DL
BUN SERPL-MCNC: 16 MG/DL
CALCIUM SERPL-MCNC: 10.1 MG/DL
CHLORIDE SERPL-SCNC: 103 MMOL/L
CO2 SERPL-SCNC: 22 MMOL/L
CREAT SERPL-MCNC: 0.75 MG/DL
EGFR: 95 ML/MIN/1.73M2
GLUCOSE SERPL-MCNC: 135 MG/DL
MAGNESIUM SERPL-MCNC: 2.3 MG/DL
POTASSIUM SERPL-SCNC: 4.5 MMOL/L
PROT SERPL-MCNC: 8 G/DL
SODIUM SERPL-SCNC: 143 MMOL/L

## 2024-10-23 ENCOUNTER — RESULT REVIEW (OUTPATIENT)
Age: 54
End: 2024-10-23

## 2024-10-23 ENCOUNTER — APPOINTMENT (OUTPATIENT)
Dept: INFUSION THERAPY | Facility: HOSPITAL | Age: 54
End: 2024-10-23

## 2024-10-23 ENCOUNTER — APPOINTMENT (OUTPATIENT)
Dept: HEMATOLOGY ONCOLOGY | Facility: CLINIC | Age: 54
End: 2024-10-23
Payer: COMMERCIAL

## 2024-10-23 VITALS
WEIGHT: 153.22 LBS | HEART RATE: 70 BPM | BODY MASS INDEX: 28.95 KG/M2 | SYSTOLIC BLOOD PRESSURE: 116 MMHG | DIASTOLIC BLOOD PRESSURE: 76 MMHG | RESPIRATION RATE: 16 BRPM | OXYGEN SATURATION: 99 % | TEMPERATURE: 97.3 F

## 2024-10-23 DIAGNOSIS — C53.9 MALIGNANT NEOPLASM OF CERVIX UTERI, UNSPECIFIED: ICD-10-CM

## 2024-10-23 LAB
ALBUMIN SERPL ELPH-MCNC: 4.1 G/DL
ALP BLD-CCNC: 84 U/L
ALT SERPL-CCNC: 14 U/L
ANION GAP SERPL CALC-SCNC: 11 MMOL/L
AST SERPL-CCNC: 25 U/L
BASOPHILS # BLD AUTO: 0.01 K/UL — SIGNIFICANT CHANGE UP (ref 0–0.2)
BASOPHILS NFR BLD AUTO: 0.4 % — SIGNIFICANT CHANGE UP (ref 0–2)
BILIRUB SERPL-MCNC: 0.3 MG/DL
BUN SERPL-MCNC: 11 MG/DL
CALCIUM SERPL-MCNC: 9.6 MG/DL
CHLORIDE SERPL-SCNC: 101 MMOL/L
CO2 SERPL-SCNC: 24 MMOL/L
CREAT SERPL-MCNC: 0.79 MG/DL
EGFR: 89 ML/MIN/1.73M2
EOSINOPHIL # BLD AUTO: 0.18 K/UL — SIGNIFICANT CHANGE UP (ref 0–0.5)
EOSINOPHIL NFR BLD AUTO: 6.9 % — HIGH (ref 0–6)
GLUCOSE SERPL-MCNC: 86 MG/DL
HCT VFR BLD CALC: 27.7 % — LOW (ref 34.5–45)
HGB BLD-MCNC: 9.4 G/DL — LOW (ref 11.5–15.5)
IMM GRANULOCYTES NFR BLD AUTO: 0.8 % — SIGNIFICANT CHANGE UP (ref 0–0.9)
LYMPHOCYTES # BLD AUTO: 0.62 K/UL — LOW (ref 1–3.3)
LYMPHOCYTES # BLD AUTO: 23.7 % — SIGNIFICANT CHANGE UP (ref 13–44)
MAGNESIUM SERPL-MCNC: 1.9 MG/DL
MCHC RBC-ENTMCNC: 32.3 PG — SIGNIFICANT CHANGE UP (ref 27–34)
MCHC RBC-ENTMCNC: 33.9 G/DL — SIGNIFICANT CHANGE UP (ref 32–36)
MCV RBC AUTO: 95.2 FL — SIGNIFICANT CHANGE UP (ref 80–100)
MONOCYTES # BLD AUTO: 0.38 K/UL — SIGNIFICANT CHANGE UP (ref 0–0.9)
MONOCYTES NFR BLD AUTO: 14.5 % — HIGH (ref 2–14)
NEUTROPHILS # BLD AUTO: 1.41 K/UL — LOW (ref 1.8–7.4)
NEUTROPHILS NFR BLD AUTO: 53.7 % — SIGNIFICANT CHANGE UP (ref 43–77)
NRBC # BLD: 0 /100 WBCS — SIGNIFICANT CHANGE UP (ref 0–0)
PLATELET # BLD AUTO: 160 K/UL — SIGNIFICANT CHANGE UP (ref 150–400)
POTASSIUM SERPL-SCNC: 4.1 MMOL/L
PROT SERPL-MCNC: 6.8 G/DL
RBC # BLD: 2.91 M/UL — LOW (ref 3.8–5.2)
RBC # FLD: 19.2 % — HIGH (ref 10.3–14.5)
SODIUM SERPL-SCNC: 136 MMOL/L
WBC # BLD: 2.62 K/UL — LOW (ref 3.8–10.5)
WBC # FLD AUTO: 2.62 K/UL — LOW (ref 3.8–10.5)

## 2024-10-23 PROCEDURE — 99214 OFFICE O/P EST MOD 30 MIN: CPT

## 2024-11-04 ENCOUNTER — OUTPATIENT (OUTPATIENT)
Dept: OUTPATIENT SERVICES | Facility: HOSPITAL | Age: 54
LOS: 1 days | Discharge: HOME | End: 2024-11-04

## 2024-11-04 DIAGNOSIS — C53.9 MALIGNANT NEOPLASM OF CERVIX UTERI, UNSPECIFIED: ICD-10-CM

## 2024-11-06 ENCOUNTER — APPOINTMENT (OUTPATIENT)
Dept: HEMATOLOGY ONCOLOGY | Facility: CLINIC | Age: 54
End: 2024-11-06

## 2024-11-06 ENCOUNTER — RESULT REVIEW (OUTPATIENT)
Age: 54
End: 2024-11-06

## 2024-11-06 ENCOUNTER — APPOINTMENT (OUTPATIENT)
Dept: INFUSION THERAPY | Facility: HOSPITAL | Age: 54
End: 2024-11-06

## 2024-11-06 LAB
BASOPHILS # BLD AUTO: 0.01 K/UL — SIGNIFICANT CHANGE UP (ref 0–0.2)
BASOPHILS NFR BLD AUTO: 0.4 % — SIGNIFICANT CHANGE UP (ref 0–2)
EOSINOPHIL # BLD AUTO: 0.01 K/UL — SIGNIFICANT CHANGE UP (ref 0–0.5)
EOSINOPHIL NFR BLD AUTO: 0.4 % — SIGNIFICANT CHANGE UP (ref 0–6)
HCT VFR BLD CALC: 29.5 % — LOW (ref 34.5–45)
HGB BLD-MCNC: 10.4 G/DL — LOW (ref 11.5–15.5)
IMM GRANULOCYTES NFR BLD AUTO: 1.8 % — HIGH (ref 0–0.9)
LYMPHOCYTES # BLD AUTO: 0.49 K/UL — LOW (ref 1–3.3)
LYMPHOCYTES # BLD AUTO: 17.2 % — SIGNIFICANT CHANGE UP (ref 13–44)
MCHC RBC-ENTMCNC: 34 PG — SIGNIFICANT CHANGE UP (ref 27–34)
MCHC RBC-ENTMCNC: 35.3 G/DL — SIGNIFICANT CHANGE UP (ref 32–36)
MCV RBC AUTO: 96.4 FL — SIGNIFICANT CHANGE UP (ref 80–100)
MONOCYTES # BLD AUTO: 0.09 K/UL — SIGNIFICANT CHANGE UP (ref 0–0.9)
MONOCYTES NFR BLD AUTO: 3.2 % — SIGNIFICANT CHANGE UP (ref 2–14)
NEUTROPHILS # BLD AUTO: 2.2 K/UL — SIGNIFICANT CHANGE UP (ref 1.8–7.4)
NEUTROPHILS NFR BLD AUTO: 77 % — SIGNIFICANT CHANGE UP (ref 43–77)
NRBC # BLD: 0 /100 WBCS — SIGNIFICANT CHANGE UP (ref 0–0)
NRBC BLD-RTO: 0 /100 WBCS — SIGNIFICANT CHANGE UP (ref 0–0)
PLATELET # BLD AUTO: 99 K/UL — LOW (ref 150–400)
RBC # BLD: 3.06 M/UL — LOW (ref 3.8–5.2)
RBC # FLD: 17.3 % — HIGH (ref 10.3–14.5)
WBC # BLD: 2.85 K/UL — LOW (ref 3.8–10.5)
WBC # FLD AUTO: 2.85 K/UL — LOW (ref 3.8–10.5)

## 2024-11-07 DIAGNOSIS — Z51.11 ENCOUNTER FOR ANTINEOPLASTIC CHEMOTHERAPY: ICD-10-CM

## 2024-11-07 DIAGNOSIS — R11.2 NAUSEA WITH VOMITING, UNSPECIFIED: ICD-10-CM

## 2024-11-13 ENCOUNTER — RESULT REVIEW (OUTPATIENT)
Age: 54
End: 2024-11-13

## 2024-11-13 ENCOUNTER — APPOINTMENT (OUTPATIENT)
Dept: HEMATOLOGY ONCOLOGY | Facility: CLINIC | Age: 54
End: 2024-11-13
Payer: COMMERCIAL

## 2024-11-13 VITALS
RESPIRATION RATE: 16 BRPM | BODY MASS INDEX: 27.91 KG/M2 | HEART RATE: 69 BPM | OXYGEN SATURATION: 100 % | DIASTOLIC BLOOD PRESSURE: 70 MMHG | WEIGHT: 147.71 LBS | TEMPERATURE: 98.4 F | SYSTOLIC BLOOD PRESSURE: 102 MMHG

## 2024-11-13 DIAGNOSIS — C53.9 MALIGNANT NEOPLASM OF CERVIX UTERI, UNSPECIFIED: ICD-10-CM

## 2024-11-13 LAB
ALBUMIN SERPL ELPH-MCNC: 4.3 G/DL
ALP BLD-CCNC: 97 U/L
ALT SERPL-CCNC: 12 U/L
ANION GAP SERPL CALC-SCNC: 10 MMOL/L
AST SERPL-CCNC: 18 U/L
BASOPHILS # BLD AUTO: 0.01 K/UL — SIGNIFICANT CHANGE UP (ref 0–0.2)
BASOPHILS NFR BLD AUTO: 0.4 % — SIGNIFICANT CHANGE UP (ref 0–2)
BILIRUB SERPL-MCNC: 0.4 MG/DL
BUN SERPL-MCNC: 15 MG/DL
CALCIUM SERPL-MCNC: 9.9 MG/DL
CHLORIDE SERPL-SCNC: 103 MMOL/L
CO2 SERPL-SCNC: 27 MMOL/L
CREAT SERPL-MCNC: 0.79 MG/DL
EGFR: 89 ML/MIN/1.73M2
EOSINOPHIL # BLD AUTO: 0.22 K/UL — SIGNIFICANT CHANGE UP (ref 0–0.5)
EOSINOPHIL NFR BLD AUTO: 8.4 % — HIGH (ref 0–6)
GLUCOSE SERPL-MCNC: 73 MG/DL
HCT VFR BLD CALC: 28.6 % — LOW (ref 34.5–45)
HGB BLD-MCNC: 9.9 G/DL — LOW (ref 11.5–15.5)
IMM GRANULOCYTES NFR BLD AUTO: 0.8 % — SIGNIFICANT CHANGE UP (ref 0–0.9)
LYMPHOCYTES # BLD AUTO: 0.56 K/UL — LOW (ref 1–3.3)
LYMPHOCYTES # BLD AUTO: 21.5 % — SIGNIFICANT CHANGE UP (ref 13–44)
MAGNESIUM SERPL-MCNC: 1.8 MG/DL
MCHC RBC-ENTMCNC: 34.4 PG — HIGH (ref 27–34)
MCHC RBC-ENTMCNC: 34.6 G/DL — SIGNIFICANT CHANGE UP (ref 32–36)
MCV RBC AUTO: 99.3 FL — SIGNIFICANT CHANGE UP (ref 80–100)
MONOCYTES # BLD AUTO: 0.3 K/UL — SIGNIFICANT CHANGE UP (ref 0–0.9)
MONOCYTES NFR BLD AUTO: 11.5 % — SIGNIFICANT CHANGE UP (ref 2–14)
NEUTROPHILS # BLD AUTO: 1.5 K/UL — LOW (ref 1.8–7.4)
NEUTROPHILS NFR BLD AUTO: 57.4 % — SIGNIFICANT CHANGE UP (ref 43–77)
NRBC # BLD: 0 /100 WBCS — SIGNIFICANT CHANGE UP (ref 0–0)
NRBC BLD-RTO: 0 /100 WBCS — SIGNIFICANT CHANGE UP (ref 0–0)
PLATELET # BLD AUTO: 91 K/UL — LOW (ref 150–400)
POTASSIUM SERPL-SCNC: 4.6 MMOL/L
PROT SERPL-MCNC: 7.1 G/DL
RBC # BLD: 2.88 M/UL — LOW (ref 3.8–5.2)
RBC # FLD: 15.5 % — HIGH (ref 10.3–14.5)
SODIUM SERPL-SCNC: 140 MMOL/L
WBC # BLD: 2.61 K/UL — LOW (ref 3.8–10.5)
WBC # FLD AUTO: 2.61 K/UL — LOW (ref 3.8–10.5)

## 2024-11-13 PROCEDURE — 99213 OFFICE O/P EST LOW 20 MIN: CPT

## 2024-11-13 PROCEDURE — G2211 COMPLEX E/M VISIT ADD ON: CPT

## 2024-11-19 ENCOUNTER — RESULT REVIEW (OUTPATIENT)
Age: 54
End: 2024-11-19

## 2024-11-21 ENCOUNTER — OUTPATIENT (OUTPATIENT)
Dept: OUTPATIENT SERVICES | Facility: HOSPITAL | Age: 54
LOS: 1 days | End: 2024-11-21
Payer: COMMERCIAL

## 2024-11-21 ENCOUNTER — APPOINTMENT (OUTPATIENT)
Dept: CT IMAGING | Facility: CLINIC | Age: 54
End: 2024-11-21
Payer: COMMERCIAL

## 2024-11-21 DIAGNOSIS — C53.9 MALIGNANT NEOPLASM OF CERVIX UTERI, UNSPECIFIED: ICD-10-CM

## 2024-11-21 PROCEDURE — 74177 CT ABD & PELVIS W/CONTRAST: CPT

## 2024-11-21 PROCEDURE — 71260 CT THORAX DX C+: CPT | Mod: 26

## 2024-11-21 PROCEDURE — 71260 CT THORAX DX C+: CPT

## 2024-11-21 PROCEDURE — 74177 CT ABD & PELVIS W/CONTRAST: CPT | Mod: 26

## 2024-12-05 ENCOUNTER — APPOINTMENT (OUTPATIENT)
Dept: SURGICAL ONCOLOGY | Facility: CLINIC | Age: 54
End: 2024-12-05

## 2024-12-05 DIAGNOSIS — C50.911 MALIGNANT NEOPLASM OF UNSPECIFIED SITE OF RIGHT FEMALE BREAST: ICD-10-CM

## 2024-12-27 ENCOUNTER — RESULT REVIEW (OUTPATIENT)
Age: 54
End: 2024-12-27

## 2024-12-27 ENCOUNTER — APPOINTMENT (OUTPATIENT)
Dept: HEMATOLOGY ONCOLOGY | Facility: CLINIC | Age: 54
End: 2024-12-27
Payer: COMMERCIAL

## 2024-12-27 VITALS
SYSTOLIC BLOOD PRESSURE: 116 MMHG | OXYGEN SATURATION: 98 % | WEIGHT: 147.93 LBS | HEIGHT: 61.02 IN | HEART RATE: 78 BPM | RESPIRATION RATE: 16 BRPM | DIASTOLIC BLOOD PRESSURE: 78 MMHG | BODY MASS INDEX: 27.93 KG/M2 | TEMPERATURE: 97.3 F

## 2024-12-27 DIAGNOSIS — C53.9 MALIGNANT NEOPLASM OF CERVIX UTERI, UNSPECIFIED: ICD-10-CM

## 2024-12-27 LAB
BASOPHILS # BLD AUTO: 0.02 K/UL — SIGNIFICANT CHANGE UP (ref 0–0.2)
BASOPHILS NFR BLD AUTO: 0.5 % — SIGNIFICANT CHANGE UP (ref 0–2)
EOSINOPHIL # BLD AUTO: 0.69 K/UL — HIGH (ref 0–0.5)
EOSINOPHIL NFR BLD AUTO: 18.1 % — HIGH (ref 0–6)
HCT VFR BLD CALC: 32.6 % — LOW (ref 34.5–45)
HGB BLD-MCNC: 10.9 G/DL — LOW (ref 11.5–15.5)
IMM GRANULOCYTES NFR BLD AUTO: 0.3 % — SIGNIFICANT CHANGE UP (ref 0–0.9)
LYMPHOCYTES # BLD AUTO: 0.8 K/UL — LOW (ref 1–3.3)
LYMPHOCYTES # BLD AUTO: 20.9 % — SIGNIFICANT CHANGE UP (ref 13–44)
MCHC RBC-ENTMCNC: 33.4 G/DL — SIGNIFICANT CHANGE UP (ref 32–36)
MCHC RBC-ENTMCNC: 33.9 PG — SIGNIFICANT CHANGE UP (ref 27–34)
MCV RBC AUTO: 101.2 FL — HIGH (ref 80–100)
MONOCYTES # BLD AUTO: 0.4 K/UL — SIGNIFICANT CHANGE UP (ref 0–0.9)
MONOCYTES NFR BLD AUTO: 10.5 % — SIGNIFICANT CHANGE UP (ref 2–14)
NEUTROPHILS # BLD AUTO: 1.9 K/UL — SIGNIFICANT CHANGE UP (ref 1.8–7.4)
NEUTROPHILS NFR BLD AUTO: 49.7 % — SIGNIFICANT CHANGE UP (ref 43–77)
NRBC # BLD: 0 /100 WBCS — SIGNIFICANT CHANGE UP (ref 0–0)
NRBC BLD-RTO: 0 /100 WBCS — SIGNIFICANT CHANGE UP (ref 0–0)
PLATELET # BLD AUTO: 201 K/UL — SIGNIFICANT CHANGE UP (ref 150–400)
RBC # BLD: 3.22 M/UL — LOW (ref 3.8–5.2)
RBC # FLD: 13.1 % — SIGNIFICANT CHANGE UP (ref 10.3–14.5)
WBC # BLD: 3.82 K/UL — SIGNIFICANT CHANGE UP (ref 3.8–10.5)
WBC # FLD AUTO: 3.82 K/UL — SIGNIFICANT CHANGE UP (ref 3.8–10.5)

## 2024-12-27 PROCEDURE — 99213 OFFICE O/P EST LOW 20 MIN: CPT

## 2025-01-22 ENCOUNTER — APPOINTMENT (OUTPATIENT)
Dept: GYNECOLOGIC ONCOLOGY | Facility: CLINIC | Age: 55
End: 2025-01-22
Payer: COMMERCIAL

## 2025-01-22 VITALS
HEIGHT: 61 IN | TEMPERATURE: 97.6 F | HEART RATE: 74 BPM | OXYGEN SATURATION: 98 % | SYSTOLIC BLOOD PRESSURE: 118 MMHG | WEIGHT: 150.2 LBS | DIASTOLIC BLOOD PRESSURE: 84 MMHG | BODY MASS INDEX: 28.36 KG/M2

## 2025-01-22 DIAGNOSIS — C53.9 MALIGNANT NEOPLASM OF CERVIX UTERI, UNSPECIFIED: ICD-10-CM

## 2025-01-22 PROCEDURE — 99214 OFFICE O/P EST MOD 30 MIN: CPT

## 2025-01-22 PROCEDURE — 99459 PELVIC EXAMINATION: CPT

## 2025-01-25 ENCOUNTER — OUTPATIENT (OUTPATIENT)
Dept: OUTPATIENT SERVICES | Facility: HOSPITAL | Age: 55
LOS: 1 days | Discharge: HOME | End: 2025-01-25

## 2025-01-25 DIAGNOSIS — C53.9 MALIGNANT NEOPLASM OF CERVIX UTERI, UNSPECIFIED: ICD-10-CM

## 2025-01-28 ENCOUNTER — APPOINTMENT (OUTPATIENT)
Dept: INFUSION THERAPY | Facility: HOSPITAL | Age: 55
End: 2025-01-28

## 2025-02-24 ENCOUNTER — APPOINTMENT (OUTPATIENT)
Dept: SURGICAL ONCOLOGY | Facility: CLINIC | Age: 55
End: 2025-02-24
Payer: COMMERCIAL

## 2025-02-24 VITALS
HEIGHT: 61 IN | SYSTOLIC BLOOD PRESSURE: 125 MMHG | OXYGEN SATURATION: 97 % | WEIGHT: 150 LBS | DIASTOLIC BLOOD PRESSURE: 76 MMHG | HEART RATE: 81 BPM | BODY MASS INDEX: 28.32 KG/M2 | RESPIRATION RATE: 17 BRPM

## 2025-02-24 DIAGNOSIS — C50.911 MALIGNANT NEOPLASM OF UNSPECIFIED SITE OF RIGHT FEMALE BREAST: ICD-10-CM

## 2025-02-24 PROCEDURE — 99214 OFFICE O/P EST MOD 30 MIN: CPT

## 2025-02-26 ENCOUNTER — APPOINTMENT (OUTPATIENT)
Dept: CT IMAGING | Facility: CLINIC | Age: 55
End: 2025-02-26
Payer: COMMERCIAL

## 2025-02-26 ENCOUNTER — OUTPATIENT (OUTPATIENT)
Dept: OUTPATIENT SERVICES | Facility: HOSPITAL | Age: 55
LOS: 1 days | End: 2025-02-26
Payer: COMMERCIAL

## 2025-02-26 DIAGNOSIS — C53.9 MALIGNANT NEOPLASM OF CERVIX UTERI, UNSPECIFIED: ICD-10-CM

## 2025-02-26 PROCEDURE — 74177 CT ABD & PELVIS W/CONTRAST: CPT | Mod: 26

## 2025-02-26 PROCEDURE — 71260 CT THORAX DX C+: CPT | Mod: 26

## 2025-02-26 PROCEDURE — 74177 CT ABD & PELVIS W/CONTRAST: CPT

## 2025-02-26 PROCEDURE — 71260 CT THORAX DX C+: CPT

## 2025-03-04 ENCOUNTER — APPOINTMENT (OUTPATIENT)
Dept: HEMATOLOGY ONCOLOGY | Facility: CLINIC | Age: 55
End: 2025-03-04

## 2025-03-11 ENCOUNTER — APPOINTMENT (OUTPATIENT)
Dept: HEMATOLOGY ONCOLOGY | Facility: CLINIC | Age: 55
End: 2025-03-11
Payer: COMMERCIAL

## 2025-03-11 ENCOUNTER — NON-APPOINTMENT (OUTPATIENT)
Age: 55
End: 2025-03-11

## 2025-03-11 VITALS
DIASTOLIC BLOOD PRESSURE: 69 MMHG | HEART RATE: 82 BPM | RESPIRATION RATE: 16 BRPM | BODY MASS INDEX: 28.12 KG/M2 | TEMPERATURE: 98.6 F | OXYGEN SATURATION: 97 % | SYSTOLIC BLOOD PRESSURE: 103 MMHG | WEIGHT: 148.81 LBS

## 2025-03-11 DIAGNOSIS — C53.9 MALIGNANT NEOPLASM OF CERVIX UTERI, UNSPECIFIED: ICD-10-CM

## 2025-03-11 DIAGNOSIS — C50.911 MALIGNANT NEOPLASM OF UNSPECIFIED SITE OF RIGHT FEMALE BREAST: ICD-10-CM

## 2025-03-11 DIAGNOSIS — T45.1X5A PAIN IN UNSPECIFIED JOINT: ICD-10-CM

## 2025-03-11 DIAGNOSIS — M25.50 PAIN IN UNSPECIFIED JOINT: ICD-10-CM

## 2025-03-11 PROCEDURE — G2211 COMPLEX E/M VISIT ADD ON: CPT

## 2025-03-11 PROCEDURE — 99214 OFFICE O/P EST MOD 30 MIN: CPT

## 2025-03-18 ENCOUNTER — APPOINTMENT (OUTPATIENT)
Dept: NUCLEAR MEDICINE | Facility: CLINIC | Age: 55
End: 2025-03-18

## 2025-03-18 ENCOUNTER — OUTPATIENT (OUTPATIENT)
Dept: OUTPATIENT SERVICES | Facility: HOSPITAL | Age: 55
LOS: 1 days | End: 2025-03-18
Payer: COMMERCIAL

## 2025-03-18 DIAGNOSIS — C53.9 MALIGNANT NEOPLASM OF CERVIX UTERI, UNSPECIFIED: ICD-10-CM

## 2025-03-18 DIAGNOSIS — C50.911 MALIGNANT NEOPLASM OF UNSPECIFIED SITE OF RIGHT FEMALE BREAST: ICD-10-CM

## 2025-03-18 PROCEDURE — 78815 PET IMAGE W/CT SKULL-THIGH: CPT

## 2025-03-18 PROCEDURE — 78815 PET IMAGE W/CT SKULL-THIGH: CPT | Mod: 26,PS

## 2025-03-18 PROCEDURE — A9552: CPT

## 2025-04-02 ENCOUNTER — OUTPATIENT (OUTPATIENT)
Dept: OUTPATIENT SERVICES | Facility: HOSPITAL | Age: 55
LOS: 1 days | Discharge: HOME | End: 2025-04-02

## 2025-04-02 DIAGNOSIS — C53.9 MALIGNANT NEOPLASM OF CERVIX UTERI, UNSPECIFIED: ICD-10-CM

## 2025-04-03 ENCOUNTER — APPOINTMENT (OUTPATIENT)
Dept: HEMATOLOGY ONCOLOGY | Facility: CLINIC | Age: 55
End: 2025-04-03
Payer: COMMERCIAL

## 2025-04-03 VITALS
RESPIRATION RATE: 17 BRPM | OXYGEN SATURATION: 98 % | TEMPERATURE: 97.2 F | SYSTOLIC BLOOD PRESSURE: 126 MMHG | WEIGHT: 147.71 LBS | BODY MASS INDEX: 27.91 KG/M2 | DIASTOLIC BLOOD PRESSURE: 79 MMHG | HEART RATE: 90 BPM

## 2025-04-03 PROCEDURE — 99214 OFFICE O/P EST MOD 30 MIN: CPT

## 2025-04-08 ENCOUNTER — APPOINTMENT (OUTPATIENT)
Dept: HEMATOLOGY ONCOLOGY | Facility: CLINIC | Age: 55
End: 2025-04-08

## 2025-04-09 ENCOUNTER — RESULT REVIEW (OUTPATIENT)
Age: 55
End: 2025-04-09

## 2025-04-09 ENCOUNTER — APPOINTMENT (OUTPATIENT)
Dept: INFUSION THERAPY | Facility: HOSPITAL | Age: 55
End: 2025-04-09

## 2025-04-09 ENCOUNTER — APPOINTMENT (OUTPATIENT)
Dept: HEMATOLOGY ONCOLOGY | Facility: CLINIC | Age: 55
End: 2025-04-09
Payer: COMMERCIAL

## 2025-04-09 DIAGNOSIS — C53.9 MALIGNANT NEOPLASM OF CERVIX UTERI, UNSPECIFIED: ICD-10-CM

## 2025-04-09 LAB
ALBUMIN SERPL ELPH-MCNC: 4.5 G/DL — SIGNIFICANT CHANGE UP (ref 3.3–5)
ALP SERPL-CCNC: 92 U/L — SIGNIFICANT CHANGE UP (ref 40–120)
ALT FLD-CCNC: 13 U/L — SIGNIFICANT CHANGE UP (ref 10–45)
ANION GAP SERPL CALC-SCNC: 9 MMOL/L — SIGNIFICANT CHANGE UP (ref 5–17)
AST SERPL-CCNC: 25 U/L — SIGNIFICANT CHANGE UP (ref 10–40)
BASOPHILS # BLD AUTO: 0.03 K/UL — SIGNIFICANT CHANGE UP (ref 0–0.2)
BASOPHILS NFR BLD AUTO: 0.8 % — SIGNIFICANT CHANGE UP (ref 0–2)
BILIRUB SERPL-MCNC: 0.2 MG/DL — SIGNIFICANT CHANGE UP (ref 0.2–1.2)
BUN SERPL-MCNC: 15 MG/DL — SIGNIFICANT CHANGE UP (ref 7–23)
CALCIUM SERPL-MCNC: 10 MG/DL — SIGNIFICANT CHANGE UP (ref 8.4–10.5)
CHLORIDE SERPL-SCNC: 103 MMOL/L — SIGNIFICANT CHANGE UP (ref 96–108)
CO2 SERPL-SCNC: 28 MMOL/L — SIGNIFICANT CHANGE UP (ref 22–31)
CREAT SERPL-MCNC: 0.76 MG/DL — SIGNIFICANT CHANGE UP (ref 0.5–1.3)
EGFR: 92 ML/MIN/1.73M2 — SIGNIFICANT CHANGE UP
EGFR: 92 ML/MIN/1.73M2 — SIGNIFICANT CHANGE UP
EOSINOPHIL # BLD AUTO: 0.26 K/UL — SIGNIFICANT CHANGE UP (ref 0–0.5)
EOSINOPHIL NFR BLD AUTO: 6.6 % — HIGH (ref 0–6)
GLUCOSE SERPL-MCNC: 86 MG/DL — SIGNIFICANT CHANGE UP (ref 70–99)
HCT VFR BLD CALC: 32.8 % — LOW (ref 34.5–45)
HGB BLD-MCNC: 10.8 G/DL — LOW (ref 11.5–15.5)
IMM GRANULOCYTES NFR BLD AUTO: 0.3 % — SIGNIFICANT CHANGE UP (ref 0–0.9)
LYMPHOCYTES # BLD AUTO: 1.18 K/UL — SIGNIFICANT CHANGE UP (ref 1–3.3)
LYMPHOCYTES # BLD AUTO: 30 % — SIGNIFICANT CHANGE UP (ref 13–44)
MAGNESIUM SERPL-MCNC: 2.4 MG/DL — SIGNIFICANT CHANGE UP (ref 1.6–2.6)
MCHC RBC-ENTMCNC: 28.2 PG — SIGNIFICANT CHANGE UP (ref 27–34)
MCHC RBC-ENTMCNC: 32.9 G/DL — SIGNIFICANT CHANGE UP (ref 32–36)
MCV RBC AUTO: 85.6 FL — SIGNIFICANT CHANGE UP (ref 80–100)
MONOCYTES # BLD AUTO: 0.39 K/UL — SIGNIFICANT CHANGE UP (ref 0–0.9)
MONOCYTES NFR BLD AUTO: 9.9 % — SIGNIFICANT CHANGE UP (ref 2–14)
NEUTROPHILS # BLD AUTO: 2.06 K/UL — SIGNIFICANT CHANGE UP (ref 1.8–7.4)
NEUTROPHILS NFR BLD AUTO: 52.4 % — SIGNIFICANT CHANGE UP (ref 43–77)
NRBC BLD AUTO-RTO: 0 /100 WBCS — SIGNIFICANT CHANGE UP (ref 0–0)
PLATELET # BLD AUTO: 197 K/UL — SIGNIFICANT CHANGE UP (ref 150–400)
POTASSIUM SERPL-MCNC: 4.1 MMOL/L — SIGNIFICANT CHANGE UP (ref 3.5–5.3)
POTASSIUM SERPL-SCNC: 4.1 MMOL/L — SIGNIFICANT CHANGE UP (ref 3.5–5.3)
PROT SERPL-MCNC: 7.8 G/DL — SIGNIFICANT CHANGE UP (ref 6–8.3)
RBC # BLD: 3.83 M/UL — SIGNIFICANT CHANGE UP (ref 3.8–5.2)
RBC # FLD: 14.4 % — SIGNIFICANT CHANGE UP (ref 10.3–14.5)
SODIUM SERPL-SCNC: 141 MMOL/L — SIGNIFICANT CHANGE UP (ref 135–145)
WBC # BLD: 3.93 K/UL — SIGNIFICANT CHANGE UP (ref 3.8–10.5)
WBC # FLD AUTO: 3.93 K/UL — SIGNIFICANT CHANGE UP (ref 3.8–10.5)

## 2025-04-09 PROCEDURE — 99213 OFFICE O/P EST LOW 20 MIN: CPT

## 2025-04-09 PROCEDURE — G2211 COMPLEX E/M VISIT ADD ON: CPT

## 2025-04-10 LAB
CREAT ?TM UR-MCNC: 40 MG/DL — SIGNIFICANT CHANGE UP
PROT ?TM UR-MCNC: 13 MG/DL — HIGH (ref 0–12)
PROT/CREAT UR-RTO: 0.3 RATIO — HIGH (ref 0–0.2)

## 2025-04-15 DIAGNOSIS — Z51.11 ENCOUNTER FOR ANTINEOPLASTIC CHEMOTHERAPY: ICD-10-CM

## 2025-04-30 ENCOUNTER — APPOINTMENT (OUTPATIENT)
Dept: HEMATOLOGY ONCOLOGY | Facility: CLINIC | Age: 55
End: 2025-04-30
Payer: COMMERCIAL

## 2025-04-30 ENCOUNTER — RESULT REVIEW (OUTPATIENT)
Age: 55
End: 2025-04-30

## 2025-04-30 ENCOUNTER — APPOINTMENT (OUTPATIENT)
Dept: GYNECOLOGIC ONCOLOGY | Facility: CLINIC | Age: 55
End: 2025-04-30
Payer: COMMERCIAL

## 2025-04-30 ENCOUNTER — APPOINTMENT (OUTPATIENT)
Dept: INFUSION THERAPY | Facility: HOSPITAL | Age: 55
End: 2025-04-30

## 2025-04-30 VITALS
WEIGHT: 149 LBS | TEMPERATURE: 97.7 F | BODY MASS INDEX: 28.13 KG/M2 | HEIGHT: 61 IN | SYSTOLIC BLOOD PRESSURE: 127 MMHG | DIASTOLIC BLOOD PRESSURE: 81 MMHG | OXYGEN SATURATION: 97 % | HEART RATE: 71 BPM

## 2025-04-30 DIAGNOSIS — C53.9 MALIGNANT NEOPLASM OF CERVIX UTERI, UNSPECIFIED: ICD-10-CM

## 2025-04-30 LAB
ALBUMIN SERPL ELPH-MCNC: 4.6 G/DL — SIGNIFICANT CHANGE UP (ref 3.3–5)
ALP SERPL-CCNC: 89 U/L — SIGNIFICANT CHANGE UP (ref 40–120)
ALT FLD-CCNC: 11 U/L — SIGNIFICANT CHANGE UP (ref 10–45)
ANION GAP SERPL CALC-SCNC: 11 MMOL/L — SIGNIFICANT CHANGE UP (ref 5–17)
AST SERPL-CCNC: 26 U/L — SIGNIFICANT CHANGE UP (ref 10–40)
BASOPHILS # BLD AUTO: 0.03 K/UL — SIGNIFICANT CHANGE UP (ref 0–0.2)
BASOPHILS NFR BLD AUTO: 0.6 % — SIGNIFICANT CHANGE UP (ref 0–2)
BILIRUB SERPL-MCNC: 0.2 MG/DL — SIGNIFICANT CHANGE UP (ref 0.2–1.2)
BUN SERPL-MCNC: 14 MG/DL — SIGNIFICANT CHANGE UP (ref 7–23)
CALCIUM SERPL-MCNC: 9.8 MG/DL — SIGNIFICANT CHANGE UP (ref 8.4–10.5)
CHLORIDE SERPL-SCNC: 102 MMOL/L — SIGNIFICANT CHANGE UP (ref 96–108)
CO2 SERPL-SCNC: 26 MMOL/L — SIGNIFICANT CHANGE UP (ref 22–31)
CREAT SERPL-MCNC: 0.84 MG/DL — SIGNIFICANT CHANGE UP (ref 0.5–1.3)
EGFR: 82 ML/MIN/1.73M2 — SIGNIFICANT CHANGE UP
EGFR: 82 ML/MIN/1.73M2 — SIGNIFICANT CHANGE UP
EOSINOPHIL # BLD AUTO: 0.63 K/UL — HIGH (ref 0–0.5)
EOSINOPHIL NFR BLD AUTO: 13.5 % — HIGH (ref 0–6)
GLUCOSE SERPL-MCNC: 92 MG/DL — SIGNIFICANT CHANGE UP (ref 70–99)
HCT VFR BLD CALC: 34.8 % — SIGNIFICANT CHANGE UP (ref 34.5–45)
HGB BLD-MCNC: 11.3 G/DL — LOW (ref 11.5–15.5)
IMM GRANULOCYTES NFR BLD AUTO: 0 % — SIGNIFICANT CHANGE UP (ref 0–0.9)
LYMPHOCYTES # BLD AUTO: 1.44 K/UL — SIGNIFICANT CHANGE UP (ref 1–3.3)
LYMPHOCYTES # BLD AUTO: 30.8 % — SIGNIFICANT CHANGE UP (ref 13–44)
MAGNESIUM SERPL-MCNC: 2.4 MG/DL — SIGNIFICANT CHANGE UP (ref 1.6–2.6)
MCHC RBC-ENTMCNC: 27.7 PG — SIGNIFICANT CHANGE UP (ref 27–34)
MCHC RBC-ENTMCNC: 32.5 G/DL — SIGNIFICANT CHANGE UP (ref 32–36)
MCV RBC AUTO: 85.3 FL — SIGNIFICANT CHANGE UP (ref 80–100)
MONOCYTES # BLD AUTO: 0.39 K/UL — SIGNIFICANT CHANGE UP (ref 0–0.9)
MONOCYTES NFR BLD AUTO: 8.4 % — SIGNIFICANT CHANGE UP (ref 2–14)
NEUTROPHILS # BLD AUTO: 2.18 K/UL — SIGNIFICANT CHANGE UP (ref 1.8–7.4)
NEUTROPHILS NFR BLD AUTO: 46.7 % — SIGNIFICANT CHANGE UP (ref 43–77)
NRBC BLD AUTO-RTO: 0 /100 WBCS — SIGNIFICANT CHANGE UP (ref 0–0)
PLATELET # BLD AUTO: 226 K/UL — SIGNIFICANT CHANGE UP (ref 150–400)
POTASSIUM SERPL-MCNC: 4.8 MMOL/L — SIGNIFICANT CHANGE UP (ref 3.5–5.3)
POTASSIUM SERPL-SCNC: 4.8 MMOL/L — SIGNIFICANT CHANGE UP (ref 3.5–5.3)
PROT SERPL-MCNC: 8.2 G/DL — SIGNIFICANT CHANGE UP (ref 6–8.3)
RBC # BLD: 4.08 M/UL — SIGNIFICANT CHANGE UP (ref 3.8–5.2)
RBC # FLD: 14.9 % — HIGH (ref 10.3–14.5)
SODIUM SERPL-SCNC: 139 MMOL/L — SIGNIFICANT CHANGE UP (ref 135–145)
WBC # BLD: 4.67 K/UL — SIGNIFICANT CHANGE UP (ref 3.8–10.5)
WBC # FLD AUTO: 4.67 K/UL — SIGNIFICANT CHANGE UP (ref 3.8–10.5)

## 2025-04-30 PROCEDURE — 99214 OFFICE O/P EST MOD 30 MIN: CPT

## 2025-04-30 PROCEDURE — 99459 PELVIC EXAMINATION: CPT

## 2025-05-01 LAB
CREAT ?TM UR-MCNC: 53 MG/DL — SIGNIFICANT CHANGE UP
PROT ?TM UR-MCNC: 6 MG/DL — SIGNIFICANT CHANGE UP (ref 0–12)
PROT/CREAT UR-RTO: 0.1 RATIO — SIGNIFICANT CHANGE UP (ref 0–0.2)

## 2025-05-21 ENCOUNTER — APPOINTMENT (OUTPATIENT)
Dept: INFUSION THERAPY | Facility: HOSPITAL | Age: 55
End: 2025-05-21

## 2025-05-21 ENCOUNTER — RESULT REVIEW (OUTPATIENT)
Age: 55
End: 2025-05-21

## 2025-05-21 ENCOUNTER — APPOINTMENT (OUTPATIENT)
Dept: HEMATOLOGY ONCOLOGY | Facility: CLINIC | Age: 55
End: 2025-05-21
Payer: COMMERCIAL

## 2025-05-21 DIAGNOSIS — C53.9 MALIGNANT NEOPLASM OF CERVIX UTERI, UNSPECIFIED: ICD-10-CM

## 2025-05-21 PROCEDURE — 99213 OFFICE O/P EST LOW 20 MIN: CPT

## 2025-05-21 PROCEDURE — G2211 COMPLEX E/M VISIT ADD ON: CPT

## 2025-05-22 LAB
ALBUMIN SERPL ELPH-MCNC: 4.2 G/DL — SIGNIFICANT CHANGE UP (ref 3.3–5)
ALP SERPL-CCNC: 86 U/L — SIGNIFICANT CHANGE UP (ref 40–120)
ALT FLD-CCNC: 15 U/L — SIGNIFICANT CHANGE UP (ref 10–40)
ANION GAP SERPL CALC-SCNC: 15 MMOL/L — SIGNIFICANT CHANGE UP (ref 5–17)
AST SERPL-CCNC: 31 U/L — SIGNIFICANT CHANGE UP (ref 10–35)
BASOPHILS # BLD AUTO: 0.03 K/UL — SIGNIFICANT CHANGE UP (ref 0–0.2)
BASOPHILS NFR BLD AUTO: 0.6 % — SIGNIFICANT CHANGE UP (ref 0–2)
BILIRUB SERPL-MCNC: <0.2 MG/DL — SIGNIFICANT CHANGE UP (ref 0.2–1.2)
BUN SERPL-MCNC: 15 MG/DL — SIGNIFICANT CHANGE UP (ref 7–23)
CALCIUM SERPL-MCNC: 9.9 MG/DL — SIGNIFICANT CHANGE UP (ref 8.4–10.5)
CHLORIDE SERPL-SCNC: 98 MMOL/L — SIGNIFICANT CHANGE UP (ref 96–108)
CO2 SERPL-SCNC: 21 MMOL/L — LOW (ref 22–31)
CREAT ?TM UR-MCNC: 29 MG/DL — SIGNIFICANT CHANGE UP
CREAT SERPL-MCNC: 0.77 MG/DL — SIGNIFICANT CHANGE UP (ref 0.5–1.3)
EGFR: 91 ML/MIN/1.73M2 — SIGNIFICANT CHANGE UP
EGFR: 91 ML/MIN/1.73M2 — SIGNIFICANT CHANGE UP
EOSINOPHIL # BLD AUTO: 0.46 K/UL — SIGNIFICANT CHANGE UP (ref 0–0.5)
EOSINOPHIL NFR BLD AUTO: 9.5 % — HIGH (ref 0–6)
GLUCOSE SERPL-MCNC: 83 MG/DL — SIGNIFICANT CHANGE UP (ref 70–99)
HCT VFR BLD CALC: 34.3 % — LOW (ref 34.5–45)
HGB BLD-MCNC: 11.4 G/DL — LOW (ref 11.5–15.5)
IMM GRANULOCYTES NFR BLD AUTO: 0 % — SIGNIFICANT CHANGE UP (ref 0–0.9)
LYMPHOCYTES # BLD AUTO: 1.2 K/UL — SIGNIFICANT CHANGE UP (ref 1–3.3)
LYMPHOCYTES # BLD AUTO: 24.7 % — SIGNIFICANT CHANGE UP (ref 13–44)
MAGNESIUM SERPL-MCNC: 2.2 MG/DL — SIGNIFICANT CHANGE UP (ref 1.6–2.6)
MCHC RBC-ENTMCNC: 27.9 PG — SIGNIFICANT CHANGE UP (ref 27–34)
MCHC RBC-ENTMCNC: 33.2 G/DL — SIGNIFICANT CHANGE UP (ref 32–36)
MCV RBC AUTO: 83.9 FL — SIGNIFICANT CHANGE UP (ref 80–100)
MONOCYTES # BLD AUTO: 0.42 K/UL — SIGNIFICANT CHANGE UP (ref 0–0.9)
MONOCYTES NFR BLD AUTO: 8.6 % — SIGNIFICANT CHANGE UP (ref 2–14)
NEUTROPHILS # BLD AUTO: 2.75 K/UL — SIGNIFICANT CHANGE UP (ref 1.8–7.4)
NEUTROPHILS NFR BLD AUTO: 56.6 % — SIGNIFICANT CHANGE UP (ref 43–77)
PLATELET # BLD AUTO: 229 K/UL — SIGNIFICANT CHANGE UP (ref 150–400)
POTASSIUM SERPL-MCNC: 4.5 MMOL/L — SIGNIFICANT CHANGE UP (ref 3.5–5.3)
POTASSIUM SERPL-SCNC: 4.5 MMOL/L — SIGNIFICANT CHANGE UP (ref 3.5–5.3)
PROT ?TM UR-MCNC: 7 MG/DL — SIGNIFICANT CHANGE UP (ref 0–12)
PROT SERPL-MCNC: 8.1 G/DL — SIGNIFICANT CHANGE UP (ref 6–8.3)
PROT/CREAT UR-RTO: 0.2 RATIO — SIGNIFICANT CHANGE UP (ref 0–0.2)
RBC # BLD: 4.09 M/UL — SIGNIFICANT CHANGE UP (ref 3.8–5.2)
RBC # FLD: 15.8 % — HIGH (ref 10.3–14.5)
SODIUM SERPL-SCNC: 134 MMOL/L — LOW (ref 135–145)
WBC # BLD: 4.86 K/UL — SIGNIFICANT CHANGE UP (ref 3.8–10.5)
WBC # FLD AUTO: 4.86 K/UL — SIGNIFICANT CHANGE UP (ref 3.8–10.5)

## 2025-05-23 ENCOUNTER — LABORATORY RESULT (OUTPATIENT)
Age: 55
End: 2025-05-23

## 2025-06-02 ENCOUNTER — RESULT REVIEW (OUTPATIENT)
Age: 55
End: 2025-06-02

## 2025-06-04 ENCOUNTER — APPOINTMENT (OUTPATIENT)
Dept: CT IMAGING | Facility: CLINIC | Age: 55
End: 2025-06-04
Payer: COMMERCIAL

## 2025-06-04 ENCOUNTER — OUTPATIENT (OUTPATIENT)
Dept: OUTPATIENT SERVICES | Facility: HOSPITAL | Age: 55
LOS: 1 days | End: 2025-06-04
Payer: COMMERCIAL

## 2025-06-04 DIAGNOSIS — C53.9 MALIGNANT NEOPLASM OF CERVIX UTERI, UNSPECIFIED: ICD-10-CM

## 2025-06-04 PROCEDURE — 74177 CT ABD & PELVIS W/CONTRAST: CPT | Mod: 26

## 2025-06-04 PROCEDURE — 71260 CT THORAX DX C+: CPT | Mod: 26

## 2025-06-04 PROCEDURE — 74177 CT ABD & PELVIS W/CONTRAST: CPT

## 2025-06-04 PROCEDURE — 71260 CT THORAX DX C+: CPT

## 2025-06-06 ENCOUNTER — OUTPATIENT (OUTPATIENT)
Dept: OUTPATIENT SERVICES | Facility: HOSPITAL | Age: 55
LOS: 1 days | Discharge: HOME | End: 2025-06-06

## 2025-06-06 DIAGNOSIS — C53.9 MALIGNANT NEOPLASM OF CERVIX UTERI, UNSPECIFIED: ICD-10-CM

## 2025-06-11 ENCOUNTER — RESULT REVIEW (OUTPATIENT)
Age: 55
End: 2025-06-11

## 2025-06-11 ENCOUNTER — APPOINTMENT (OUTPATIENT)
Dept: HEMATOLOGY ONCOLOGY | Facility: CLINIC | Age: 55
End: 2025-06-11
Payer: COMMERCIAL

## 2025-06-11 ENCOUNTER — APPOINTMENT (OUTPATIENT)
Dept: INFUSION THERAPY | Facility: HOSPITAL | Age: 55
End: 2025-06-11

## 2025-06-11 VITALS
SYSTOLIC BLOOD PRESSURE: 128 MMHG | TEMPERATURE: 97.2 F | DIASTOLIC BLOOD PRESSURE: 85 MMHG | BODY MASS INDEX: 27.91 KG/M2 | WEIGHT: 147.71 LBS | OXYGEN SATURATION: 98 % | HEART RATE: 72 BPM | RESPIRATION RATE: 16 BRPM

## 2025-06-11 DIAGNOSIS — Z51.11 ENCOUNTER FOR ANTINEOPLASTIC CHEMOTHERAPY: ICD-10-CM

## 2025-06-11 LAB
BASOPHILS # BLD AUTO: 0.03 K/UL — SIGNIFICANT CHANGE UP (ref 0–0.2)
BASOPHILS NFR BLD AUTO: 0.6 % — SIGNIFICANT CHANGE UP (ref 0–2)
EOSINOPHIL # BLD AUTO: 0.56 K/UL — HIGH (ref 0–0.5)
EOSINOPHIL NFR BLD AUTO: 11.1 % — HIGH (ref 0–6)
HCT VFR BLD CALC: 34.6 % — SIGNIFICANT CHANGE UP (ref 34.5–45)
HGB BLD-MCNC: 11.5 G/DL — SIGNIFICANT CHANGE UP (ref 11.5–15.5)
IMM GRANULOCYTES NFR BLD AUTO: 0.2 % — SIGNIFICANT CHANGE UP (ref 0–0.9)
LYMPHOCYTES # BLD AUTO: 1.2 K/UL — SIGNIFICANT CHANGE UP (ref 1–3.3)
LYMPHOCYTES # BLD AUTO: 23.8 % — SIGNIFICANT CHANGE UP (ref 13–44)
MCHC RBC-ENTMCNC: 28 PG — SIGNIFICANT CHANGE UP (ref 27–34)
MCHC RBC-ENTMCNC: 33.2 G/DL — SIGNIFICANT CHANGE UP (ref 32–36)
MCV RBC AUTO: 84.2 FL — SIGNIFICANT CHANGE UP (ref 80–100)
MONOCYTES # BLD AUTO: 0.38 K/UL — SIGNIFICANT CHANGE UP (ref 0–0.9)
MONOCYTES NFR BLD AUTO: 7.5 % — SIGNIFICANT CHANGE UP (ref 2–14)
NEUTROPHILS # BLD AUTO: 2.86 K/UL — SIGNIFICANT CHANGE UP (ref 1.8–7.4)
NEUTROPHILS NFR BLD AUTO: 56.8 % — SIGNIFICANT CHANGE UP (ref 43–77)
NRBC BLD AUTO-RTO: 0 /100 WBCS — SIGNIFICANT CHANGE UP (ref 0–0)
PLATELET # BLD AUTO: 193 K/UL — SIGNIFICANT CHANGE UP (ref 150–400)
RBC # BLD: 4.11 M/UL — SIGNIFICANT CHANGE UP (ref 3.8–5.2)
RBC # FLD: 15.7 % — HIGH (ref 10.3–14.5)
WBC # BLD: 5.04 K/UL — SIGNIFICANT CHANGE UP (ref 3.8–10.5)
WBC # FLD AUTO: 5.04 K/UL — SIGNIFICANT CHANGE UP (ref 3.8–10.5)

## 2025-06-11 PROCEDURE — 99214 OFFICE O/P EST MOD 30 MIN: CPT

## 2025-06-12 LAB
ALBUMIN SERPL ELPH-MCNC: 4.4 G/DL — SIGNIFICANT CHANGE UP (ref 3.3–5)
ALP SERPL-CCNC: 79 U/L — SIGNIFICANT CHANGE UP (ref 40–120)
ALT FLD-CCNC: 20 U/L — SIGNIFICANT CHANGE UP (ref 10–40)
ANION GAP SERPL CALC-SCNC: 16 MMOL/L — SIGNIFICANT CHANGE UP (ref 5–17)
AST SERPL-CCNC: 30 U/L — SIGNIFICANT CHANGE UP (ref 10–35)
BILIRUB SERPL-MCNC: 0.2 MG/DL — SIGNIFICANT CHANGE UP (ref 0.2–1.2)
BUN SERPL-MCNC: 16 MG/DL — SIGNIFICANT CHANGE UP (ref 7–23)
CALCIUM SERPL-MCNC: 9.7 MG/DL — SIGNIFICANT CHANGE UP (ref 8.4–10.5)
CHLORIDE SERPL-SCNC: 98 MMOL/L — SIGNIFICANT CHANGE UP (ref 96–108)
CO2 SERPL-SCNC: 21 MMOL/L — LOW (ref 22–31)
CREAT SERPL-MCNC: 0.83 MG/DL — SIGNIFICANT CHANGE UP (ref 0.5–1.3)
EGFR: 83 ML/MIN/1.73M2 — SIGNIFICANT CHANGE UP
EGFR: 83 ML/MIN/1.73M2 — SIGNIFICANT CHANGE UP
GLUCOSE SERPL-MCNC: 89 MG/DL — SIGNIFICANT CHANGE UP (ref 70–99)
MAGNESIUM SERPL-MCNC: 2.2 MG/DL — SIGNIFICANT CHANGE UP (ref 1.6–2.6)
POTASSIUM SERPL-MCNC: 4.1 MMOL/L — SIGNIFICANT CHANGE UP (ref 3.5–5.3)
POTASSIUM SERPL-SCNC: 4.1 MMOL/L — SIGNIFICANT CHANGE UP (ref 3.5–5.3)
PROT SERPL-MCNC: 7.9 G/DL — SIGNIFICANT CHANGE UP (ref 6–8.3)
SODIUM SERPL-SCNC: 136 MMOL/L — SIGNIFICANT CHANGE UP (ref 135–145)

## 2025-07-02 ENCOUNTER — RESULT REVIEW (OUTPATIENT)
Age: 55
End: 2025-07-02

## 2025-07-02 ENCOUNTER — APPOINTMENT (OUTPATIENT)
Dept: HEMATOLOGY ONCOLOGY | Facility: CLINIC | Age: 55
End: 2025-07-02
Payer: COMMERCIAL

## 2025-07-02 ENCOUNTER — APPOINTMENT (OUTPATIENT)
Dept: HEMATOLOGY ONCOLOGY | Facility: CLINIC | Age: 55
End: 2025-07-02

## 2025-07-02 ENCOUNTER — APPOINTMENT (OUTPATIENT)
Dept: INFUSION THERAPY | Facility: HOSPITAL | Age: 55
End: 2025-07-02

## 2025-07-02 VITALS
HEART RATE: 71 BPM | TEMPERATURE: 97.2 F | RESPIRATION RATE: 16 BRPM | DIASTOLIC BLOOD PRESSURE: 88 MMHG | WEIGHT: 144.4 LBS | BODY MASS INDEX: 27.28 KG/M2 | SYSTOLIC BLOOD PRESSURE: 152 MMHG | OXYGEN SATURATION: 98 %

## 2025-07-02 LAB
ALBUMIN SERPL ELPH-MCNC: 4.7 G/DL — SIGNIFICANT CHANGE UP (ref 3.3–5)
ALP SERPL-CCNC: 75 U/L — SIGNIFICANT CHANGE UP (ref 40–120)
ALT FLD-CCNC: 13 U/L — SIGNIFICANT CHANGE UP (ref 10–45)
ANION GAP SERPL CALC-SCNC: 13 MMOL/L — SIGNIFICANT CHANGE UP (ref 5–17)
AST SERPL-CCNC: 27 U/L — SIGNIFICANT CHANGE UP (ref 10–40)
BASOPHILS # BLD AUTO: 0.03 K/UL — SIGNIFICANT CHANGE UP (ref 0–0.2)
BASOPHILS NFR BLD AUTO: 0.6 % — SIGNIFICANT CHANGE UP (ref 0–2)
BILIRUB SERPL-MCNC: 0.2 MG/DL — SIGNIFICANT CHANGE UP (ref 0.2–1.2)
BUN SERPL-MCNC: 16 MG/DL — SIGNIFICANT CHANGE UP (ref 7–23)
CALCIUM SERPL-MCNC: 10.2 MG/DL — SIGNIFICANT CHANGE UP (ref 8.4–10.5)
CHLORIDE SERPL-SCNC: 96 MMOL/L — SIGNIFICANT CHANGE UP (ref 96–108)
CO2 SERPL-SCNC: 24 MMOL/L — SIGNIFICANT CHANGE UP (ref 22–31)
CREAT SERPL-MCNC: 0.74 MG/DL — SIGNIFICANT CHANGE UP (ref 0.5–1.3)
EGFR: 95 ML/MIN/1.73M2 — SIGNIFICANT CHANGE UP
EGFR: 95 ML/MIN/1.73M2 — SIGNIFICANT CHANGE UP
EOSINOPHIL # BLD AUTO: 0.21 K/UL — SIGNIFICANT CHANGE UP (ref 0–0.5)
EOSINOPHIL NFR BLD AUTO: 4.5 % — SIGNIFICANT CHANGE UP (ref 0–6)
GLUCOSE SERPL-MCNC: 96 MG/DL — SIGNIFICANT CHANGE UP (ref 70–99)
HCT VFR BLD CALC: 37.5 % — SIGNIFICANT CHANGE UP (ref 34.5–45)
HGB BLD-MCNC: 12.5 G/DL — SIGNIFICANT CHANGE UP (ref 11.5–15.5)
IMM GRANULOCYTES NFR BLD AUTO: 0.4 % — SIGNIFICANT CHANGE UP (ref 0–0.9)
LYMPHOCYTES # BLD AUTO: 1.12 K/UL — SIGNIFICANT CHANGE UP (ref 1–3.3)
LYMPHOCYTES # BLD AUTO: 24.1 % — SIGNIFICANT CHANGE UP (ref 13–44)
MAGNESIUM SERPL-MCNC: 2.4 MG/DL — SIGNIFICANT CHANGE UP (ref 1.6–2.6)
MCHC RBC-ENTMCNC: 28.1 PG — SIGNIFICANT CHANGE UP (ref 27–34)
MCHC RBC-ENTMCNC: 33.3 G/DL — SIGNIFICANT CHANGE UP (ref 32–36)
MCV RBC AUTO: 84.3 FL — SIGNIFICANT CHANGE UP (ref 80–100)
MONOCYTES # BLD AUTO: 0.48 K/UL — SIGNIFICANT CHANGE UP (ref 0–0.9)
MONOCYTES NFR BLD AUTO: 10.3 % — SIGNIFICANT CHANGE UP (ref 2–14)
NEUTROPHILS # BLD AUTO: 2.79 K/UL — SIGNIFICANT CHANGE UP (ref 1.8–7.4)
NEUTROPHILS NFR BLD AUTO: 60.1 % — SIGNIFICANT CHANGE UP (ref 43–77)
NRBC BLD AUTO-RTO: 0 /100 WBCS — SIGNIFICANT CHANGE UP (ref 0–0)
PLATELET # BLD AUTO: 203 K/UL — SIGNIFICANT CHANGE UP (ref 150–400)
POTASSIUM SERPL-MCNC: 4.5 MMOL/L — SIGNIFICANT CHANGE UP (ref 3.5–5.3)
POTASSIUM SERPL-SCNC: 4.5 MMOL/L — SIGNIFICANT CHANGE UP (ref 3.5–5.3)
PROT SERPL-MCNC: 8.5 G/DL — HIGH (ref 6–8.3)
RBC # BLD: 4.45 M/UL — SIGNIFICANT CHANGE UP (ref 3.8–5.2)
RBC # FLD: 15.5 % — HIGH (ref 10.3–14.5)
SODIUM SERPL-SCNC: 133 MMOL/L — LOW (ref 135–145)
WBC # BLD: 4.65 K/UL — SIGNIFICANT CHANGE UP (ref 3.8–10.5)
WBC # FLD AUTO: 4.65 K/UL — SIGNIFICANT CHANGE UP (ref 3.8–10.5)

## 2025-07-02 PROCEDURE — 99214 OFFICE O/P EST MOD 30 MIN: CPT

## 2025-07-03 LAB
CREAT ?TM UR-MCNC: 36 MG/DL — SIGNIFICANT CHANGE UP
PROT ?TM UR-MCNC: 8 MG/DL — SIGNIFICANT CHANGE UP (ref 0–12)
PROT/CREAT UR-RTO: 0.2 RATIO — SIGNIFICANT CHANGE UP (ref 0–0.2)

## 2025-07-23 ENCOUNTER — RESULT REVIEW (OUTPATIENT)
Age: 55
End: 2025-07-23

## 2025-07-23 ENCOUNTER — APPOINTMENT (OUTPATIENT)
Dept: HEMATOLOGY ONCOLOGY | Facility: CLINIC | Age: 55
End: 2025-07-23
Payer: COMMERCIAL

## 2025-07-23 ENCOUNTER — APPOINTMENT (OUTPATIENT)
Dept: INFUSION THERAPY | Facility: HOSPITAL | Age: 55
End: 2025-07-23

## 2025-07-23 VITALS
OXYGEN SATURATION: 99 % | WEIGHT: 145.92 LBS | TEMPERATURE: 97.5 F | DIASTOLIC BLOOD PRESSURE: 78 MMHG | HEART RATE: 71 BPM | BODY MASS INDEX: 27.58 KG/M2 | RESPIRATION RATE: 16 BRPM | SYSTOLIC BLOOD PRESSURE: 118 MMHG

## 2025-07-23 DIAGNOSIS — C53.9 MALIGNANT NEOPLASM OF CERVIX UTERI, UNSPECIFIED: ICD-10-CM

## 2025-07-23 LAB
ALBUMIN SERPL ELPH-MCNC: 4.6 G/DL — SIGNIFICANT CHANGE UP (ref 3.3–5)
ALP SERPL-CCNC: 73 U/L — SIGNIFICANT CHANGE UP (ref 40–120)
ALT FLD-CCNC: 15 U/L — SIGNIFICANT CHANGE UP (ref 10–45)
ANION GAP SERPL CALC-SCNC: 14 MMOL/L — SIGNIFICANT CHANGE UP (ref 5–17)
AST SERPL-CCNC: 28 U/L — SIGNIFICANT CHANGE UP (ref 10–40)
BASOPHILS # BLD AUTO: 0.03 K/UL — SIGNIFICANT CHANGE UP (ref 0–0.2)
BASOPHILS NFR BLD AUTO: 0.7 % — SIGNIFICANT CHANGE UP (ref 0–2)
BILIRUB SERPL-MCNC: 0.2 MG/DL — SIGNIFICANT CHANGE UP (ref 0.2–1.2)
BUN SERPL-MCNC: 15 MG/DL — SIGNIFICANT CHANGE UP (ref 7–23)
CALCIUM SERPL-MCNC: 10.3 MG/DL — SIGNIFICANT CHANGE UP (ref 8.4–10.5)
CHLORIDE SERPL-SCNC: 96 MMOL/L — SIGNIFICANT CHANGE UP (ref 96–108)
CO2 SERPL-SCNC: 24 MMOL/L — SIGNIFICANT CHANGE UP (ref 22–31)
CREAT SERPL-MCNC: 0.97 MG/DL — SIGNIFICANT CHANGE UP (ref 0.5–1.3)
EGFR: 69 ML/MIN/1.73M2 — SIGNIFICANT CHANGE UP
EGFR: 69 ML/MIN/1.73M2 — SIGNIFICANT CHANGE UP
EOSINOPHIL # BLD AUTO: 0.24 K/UL — SIGNIFICANT CHANGE UP (ref 0–0.5)
EOSINOPHIL NFR BLD AUTO: 5.8 % — SIGNIFICANT CHANGE UP (ref 0–6)
GLUCOSE SERPL-MCNC: 94 MG/DL — SIGNIFICANT CHANGE UP (ref 70–99)
HCT VFR BLD CALC: 37.6 % — SIGNIFICANT CHANGE UP (ref 34.5–45)
HGB BLD-MCNC: 12.6 G/DL — SIGNIFICANT CHANGE UP (ref 11.5–15.5)
IMM GRANULOCYTES NFR BLD AUTO: 0.2 % — SIGNIFICANT CHANGE UP (ref 0–0.9)
LYMPHOCYTES # BLD AUTO: 1.1 K/UL — SIGNIFICANT CHANGE UP (ref 1–3.3)
LYMPHOCYTES # BLD AUTO: 26.5 % — SIGNIFICANT CHANGE UP (ref 13–44)
MAGNESIUM SERPL-MCNC: 2.2 MG/DL — SIGNIFICANT CHANGE UP (ref 1.6–2.6)
MCHC RBC-ENTMCNC: 28.3 PG — SIGNIFICANT CHANGE UP (ref 27–34)
MCHC RBC-ENTMCNC: 33.5 G/DL — SIGNIFICANT CHANGE UP (ref 32–36)
MCV RBC AUTO: 84.3 FL — SIGNIFICANT CHANGE UP (ref 80–100)
MONOCYTES # BLD AUTO: 0.36 K/UL — SIGNIFICANT CHANGE UP (ref 0–0.9)
MONOCYTES NFR BLD AUTO: 8.7 % — SIGNIFICANT CHANGE UP (ref 2–14)
NEUTROPHILS # BLD AUTO: 2.41 K/UL — SIGNIFICANT CHANGE UP (ref 1.8–7.4)
NEUTROPHILS NFR BLD AUTO: 58.1 % — SIGNIFICANT CHANGE UP (ref 43–77)
NRBC BLD AUTO-RTO: 0 /100 WBCS — SIGNIFICANT CHANGE UP (ref 0–0)
PLATELET # BLD AUTO: 188 K/UL — SIGNIFICANT CHANGE UP (ref 150–400)
POTASSIUM SERPL-MCNC: 5 MMOL/L — SIGNIFICANT CHANGE UP (ref 3.5–5.3)
POTASSIUM SERPL-SCNC: 5 MMOL/L — SIGNIFICANT CHANGE UP (ref 3.5–5.3)
PROT SERPL-MCNC: 8.2 G/DL — SIGNIFICANT CHANGE UP (ref 6–8.3)
RBC # BLD: 4.46 M/UL — SIGNIFICANT CHANGE UP (ref 3.8–5.2)
RBC # FLD: 14.9 % — HIGH (ref 10.3–14.5)
SODIUM SERPL-SCNC: 133 MMOL/L — LOW (ref 135–145)
WBC # BLD: 4.15 K/UL — SIGNIFICANT CHANGE UP (ref 3.8–10.5)
WBC # FLD AUTO: 4.15 K/UL — SIGNIFICANT CHANGE UP (ref 3.8–10.5)

## 2025-07-23 PROCEDURE — G2211 COMPLEX E/M VISIT ADD ON: CPT

## 2025-07-23 PROCEDURE — 99213 OFFICE O/P EST LOW 20 MIN: CPT

## 2025-07-24 LAB
CREAT ?TM UR-MCNC: 55 MG/DL — SIGNIFICANT CHANGE UP
PROT ?TM UR-MCNC: 4 MG/DL — SIGNIFICANT CHANGE UP (ref 0–12)
PROT/CREAT UR-RTO: 0.1 RATIO — SIGNIFICANT CHANGE UP (ref 0–0.2)

## 2025-07-30 ENCOUNTER — LABORATORY RESULT (OUTPATIENT)
Age: 55
End: 2025-07-30

## 2025-08-02 ENCOUNTER — RESULT REVIEW (OUTPATIENT)
Age: 55
End: 2025-08-02

## 2025-08-06 ENCOUNTER — APPOINTMENT (OUTPATIENT)
Dept: CT IMAGING | Facility: CLINIC | Age: 55
End: 2025-08-06
Payer: COMMERCIAL

## 2025-08-06 ENCOUNTER — OUTPATIENT (OUTPATIENT)
Dept: OUTPATIENT SERVICES | Facility: HOSPITAL | Age: 55
LOS: 1 days | End: 2025-08-06
Payer: COMMERCIAL

## 2025-08-06 DIAGNOSIS — C53.9 MALIGNANT NEOPLASM OF CERVIX UTERI, UNSPECIFIED: ICD-10-CM

## 2025-08-06 PROCEDURE — 71260 CT THORAX DX C+: CPT | Mod: 26

## 2025-08-06 PROCEDURE — 71260 CT THORAX DX C+: CPT

## 2025-08-06 PROCEDURE — 74177 CT ABD & PELVIS W/CONTRAST: CPT

## 2025-08-06 PROCEDURE — 74177 CT ABD & PELVIS W/CONTRAST: CPT | Mod: 26

## 2025-08-12 ENCOUNTER — APPOINTMENT (OUTPATIENT)
Dept: HEMATOLOGY ONCOLOGY | Facility: CLINIC | Age: 55
End: 2025-08-12
Payer: COMMERCIAL

## 2025-08-12 VITALS
BODY MASS INDEX: 27.47 KG/M2 | SYSTOLIC BLOOD PRESSURE: 104 MMHG | HEIGHT: 61 IN | RESPIRATION RATE: 16 BRPM | HEART RATE: 76 BPM | TEMPERATURE: 97.7 F | DIASTOLIC BLOOD PRESSURE: 73 MMHG | WEIGHT: 145.5 LBS | OXYGEN SATURATION: 98 %

## 2025-08-12 DIAGNOSIS — C50.911 MALIGNANT NEOPLASM OF UNSPECIFIED SITE OF RIGHT FEMALE BREAST: ICD-10-CM

## 2025-08-12 DIAGNOSIS — C53.9 MALIGNANT NEOPLASM OF CERVIX UTERI, UNSPECIFIED: ICD-10-CM

## 2025-08-12 PROCEDURE — 99214 OFFICE O/P EST MOD 30 MIN: CPT

## 2025-08-13 ENCOUNTER — APPOINTMENT (OUTPATIENT)
Dept: INFUSION THERAPY | Facility: HOSPITAL | Age: 55
End: 2025-08-13

## 2025-08-13 ENCOUNTER — APPOINTMENT (OUTPATIENT)
Dept: HEMATOLOGY ONCOLOGY | Facility: CLINIC | Age: 55
End: 2025-08-13

## 2025-08-14 RX ORDER — DEXAMETHASONE SODIUM PHOSPHATE 1 MG/ML
0.1 SOLUTION/ DROPS OPHTHALMIC
Qty: 1 | Refills: 2 | Status: ACTIVE | COMMUNITY
Start: 2025-08-14 | End: 1900-01-01

## 2025-08-14 RX ORDER — PROCHLORPERAZINE MALEATE 10 MG/1
10 TABLET ORAL EVERY 6 HOURS
Qty: 20 | Refills: 3 | Status: ACTIVE | COMMUNITY
Start: 2025-08-14 | End: 1900-01-01

## 2025-08-14 RX ORDER — BRIMONIDINE TARTRATE 2 MG/MG
0.2 SOLUTION/ DROPS OPHTHALMIC
Qty: 5 | Refills: 1 | Status: ACTIVE | COMMUNITY
Start: 2025-08-14 | End: 1900-01-01

## 2025-08-14 RX ORDER — GLYCERIN .002; .002; .01 MG/MG; MG/MG; MG/MG
0.2-0.2-1 SOLUTION/ DROPS OPHTHALMIC
Qty: 1 | Refills: 5 | Status: ACTIVE | COMMUNITY
Start: 2025-08-14 | End: 1900-01-01

## 2025-08-20 ENCOUNTER — NON-APPOINTMENT (OUTPATIENT)
Age: 55
End: 2025-08-20

## 2025-08-20 ENCOUNTER — APPOINTMENT (OUTPATIENT)
Dept: OPHTHALMOLOGY | Facility: CLINIC | Age: 55
End: 2025-08-20

## 2025-08-20 PROCEDURE — 92004 COMPRE OPH EXAM NEW PT 1/>: CPT

## 2025-08-20 PROCEDURE — 92133 CPTRZD OPH DX IMG PST SGM ON: CPT

## 2025-08-29 ENCOUNTER — RESULT REVIEW (OUTPATIENT)
Age: 55
End: 2025-08-29

## 2025-08-29 ENCOUNTER — APPOINTMENT (OUTPATIENT)
Dept: HEMATOLOGY ONCOLOGY | Facility: CLINIC | Age: 55
End: 2025-08-29

## 2025-08-29 ENCOUNTER — NON-APPOINTMENT (OUTPATIENT)
Age: 55
End: 2025-08-29

## 2025-08-29 ENCOUNTER — APPOINTMENT (OUTPATIENT)
Dept: INFUSION THERAPY | Facility: HOSPITAL | Age: 55
End: 2025-08-29

## 2025-09-03 ENCOUNTER — APPOINTMENT (OUTPATIENT)
Dept: INFUSION THERAPY | Facility: HOSPITAL | Age: 55
End: 2025-09-03

## 2025-09-03 ENCOUNTER — APPOINTMENT (OUTPATIENT)
Dept: HEMATOLOGY ONCOLOGY | Facility: CLINIC | Age: 55
End: 2025-09-03

## 2025-09-04 ENCOUNTER — APPOINTMENT (OUTPATIENT)
Dept: SURGICAL ONCOLOGY | Facility: CLINIC | Age: 55
End: 2025-09-04

## 2025-09-04 DIAGNOSIS — R59.9 ENLARGED LYMPH NODES, UNSPECIFIED: ICD-10-CM

## 2025-09-10 ENCOUNTER — NON-APPOINTMENT (OUTPATIENT)
Age: 55
End: 2025-09-10

## 2025-09-10 ENCOUNTER — APPOINTMENT (OUTPATIENT)
Dept: HEMATOLOGY ONCOLOGY | Facility: CLINIC | Age: 55
End: 2025-09-10
Payer: COMMERCIAL

## 2025-09-10 ENCOUNTER — APPOINTMENT (OUTPATIENT)
Dept: OPHTHALMOLOGY | Facility: CLINIC | Age: 55
End: 2025-09-10
Payer: COMMERCIAL

## 2025-09-10 DIAGNOSIS — C53.9 MALIGNANT NEOPLASM OF CERVIX UTERI, UNSPECIFIED: ICD-10-CM

## 2025-09-10 LAB
ALBUMIN SERPL ELPH-MCNC: 4.4 G/DL
ALP BLD-CCNC: 75 U/L
ALT SERPL-CCNC: 40 U/L
ANION GAP SERPL CALC-SCNC: 14 MMOL/L
AST SERPL-CCNC: 41 U/L
BASOPHILS # BLD AUTO: 0.04 K/UL
BASOPHILS NFR BLD AUTO: 1.2 %
BILIRUB SERPL-MCNC: 0.3 MG/DL
BUN SERPL-MCNC: 10 MG/DL
CALCIUM SERPL-MCNC: 10 MG/DL
CHLORIDE SERPL-SCNC: 101 MMOL/L
CO2 SERPL-SCNC: 23 MMOL/L
CREAT SERPL-MCNC: 0.73 MG/DL
EGFRCR SERPLBLD CKD-EPI 2021: 97 ML/MIN/1.73M2
EOSINOPHIL # BLD AUTO: 0.18 K/UL
EOSINOPHIL NFR BLD AUTO: 5.2 %
GLUCOSE SERPL-MCNC: 103 MG/DL
HCT VFR BLD CALC: 39.4 %
HGB BLD-MCNC: 13 G/DL
IMM GRANULOCYTES NFR BLD AUTO: 1.2 %
LYMPHOCYTES # BLD AUTO: 0.97 K/UL
LYMPHOCYTES NFR BLD AUTO: 28 %
MAGNESIUM SERPL-MCNC: 2.3 MG/DL
MAN DIFF?: NORMAL
MCHC RBC-ENTMCNC: 28.5 PG
MCHC RBC-ENTMCNC: 33 G/DL
MCV RBC AUTO: 86.4 FL
MONOCYTES # BLD AUTO: 0.47 K/UL
MONOCYTES NFR BLD AUTO: 13.6 %
NEUTROPHILS # BLD AUTO: 1.76 K/UL
NEUTROPHILS NFR BLD AUTO: 50.8 %
PLATELET # BLD AUTO: 215 K/UL
POTASSIUM SERPL-SCNC: 4.3 MMOL/L
PROT SERPL-MCNC: 7.8 G/DL
RBC # BLD: 4.56 M/UL
RBC # FLD: 14.6 %
SODIUM SERPL-SCNC: 138 MMOL/L
WBC # FLD AUTO: 3.46 K/UL

## 2025-09-10 PROCEDURE — 99214 OFFICE O/P EST MOD 30 MIN: CPT | Mod: 95

## 2025-09-10 PROCEDURE — G2211 COMPLEX E/M VISIT ADD ON: CPT | Mod: 95

## 2025-09-10 PROCEDURE — 92012 INTRM OPH EXAM EST PATIENT: CPT
